# Patient Record
Sex: MALE | Race: WHITE | NOT HISPANIC OR LATINO | Employment: OTHER | URBAN - NONMETROPOLITAN AREA
[De-identification: names, ages, dates, MRNs, and addresses within clinical notes are randomized per-mention and may not be internally consistent; named-entity substitution may affect disease eponyms.]

---

## 2020-12-01 ENCOUNTER — TRANSCRIBE ORDERS (OUTPATIENT)
Dept: NUTRITION | Facility: HOSPITAL | Age: 55
End: 2020-12-01

## 2020-12-01 ENCOUNTER — TRANSCRIBE ORDERS (OUTPATIENT)
Dept: ADMINISTRATIVE | Facility: HOSPITAL | Age: 55
End: 2020-12-01

## 2020-12-01 DIAGNOSIS — K51.90 ULCERATIVE COLITIS, CHRONIC, WITHOUT COMPLICATIONS (HCC): Primary | ICD-10-CM

## 2020-12-11 ENCOUNTER — HOSPITAL ENCOUNTER (OUTPATIENT)
Dept: CT IMAGING | Facility: HOSPITAL | Age: 55
Discharge: HOME | End: 2020-12-11
Admitting: COLON & RECTAL SURGERY

## 2020-12-11 DIAGNOSIS — K51.90 ULCERATIVE COLITIS, CHRONIC, WITHOUT COMPLICATIONS (HCC): ICD-10-CM

## 2020-12-11 PROCEDURE — 25010000002 IOPAMIDOL 61 % SOLUTION: Performed by: COLON & RECTAL SURGERY

## 2020-12-11 PROCEDURE — 74177 CT ABD & PELVIS W/CONTRAST: CPT

## 2020-12-11 RX ADMIN — IOPAMIDOL 85 ML: 612 INJECTION, SOLUTION INTRAVENOUS at 10:49

## 2021-01-18 ENCOUNTER — APPOINTMENT (OUTPATIENT)
Dept: PREADMISSION TESTING | Facility: HOSPITAL | Age: 56
End: 2021-01-18

## 2021-01-18 ENCOUNTER — ANESTHESIA EVENT (OUTPATIENT)
Dept: PERIOP | Facility: HOSPITAL | Age: 56
End: 2021-01-18

## 2021-01-18 VITALS — WEIGHT: 165.57 LBS | BODY MASS INDEX: 24.52 KG/M2 | HEIGHT: 69 IN

## 2021-01-18 LAB
ABO GROUP BLD: NORMAL
ALBUMIN SERPL-MCNC: 4.3 G/DL (ref 3.5–5.2)
ALBUMIN/GLOB SERPL: 1.8 G/DL
ALP SERPL-CCNC: 74 U/L (ref 39–117)
ALT SERPL W P-5'-P-CCNC: 13 U/L (ref 1–41)
ANION GAP SERPL CALCULATED.3IONS-SCNC: 10 MMOL/L (ref 5–15)
AST SERPL-CCNC: 17 U/L (ref 1–40)
BILIRUB SERPL-MCNC: 0.6 MG/DL (ref 0–1.2)
BLD GP AB SCN SERPL QL: NEGATIVE
BUN SERPL-MCNC: 13 MG/DL (ref 6–20)
BUN/CREAT SERPL: 17.1 (ref 7–25)
CALCIUM SPEC-SCNC: 9.2 MG/DL (ref 8.6–10.5)
CHLORIDE SERPL-SCNC: 105 MMOL/L (ref 98–107)
CO2 SERPL-SCNC: 25 MMOL/L (ref 22–29)
CREAT SERPL-MCNC: 0.76 MG/DL (ref 0.76–1.27)
CRP SERPL-MCNC: 0.31 MG/DL (ref 0–0.5)
DEPRECATED RDW RBC AUTO: 42.8 FL (ref 37–54)
ERYTHROCYTE [DISTWIDTH] IN BLOOD BY AUTOMATED COUNT: 11.6 % (ref 12.3–15.4)
FLUAV RNA RESP QL NAA+PROBE: NOT DETECTED
FLUBV RNA RESP QL NAA+PROBE: NOT DETECTED
GFR SERPL CREATININE-BSD FRML MDRD: 106 ML/MIN/1.73
GLOBULIN UR ELPH-MCNC: 2.4 GM/DL
GLUCOSE SERPL-MCNC: 75 MG/DL (ref 65–99)
HBA1C MFR BLD: 5.3 % (ref 4.8–5.6)
HCT VFR BLD AUTO: 42.5 % (ref 37.5–51)
HGB BLD-MCNC: 14.1 G/DL (ref 13–17.7)
MCH RBC QN AUTO: 33.4 PG (ref 26.6–33)
MCHC RBC AUTO-ENTMCNC: 33.2 G/DL (ref 31.5–35.7)
MCV RBC AUTO: 100.7 FL (ref 79–97)
PLATELET # BLD AUTO: 245 10*3/MM3 (ref 140–450)
PMV BLD AUTO: 9.9 FL (ref 6–12)
POTASSIUM SERPL-SCNC: 4.1 MMOL/L (ref 3.5–5.2)
PROT SERPL-MCNC: 6.7 G/DL (ref 6–8.5)
QT INTERVAL: 424 MS
QTC INTERVAL: 430 MS
RBC # BLD AUTO: 4.22 10*6/MM3 (ref 4.14–5.8)
RH BLD: POSITIVE
SARS-COV-2 RNA RESP QL NAA+PROBE: NOT DETECTED
SODIUM SERPL-SCNC: 140 MMOL/L (ref 136–145)
T&S EXPIRATION DATE: NORMAL
WBC # BLD AUTO: 7.31 10*3/MM3 (ref 3.4–10.8)

## 2021-01-18 PROCEDURE — 93005 ELECTROCARDIOGRAM TRACING: CPT

## 2021-01-18 PROCEDURE — C9803 HOPD COVID-19 SPEC COLLECT: HCPCS

## 2021-01-18 PROCEDURE — 83036 HEMOGLOBIN GLYCOSYLATED A1C: CPT

## 2021-01-18 PROCEDURE — 93010 ELECTROCARDIOGRAM REPORT: CPT | Performed by: INTERNAL MEDICINE

## 2021-01-18 PROCEDURE — 86901 BLOOD TYPING SEROLOGIC RH(D): CPT

## 2021-01-18 PROCEDURE — 87636 SARSCOV2 & INF A&B AMP PRB: CPT

## 2021-01-18 PROCEDURE — 86900 BLOOD TYPING SEROLOGIC ABO: CPT

## 2021-01-18 PROCEDURE — 85027 COMPLETE CBC AUTOMATED: CPT

## 2021-01-18 PROCEDURE — 80053 COMPREHEN METABOLIC PANEL: CPT

## 2021-01-18 PROCEDURE — 86850 RBC ANTIBODY SCREEN: CPT

## 2021-01-18 PROCEDURE — 86140 C-REACTIVE PROTEIN: CPT

## 2021-01-18 PROCEDURE — 36415 COLL VENOUS BLD VENIPUNCTURE: CPT

## 2021-01-18 RX ORDER — BUDESONIDE 3 MG/1
9 CAPSULE, COATED PELLETS ORAL EVERY MORNING
COMMUNITY
End: 2021-12-14

## 2021-01-18 RX ORDER — ESCITALOPRAM OXALATE 10 MG/1
10 TABLET ORAL DAILY
COMMUNITY
End: 2021-12-14

## 2021-01-18 RX ORDER — FAMOTIDINE 10 MG/ML
20 INJECTION, SOLUTION INTRAVENOUS ONCE
Status: CANCELLED | OUTPATIENT
Start: 2021-01-18 | End: 2021-01-18

## 2021-01-18 RX ORDER — SIMETHICONE 125 MG
125 TABLET,CHEWABLE ORAL EVERY 6 HOURS PRN
COMMUNITY
End: 2021-12-14

## 2021-01-18 RX ORDER — TAMSULOSIN HYDROCHLORIDE 0.4 MG/1
1 CAPSULE ORAL DAILY
COMMUNITY

## 2021-01-18 RX ORDER — MELATONIN 10 MG
10 CAPSULE ORAL NIGHTLY PRN
COMMUNITY

## 2021-01-18 RX ORDER — MULTIPLE VITAMINS W/ MINERALS TAB 9MG-400MCG
1 TAB ORAL DAILY
COMMUNITY

## 2021-01-18 RX ORDER — SODIUM CHLORIDE 0.9 % (FLUSH) 0.9 %
10 SYRINGE (ML) INJECTION EVERY 12 HOURS SCHEDULED
Status: CANCELLED | OUTPATIENT
Start: 2021-01-18

## 2021-01-18 RX ORDER — SODIUM CHLORIDE 0.9 % (FLUSH) 0.9 %
10 SYRINGE (ML) INJECTION AS NEEDED
Status: CANCELLED | OUTPATIENT
Start: 2021-01-18

## 2021-01-18 RX ORDER — VEDOLIZUMAB 300 MG/5ML
300 INJECTION, POWDER, LYOPHILIZED, FOR SOLUTION INTRAVENOUS
COMMUNITY
End: 2021-01-23 | Stop reason: HOSPADM

## 2021-01-18 NOTE — PAT
Patient to apply Chlorhexadine wipes  to surgical area (as instructed) the night before procedure and the AM of procedure. Wipes provided.  Patient instructed to drink 20 ounces (or until full) of Gatorade and it needs to be completed 1 hour before given arrival time for procedure (NO RED Gatorade)    Patient verbalized understanding.  Blood bank bracelet applied to patient during Pre Admission Testing visit.  Patient instructed not to remove from arm until after procedure and they are discharged from the hospital.  Explained to patient that they may shower and get the bracelet wet, but not to immerse under water for longer periods (bathing, swimming, hand dishwashing, etc).  Patient verbalized understanding.  Juju Bullock [facility] nurse spoke with patient in PAT.  Temp from screening sticker 96.4.  GI Nurse navigator Delores Akins notified and message was left.

## 2021-01-19 ENCOUNTER — ANESTHESIA (OUTPATIENT)
Dept: PERIOP | Facility: HOSPITAL | Age: 56
End: 2021-01-19

## 2021-01-19 ENCOUNTER — HOSPITAL ENCOUNTER (INPATIENT)
Facility: HOSPITAL | Age: 56
LOS: 4 days | Discharge: HOME HEALTH | End: 2021-01-23
Attending: COLON & RECTAL SURGERY | Admitting: COLON & RECTAL SURGERY

## 2021-01-19 DIAGNOSIS — Z87.19 HISTORY OF ULCERATIVE COLITIS: ICD-10-CM

## 2021-01-19 DIAGNOSIS — D62 ACUTE BLOOD LOSS ANEMIA: ICD-10-CM

## 2021-01-19 DIAGNOSIS — K51.90: ICD-10-CM

## 2021-01-19 DIAGNOSIS — Z90.49 S/P PROCTOCOLECTOMY: Primary | ICD-10-CM

## 2021-01-19 DIAGNOSIS — E87.5 HYPERKALEMIA: ICD-10-CM

## 2021-01-19 LAB
ABO GROUP BLD: NORMAL
RH BLD: POSITIVE

## 2021-01-19 PROCEDURE — 86901 BLOOD TYPING SEROLOGIC RH(D): CPT

## 2021-01-19 PROCEDURE — 25010000002 HYDROMORPHONE PER 4 MG: Performed by: COLON & RECTAL SURGERY

## 2021-01-19 PROCEDURE — 25010000002 ONDANSETRON PER 1 MG: Performed by: NURSE ANESTHETIST, CERTIFIED REGISTERED

## 2021-01-19 PROCEDURE — 25010000002 HEPARIN (PORCINE) PER 1000 UNITS: Performed by: COLON & RECTAL SURGERY

## 2021-01-19 PROCEDURE — 25010000002 BUPRENORPHINE PER 0.1 MG: Performed by: NURSE ANESTHETIST, CERTIFIED REGISTERED

## 2021-01-19 PROCEDURE — 25010000002 NEOSTIGMINE 10 MG/10ML SOLUTION: Performed by: NURSE ANESTHETIST, CERTIFIED REGISTERED

## 2021-01-19 PROCEDURE — 25010000002 DEXAMETHASONE PER 1 MG: Performed by: NURSE ANESTHETIST, CERTIFIED REGISTERED

## 2021-01-19 PROCEDURE — 25010000002 KETOROLAC TROMETHAMINE PER 15 MG: Performed by: COLON & RECTAL SURGERY

## 2021-01-19 PROCEDURE — 25010000002 HYDROMORPHONE PER 4 MG: Performed by: NURSE ANESTHETIST, CERTIFIED REGISTERED

## 2021-01-19 PROCEDURE — 86900 BLOOD TYPING SEROLOGIC ABO: CPT

## 2021-01-19 PROCEDURE — 88305 TISSUE EXAM BY PATHOLOGIST: CPT | Performed by: COLON & RECTAL SURGERY

## 2021-01-19 PROCEDURE — 0D1B0Z4 BYPASS ILEUM TO CUTANEOUS, OPEN APPROACH: ICD-10-PCS | Performed by: COLON & RECTAL SURGERY

## 2021-01-19 PROCEDURE — 25010000002 DEXAMETHASONE SODIUM PHOSPHATE 10 MG/ML SOLUTION: Performed by: NURSE ANESTHETIST, CERTIFIED REGISTERED

## 2021-01-19 PROCEDURE — 25010000002 ERTAPENEM 1 GM/100ML SOLUTION: Performed by: COLON & RECTAL SURGERY

## 2021-01-19 PROCEDURE — 88309 TISSUE EXAM BY PATHOLOGIST: CPT | Performed by: COLON & RECTAL SURGERY

## 2021-01-19 PROCEDURE — 25010000002 PROPOFOL 10 MG/ML EMULSION: Performed by: NURSE ANESTHETIST, CERTIFIED REGISTERED

## 2021-01-19 PROCEDURE — 0DJD8ZZ INSPECTION OF LOWER INTESTINAL TRACT, VIA NATURAL OR ARTIFICIAL OPENING ENDOSCOPIC: ICD-10-PCS | Performed by: COLON & RECTAL SURGERY

## 2021-01-19 PROCEDURE — C1769 GUIDE WIRE: HCPCS | Performed by: COLON & RECTAL SURGERY

## 2021-01-19 PROCEDURE — C1765 ADHESION BARRIER: HCPCS | Performed by: COLON & RECTAL SURGERY

## 2021-01-19 PROCEDURE — 0DTP0ZZ RESECTION OF RECTUM, OPEN APPROACH: ICD-10-PCS | Performed by: COLON & RECTAL SURGERY

## 2021-01-19 PROCEDURE — 07TP0ZZ RESECTION OF SPLEEN, OPEN APPROACH: ICD-10-PCS | Performed by: COLON & RECTAL SURGERY

## 2021-01-19 PROCEDURE — 0DBE0ZZ EXCISION OF LARGE INTESTINE, OPEN APPROACH: ICD-10-PCS | Performed by: COLON & RECTAL SURGERY

## 2021-01-19 PROCEDURE — 25810000003 SODIUM CHLORIDE 0.9 % WITH KCL 20 MEQ 20-0.9 MEQ/L-% SOLUTION: Performed by: COLON & RECTAL SURGERY

## 2021-01-19 PROCEDURE — 25010000002 FENTANYL CITRATE (PF) 100 MCG/2ML SOLUTION: Performed by: NURSE ANESTHETIST, CERTIFIED REGISTERED

## 2021-01-19 DEVICE — CONTOUR CURVED CUTTER STAPLER
Type: IMPLANTABLE DEVICE | Site: ABDOMEN | Status: FUNCTIONAL
Brand: CONTOUR

## 2021-01-19 DEVICE — PROXIMATE LINEAR CUTTER RELOAD, BLUE, 75MM
Type: IMPLANTABLE DEVICE | Site: ABDOMEN | Status: FUNCTIONAL
Brand: PROXIMATE

## 2021-01-19 DEVICE — CELLULAR STAPLER WITH TRI-STAPLE TECHNOLOGY
Type: IMPLANTABLE DEVICE | Site: ABDOMEN | Status: FUNCTIONAL
Brand: EEA

## 2021-01-19 DEVICE — PROXIMATE RELOADABLE LINEAR CUTTER WITH SAFETY LOCK-OUT, 75MM
Type: IMPLANTABLE DEVICE | Site: ABDOMEN | Status: FUNCTIONAL
Brand: PROXIMATE

## 2021-01-19 RX ORDER — FENTANYL CITRATE 50 UG/ML
50 INJECTION, SOLUTION INTRAMUSCULAR; INTRAVENOUS
Status: COMPLETED | OUTPATIENT
Start: 2021-01-19 | End: 2021-01-19

## 2021-01-19 RX ORDER — SODIUM CHLORIDE, SODIUM LACTATE, POTASSIUM CHLORIDE, CALCIUM CHLORIDE 600; 310; 30; 20 MG/100ML; MG/100ML; MG/100ML; MG/100ML
9 INJECTION, SOLUTION INTRAVENOUS CONTINUOUS
Status: DISCONTINUED | OUTPATIENT
Start: 2021-01-19 | End: 2021-01-23 | Stop reason: HOSPADM

## 2021-01-19 RX ORDER — ROCURONIUM BROMIDE 10 MG/ML
INJECTION, SOLUTION INTRAVENOUS AS NEEDED
Status: DISCONTINUED | OUTPATIENT
Start: 2021-01-19 | End: 2021-01-19 | Stop reason: SURG

## 2021-01-19 RX ORDER — BUPIVACAINE HYDROCHLORIDE 2.5 MG/ML
INJECTION, SOLUTION EPIDURAL; INFILTRATION; INTRACAUDAL
Status: COMPLETED | OUTPATIENT
Start: 2021-01-19 | End: 2021-01-19

## 2021-01-19 RX ORDER — FAMOTIDINE 10 MG/ML
20 INJECTION, SOLUTION INTRAVENOUS 2 TIMES DAILY
Status: DISCONTINUED | OUTPATIENT
Start: 2021-01-19 | End: 2021-01-21 | Stop reason: CLARIF

## 2021-01-19 RX ORDER — CHOLECALCIFEROL (VITAMIN D3) 125 MCG
5 CAPSULE ORAL NIGHTLY
Status: DISCONTINUED | OUTPATIENT
Start: 2021-01-19 | End: 2021-01-23 | Stop reason: HOSPADM

## 2021-01-19 RX ORDER — FERRIC SUBSULFATE 0.21 G/G
LIQUID TOPICAL AS NEEDED
Status: DISCONTINUED | OUTPATIENT
Start: 2021-01-19 | End: 2021-01-23 | Stop reason: HOSPADM

## 2021-01-19 RX ORDER — LIDOCAINE HYDROCHLORIDE 10 MG/ML
INJECTION, SOLUTION EPIDURAL; INFILTRATION; INTRACAUDAL; PERINEURAL AS NEEDED
Status: DISCONTINUED | OUTPATIENT
Start: 2021-01-19 | End: 2021-01-19 | Stop reason: SURG

## 2021-01-19 RX ORDER — EPHEDRINE SULFATE 50 MG/ML
5 INJECTION, SOLUTION INTRAVENOUS ONCE AS NEEDED
Status: DISCONTINUED | OUTPATIENT
Start: 2021-01-19 | End: 2021-01-19 | Stop reason: HOSPADM

## 2021-01-19 RX ORDER — MELOXICAM 15 MG/1
15 TABLET ORAL ONCE
Status: COMPLETED | OUTPATIENT
Start: 2021-01-19 | End: 2021-01-19

## 2021-01-19 RX ORDER — ONDANSETRON 2 MG/ML
INJECTION INTRAMUSCULAR; INTRAVENOUS AS NEEDED
Status: DISCONTINUED | OUTPATIENT
Start: 2021-01-19 | End: 2021-01-19 | Stop reason: SURG

## 2021-01-19 RX ORDER — DEXAMETHASONE SODIUM PHOSPHATE 4 MG/ML
INJECTION, SOLUTION INTRA-ARTICULAR; INTRALESIONAL; INTRAMUSCULAR; INTRAVENOUS; SOFT TISSUE AS NEEDED
Status: DISCONTINUED | OUTPATIENT
Start: 2021-01-19 | End: 2021-01-19 | Stop reason: SURG

## 2021-01-19 RX ORDER — ONDANSETRON 4 MG/1
4 TABLET, FILM COATED ORAL EVERY 6 HOURS PRN
Status: DISCONTINUED | OUTPATIENT
Start: 2021-01-19 | End: 2021-01-23 | Stop reason: HOSPADM

## 2021-01-19 RX ORDER — HYDROMORPHONE HYDROCHLORIDE 1 MG/ML
0.5 INJECTION, SOLUTION INTRAMUSCULAR; INTRAVENOUS; SUBCUTANEOUS
Status: DISCONTINUED | OUTPATIENT
Start: 2021-01-19 | End: 2021-01-23 | Stop reason: HOSPADM

## 2021-01-19 RX ORDER — POLYVINYL ALCOHOL 14 MG/ML
2 SOLUTION/ DROPS OPHTHALMIC
Status: DISCONTINUED | OUTPATIENT
Start: 2021-01-19 | End: 2021-01-23 | Stop reason: HOSPADM

## 2021-01-19 RX ORDER — DEXAMETHASONE SODIUM PHOSPHATE 1 MG/ML
1 SOLUTION/ DROPS OPHTHALMIC EVERY 6 HOURS SCHEDULED
Status: DISCONTINUED | OUTPATIENT
Start: 2021-01-19 | End: 2021-01-23 | Stop reason: HOSPADM

## 2021-01-19 RX ORDER — PREGABALIN 75 MG/1
75 CAPSULE ORAL ONCE
Status: COMPLETED | OUTPATIENT
Start: 2021-01-19 | End: 2021-01-19

## 2021-01-19 RX ORDER — KETOROLAC TROMETHAMINE 15 MG/ML
15 INJECTION, SOLUTION INTRAMUSCULAR; INTRAVENOUS EVERY 6 HOURS
Status: COMPLETED | OUTPATIENT
Start: 2021-01-19 | End: 2021-01-21

## 2021-01-19 RX ORDER — TAMSULOSIN HYDROCHLORIDE 0.4 MG/1
0.4 CAPSULE ORAL 2 TIMES DAILY
Status: DISCONTINUED | OUTPATIENT
Start: 2021-01-19 | End: 2021-01-23 | Stop reason: HOSPADM

## 2021-01-19 RX ORDER — DEXAMETHASONE SODIUM PHOSPHATE 10 MG/ML
INJECTION, SOLUTION INTRAMUSCULAR; INTRAVENOUS
Status: COMPLETED | OUTPATIENT
Start: 2021-01-19 | End: 2021-01-19

## 2021-01-19 RX ORDER — ONDANSETRON 2 MG/ML
4 INJECTION INTRAMUSCULAR; INTRAVENOUS EVERY 6 HOURS PRN
Status: DISCONTINUED | OUTPATIENT
Start: 2021-01-19 | End: 2021-01-19 | Stop reason: SDUPTHER

## 2021-01-19 RX ORDER — GABAPENTIN 300 MG/1
300 CAPSULE ORAL 3 TIMES DAILY
Status: COMPLETED | OUTPATIENT
Start: 2021-01-19 | End: 2021-01-22

## 2021-01-19 RX ORDER — FAMOTIDINE 20 MG/1
20 TABLET, FILM COATED ORAL ONCE
Status: COMPLETED | OUTPATIENT
Start: 2021-01-19 | End: 2021-01-19

## 2021-01-19 RX ORDER — ACETAMINOPHEN 500 MG
1000 TABLET ORAL ONCE
Status: COMPLETED | OUTPATIENT
Start: 2021-01-19 | End: 2021-01-19

## 2021-01-19 RX ORDER — DIAZEPAM 5 MG/1
5 TABLET ORAL EVERY 6 HOURS PRN
Status: DISCONTINUED | OUTPATIENT
Start: 2021-01-19 | End: 2021-01-23 | Stop reason: HOSPADM

## 2021-01-19 RX ORDER — ALVIMOPAN 12 MG/1
12 CAPSULE ORAL ONCE
Status: COMPLETED | OUTPATIENT
Start: 2021-01-19 | End: 2021-01-19

## 2021-01-19 RX ORDER — ONDANSETRON 2 MG/ML
4 INJECTION INTRAMUSCULAR; INTRAVENOUS ONCE AS NEEDED
Status: DISCONTINUED | OUTPATIENT
Start: 2021-01-19 | End: 2021-01-19 | Stop reason: HOSPADM

## 2021-01-19 RX ORDER — ACETAMINOPHEN 325 MG/1
650 TABLET ORAL EVERY 8 HOURS
Status: DISCONTINUED | OUTPATIENT
Start: 2021-01-19 | End: 2021-01-23 | Stop reason: HOSPADM

## 2021-01-19 RX ORDER — LIDOCAINE HYDROCHLORIDE 10 MG/ML
0.5 INJECTION, SOLUTION EPIDURAL; INFILTRATION; INTRACAUDAL; PERINEURAL ONCE AS NEEDED
Status: COMPLETED | OUTPATIENT
Start: 2021-01-19 | End: 2021-01-19

## 2021-01-19 RX ORDER — PROPOFOL 10 MG/ML
VIAL (ML) INTRAVENOUS AS NEEDED
Status: DISCONTINUED | OUTPATIENT
Start: 2021-01-19 | End: 2021-01-19 | Stop reason: SURG

## 2021-01-19 RX ORDER — HYDROCODONE BITARTRATE AND ACETAMINOPHEN 7.5; 325 MG/1; MG/1
1 TABLET ORAL EVERY 4 HOURS PRN
Status: DISCONTINUED | OUTPATIENT
Start: 2021-01-19 | End: 2021-01-23 | Stop reason: HOSPADM

## 2021-01-19 RX ORDER — FENTANYL CITRATE 50 UG/ML
INJECTION, SOLUTION INTRAMUSCULAR; INTRAVENOUS AS NEEDED
Status: DISCONTINUED | OUTPATIENT
Start: 2021-01-19 | End: 2021-01-19 | Stop reason: SURG

## 2021-01-19 RX ORDER — ONDANSETRON 2 MG/ML
4 INJECTION INTRAMUSCULAR; INTRAVENOUS EVERY 6 HOURS PRN
Status: DISCONTINUED | OUTPATIENT
Start: 2021-01-19 | End: 2021-01-23 | Stop reason: HOSPADM

## 2021-01-19 RX ORDER — SODIUM CHLORIDE AND POTASSIUM CHLORIDE 150; 900 MG/100ML; MG/100ML
100 INJECTION, SOLUTION INTRAVENOUS CONTINUOUS
Status: DISCONTINUED | OUTPATIENT
Start: 2021-01-19 | End: 2021-01-20 | Stop reason: ALTCHOICE

## 2021-01-19 RX ORDER — HEPARIN SODIUM 5000 [USP'U]/ML
5000 INJECTION, SOLUTION INTRAVENOUS; SUBCUTANEOUS ONCE
Status: COMPLETED | OUTPATIENT
Start: 2021-01-19 | End: 2021-01-19

## 2021-01-19 RX ORDER — HEPARIN SODIUM 5000 [USP'U]/ML
5000 INJECTION, SOLUTION INTRAVENOUS; SUBCUTANEOUS EVERY 8 HOURS SCHEDULED
Status: DISCONTINUED | OUTPATIENT
Start: 2021-01-20 | End: 2021-01-20

## 2021-01-19 RX ORDER — NALOXONE HCL 0.4 MG/ML
0.4 VIAL (ML) INJECTION AS NEEDED
Status: DISCONTINUED | OUTPATIENT
Start: 2021-01-19 | End: 2021-01-19 | Stop reason: HOSPADM

## 2021-01-19 RX ORDER — HYDROMORPHONE HYDROCHLORIDE 1 MG/ML
0.5 INJECTION, SOLUTION INTRAMUSCULAR; INTRAVENOUS; SUBCUTANEOUS
Status: DISCONTINUED | OUTPATIENT
Start: 2021-01-19 | End: 2021-01-19 | Stop reason: HOSPADM

## 2021-01-19 RX ORDER — NEOSTIGMINE METHYLSULFATE 1 MG/ML
INJECTION, SOLUTION INTRAVENOUS AS NEEDED
Status: DISCONTINUED | OUTPATIENT
Start: 2021-01-19 | End: 2021-01-19 | Stop reason: SURG

## 2021-01-19 RX ORDER — ESCITALOPRAM OXALATE 10 MG/1
10 TABLET ORAL DAILY
Status: DISCONTINUED | OUTPATIENT
Start: 2021-01-19 | End: 2021-01-23 | Stop reason: HOSPADM

## 2021-01-19 RX ORDER — GLYCOPYRROLATE 0.2 MG/ML
INJECTION INTRAMUSCULAR; INTRAVENOUS AS NEEDED
Status: DISCONTINUED | OUTPATIENT
Start: 2021-01-19 | End: 2021-01-19 | Stop reason: SURG

## 2021-01-19 RX ORDER — NALOXONE HCL 0.4 MG/ML
0.4 VIAL (ML) INJECTION
Status: DISCONTINUED | OUTPATIENT
Start: 2021-01-19 | End: 2021-01-23 | Stop reason: HOSPADM

## 2021-01-19 RX ORDER — BUPRENORPHINE HYDROCHLORIDE 0.32 MG/ML
INJECTION INTRAMUSCULAR; INTRAVENOUS
Status: COMPLETED | OUTPATIENT
Start: 2021-01-19 | End: 2021-01-19

## 2021-01-19 RX ORDER — LABETALOL HYDROCHLORIDE 5 MG/ML
10 INJECTION, SOLUTION INTRAVENOUS EVERY 4 HOURS PRN
Status: ACTIVE | OUTPATIENT
Start: 2021-01-19 | End: 2021-01-22

## 2021-01-19 RX ORDER — SCOLOPAMINE TRANSDERMAL SYSTEM 1 MG/1
1 PATCH, EXTENDED RELEASE TRANSDERMAL CONTINUOUS
Status: ACTIVE | OUTPATIENT
Start: 2021-01-19 | End: 2021-01-22

## 2021-01-19 RX ORDER — SODIUM CHLORIDE, SODIUM LACTATE, POTASSIUM CHLORIDE, CALCIUM CHLORIDE 600; 310; 30; 20 MG/100ML; MG/100ML; MG/100ML; MG/100ML
100 INJECTION, SOLUTION INTRAVENOUS CONTINUOUS
Status: DISCONTINUED | OUTPATIENT
Start: 2021-01-19 | End: 2021-01-23 | Stop reason: HOSPADM

## 2021-01-19 RX ORDER — MAGNESIUM HYDROXIDE 1200 MG/15ML
LIQUID ORAL AS NEEDED
Status: DISCONTINUED | OUTPATIENT
Start: 2021-01-19 | End: 2021-01-19 | Stop reason: HOSPADM

## 2021-01-19 RX ORDER — EPHEDRINE SULFATE 50 MG/ML
INJECTION, SOLUTION INTRAVENOUS AS NEEDED
Status: DISCONTINUED | OUTPATIENT
Start: 2021-01-19 | End: 2021-01-19 | Stop reason: SURG

## 2021-01-19 RX ADMIN — MELOXICAM 15 MG: 15 TABLET ORAL at 09:04

## 2021-01-19 RX ADMIN — LIDOCAINE HYDROCHLORIDE 50 MG: 10 INJECTION, SOLUTION EPIDURAL; INFILTRATION; INTRACAUDAL; PERINEURAL at 11:50

## 2021-01-19 RX ADMIN — HEPARIN SODIUM 5000 UNITS: 5000 INJECTION, SOLUTION INTRAVENOUS; SUBCUTANEOUS at 09:05

## 2021-01-19 RX ADMIN — HYDROMORPHONE HYDROCHLORIDE 0.5 MG: 1 INJECTION, SOLUTION INTRAMUSCULAR; INTRAVENOUS; SUBCUTANEOUS at 15:24

## 2021-01-19 RX ADMIN — LIDOCAINE HYDROCHLORIDE 0.5 ML: 10 INJECTION, SOLUTION EPIDURAL; INFILTRATION; INTRACAUDAL; PERINEURAL at 08:55

## 2021-01-19 RX ADMIN — TAMSULOSIN HYDROCHLORIDE 0.4 MG: 0.4 CAPSULE ORAL at 20:23

## 2021-01-19 RX ADMIN — ALVIMOPAN 12 MG: 12 CAPSULE ORAL at 09:04

## 2021-01-19 RX ADMIN — ROCURONIUM BROMIDE 20 MG: 10 INJECTION INTRAVENOUS at 12:51

## 2021-01-19 RX ADMIN — SCOPALAMINE 1 PATCH: 1 PATCH, EXTENDED RELEASE TRANSDERMAL at 09:04

## 2021-01-19 RX ADMIN — FENTANYL CITRATE 50 MCG: 50 INJECTION, SOLUTION INTRAMUSCULAR; INTRAVENOUS at 15:15

## 2021-01-19 RX ADMIN — PROPOFOL 25 MCG/KG/MIN: 10 INJECTION, EMULSION INTRAVENOUS at 11:56

## 2021-01-19 RX ADMIN — BUPIVACAINE HYDROCHLORIDE 60 ML: 2.5 INJECTION, SOLUTION EPIDURAL; INFILTRATION; INTRACAUDAL; PERINEURAL at 11:53

## 2021-01-19 RX ADMIN — POTASSIUM CHLORIDE AND SODIUM CHLORIDE 100 ML/HR: 900; 150 INJECTION, SOLUTION INTRAVENOUS at 18:10

## 2021-01-19 RX ADMIN — FENTANYL CITRATE 50 MCG: 50 INJECTION, SOLUTION INTRAMUSCULAR; INTRAVENOUS at 11:50

## 2021-01-19 RX ADMIN — SODIUM CHLORIDE, POTASSIUM CHLORIDE, SODIUM LACTATE AND CALCIUM CHLORIDE 9 ML/HR: 600; 310; 30; 20 INJECTION, SOLUTION INTRAVENOUS at 08:55

## 2021-01-19 RX ADMIN — HYDROMORPHONE HYDROCHLORIDE 0.5 MG: 1 INJECTION, SOLUTION INTRAMUSCULAR; INTRAVENOUS; SUBCUTANEOUS at 15:29

## 2021-01-19 RX ADMIN — GABAPENTIN 300 MG: 300 CAPSULE ORAL at 20:22

## 2021-01-19 RX ADMIN — ROCURONIUM BROMIDE 50 MG: 10 INJECTION INTRAVENOUS at 11:50

## 2021-01-19 RX ADMIN — ERTAPENEM SODIUM 1 G: 1 INJECTION, POWDER, LYOPHILIZED, FOR SOLUTION INTRAMUSCULAR; INTRAVENOUS at 11:55

## 2021-01-19 RX ADMIN — DEXAMETHASONE SODIUM PHOSPHATE 1 DROP: 1 SOLUTION/ DROPS OPHTHALMIC at 20:23

## 2021-01-19 RX ADMIN — FENTANYL CITRATE 50 MCG: 50 INJECTION, SOLUTION INTRAMUSCULAR; INTRAVENOUS at 14:07

## 2021-01-19 RX ADMIN — DEXAMETHASONE SODIUM PHOSPHATE 6 MG: 4 INJECTION, SOLUTION INTRA-ARTICULAR; INTRALESIONAL; INTRAMUSCULAR; INTRAVENOUS; SOFT TISSUE at 11:51

## 2021-01-19 RX ADMIN — DEXAMETHASONE SODIUM PHOSPHATE 4 MG: 10 INJECTION, SOLUTION INTRAMUSCULAR; INTRAVENOUS at 11:53

## 2021-01-19 RX ADMIN — GLYCOPYRROLATE 0.2 MG: 0.4 INJECTION INTRAMUSCULAR; INTRAVENOUS at 12:25

## 2021-01-19 RX ADMIN — BUPRENORPHINE HYDROCHLORIDE 0.3 MG: 0.32 INJECTION INTRAMUSCULAR; INTRAVENOUS at 11:53

## 2021-01-19 RX ADMIN — PREGABALIN 75 MG: 75 CAPSULE ORAL at 09:04

## 2021-01-19 RX ADMIN — FAMOTIDINE 20 MG: 10 INJECTION, SOLUTION INTRAVENOUS at 20:22

## 2021-01-19 RX ADMIN — Medication 5 MG: at 20:22

## 2021-01-19 RX ADMIN — SODIUM CHLORIDE, POTASSIUM CHLORIDE, SODIUM LACTATE AND CALCIUM CHLORIDE: 600; 310; 30; 20 INJECTION, SOLUTION INTRAVENOUS at 14:00

## 2021-01-19 RX ADMIN — FENTANYL CITRATE 50 MCG: 50 INJECTION, SOLUTION INTRAMUSCULAR; INTRAVENOUS at 15:05

## 2021-01-19 RX ADMIN — HYDROMORPHONE HYDROCHLORIDE 0.5 MG: 1 INJECTION, SOLUTION INTRAMUSCULAR; INTRAVENOUS; SUBCUTANEOUS at 20:26

## 2021-01-19 RX ADMIN — ONDANSETRON 4 MG: 2 INJECTION INTRAMUSCULAR; INTRAVENOUS at 14:05

## 2021-01-19 RX ADMIN — EPHEDRINE SULFATE 10 MG: 50 INJECTION, SOLUTION INTRAVENOUS at 12:31

## 2021-01-19 RX ADMIN — FAMOTIDINE 20 MG: 20 TABLET, FILM COATED ORAL at 09:04

## 2021-01-19 RX ADMIN — KETOROLAC TROMETHAMINE 15 MG: 15 INJECTION, SOLUTION INTRAMUSCULAR; INTRAVENOUS at 18:10

## 2021-01-19 RX ADMIN — FENTANYL CITRATE 50 MCG: 50 INJECTION, SOLUTION INTRAMUSCULAR; INTRAVENOUS at 16:37

## 2021-01-19 RX ADMIN — FENTANYL CITRATE 50 MCG: 50 INJECTION, SOLUTION INTRAMUSCULAR; INTRAVENOUS at 16:27

## 2021-01-19 RX ADMIN — ACETAMINOPHEN 1000 MG: 500 TABLET ORAL at 09:04

## 2021-01-19 RX ADMIN — PROPOFOL 150 MG: 10 INJECTION, EMULSION INTRAVENOUS at 11:50

## 2021-01-19 RX ADMIN — ACETAMINOPHEN 650 MG: 325 TABLET, FILM COATED ORAL at 18:10

## 2021-01-19 RX ADMIN — NEOSTIGMINE 3 MG: 1 INJECTION INTRAVENOUS at 14:23

## 2021-01-19 RX ADMIN — ESCITALOPRAM OXALATE 10 MG: 10 TABLET ORAL at 20:22

## 2021-01-19 RX ADMIN — GLYCOPYRROLATE 0.4 MG: 0.4 INJECTION INTRAMUSCULAR; INTRAVENOUS at 14:23

## 2021-01-19 NOTE — ANESTHESIA PROCEDURE NOTES
Peripheral Block      Patient reassessed immediately prior to procedure    Patient location during procedure: OR  Reason for block: at surgeon's request and post-op pain management  Performed by  CRNA: Arnaldo Garrett CRNA  Assisted by: Rishabh Galo CRNA  Preanesthetic Checklist  Completed: patient identified, site marked, surgical consent, pre-op evaluation, timeout performed, IV checked, risks and benefits discussed and monitors and equipment checked  Prep:  Pt Position: supine  Sterile barriers:cap, gloves, sterile barriers and mask  Prep: ChloraPrep  Patient monitoring: blood pressure monitoring, continuous pulse oximetry and EKG  Procedure  Sedation:yes  Performed under: general  Guidance:ultrasound guided  Images:still images obtained, printed/placed on chart    Laterality:Bilateral (4 quad)  Block Type:TAP  Injection Technique:single-shot  Needle Type:short-bevel and echogenic  Needle Gauge:20 G  Resistance on Injection: none    Medications Used: buprenorphine (BUPRENEX) injection, 0.3 mg  dexamethasone sodium phosphate injection, 4 mg  bupivacaine PF (MARCAINE) 0.25 % injection, 60 mL  Med admintered at 1/19/2021 11:53 AM      Medications  Preservative Free Saline:20ml  Comment:Block Injection:  LA dose divided between Right and Left block        Post Assessment  Injection Assessment: negative aspiration for heme, incremental injection and no paresthesia on injection  Patient Tolerance:comfortable throughout block  Complications:no  Additional Notes      Under Ultrasound guidance, a BBraun 4inch 360 degree needle was advanced with Normal Saline hydro dissection of tissue.  The Internal Oblique and Transversus Abdominus muscles where visualized.  At or before the aponeurosis of Internal Oblique, local anesthetic spread was visualized in the Transversus Abdominus Plane. Injection was made incrementally with aspiration every 5 mls.  There was no  intravascular injection,  injection pressure was normal, there  was no neural injection, and the procedure was completed without difficulty.  Thank You.

## 2021-01-19 NOTE — ANESTHESIA PREPROCEDURE EVALUATION
Anesthesia Evaluation     Patient summary reviewed and Nursing notes reviewed                Airway   Mallampati: II  TM distance: >3 FB  Neck ROM: full  No difficulty expected  Dental - normal exam   (+) partials and poor dentition    Pulmonary - normal exam   (+) a smoker Former,   Cardiovascular - negative cardio ROS and normal exam        Neuro/Psych- negative ROS  GI/Hepatic/Renal/Endo - negative ROS     ROS Comment: Ulcerative colitis    Musculoskeletal (-) negative ROS    Abdominal  - normal exam    Bowel sounds: normal.   Substance History - negative use     OB/GYN negative ob/gyn ROS         Other                      Anesthesia Plan    ASA 2     general with block   (TAP blocks)  intravenous induction     Anesthetic plan, all risks, benefits, and alternatives have been provided, discussed and informed consent has been obtained with: patient.    Plan discussed with CRNA.

## 2021-01-19 NOTE — OP NOTE
[facility] ([facility])    Proctocolectomy, total mesorectal excision  Ileal pouch, 18 cm  Pouch anal anastomosis at 1 cm above the dentate line/pelvic floor.  Protecting loop ileostomy 20 cm proximal to the ileal pouch.  Resection of accessory spleen    Procedure Note    Craig Smalls  1/19/2021    Pre-op Diagnosis: History of ulcerative colitis  History of dysplasia  History of biologic therapy/Entyvio  IBD/diagnosis 7 years ago  Preop evaluation including CT demonstrating thickening of the colon.    Post-op Diagnosis: Same, no intraoperative findings to suggest Crohn's disease, healthy appearing small bowel, healthy appearing soft tissues around the anal canal.    Anesthesia: General with Block    Staff:   Circulator: Ermelinda Gordon RN; Lily Callahan RN; Germaine Cabrera RN  Scrub Person: Loki Gutierrez; Dar Malone  Assistant: Peggy Pool RNFA    Assistant: Peggy Pool RNFA was responsible for performing the following activities: Exposure, retraction, irrigation, approximation of tissues and their skilled assistance was necessary for the success of this case.    Estimated Blood Loss: 200 mL    Specimens: Abdominal colon and rectum, anastomotic tissue, accessory spleen        Drains: Pelvic JANNET drain    Findings: As above    Complications: None evident    The procedure was reviewed in the pretreatment area.  We discussed the surgical options once again with plan for proctocolectomy and pouch if this appears reasonable, permanent ileostomy if not.    With antibiotic and DVT prophylaxis, the patient advanced to the operating room where general anesthesia was achieved, block was performed by anesthesia.    Position was modified lithotomy.  The anal canal and the perineum/abdominal regions were prepped and draped.    Timeout protocol was utilized.    Exploration through a lower abdominal midline incision was without evidence of pathology in the small bowel which  was run from the ligament of Treitz to the distal ileum.    The hepatobiliary structures appeared healthy.    The stomach was nicely decompressed with orogastric tube.    The colon appeared diffusely inflamed congruent with diagnosis of chronic ulcerative colitis.  There is no palpable mass throughout.    The sigmoid was mobilized and the ileocolic region was mobilized with bilateral identification of ureters.    The left colon was mobilized away from the kidney.    In reverse Trendelenburg the splenic flexure was completely mobilized with care to avoid the duodenum.  The dissection proceeded inferior to the spleen.  There is accessory spleen which was resected and passed off as a specimen.    The mobilization proceeded beyond the pancreas.    The hepatic flexure was completely mobilized to the duodenum posterior lateral to the plane of dissection.    The distal ileum was divided with JEFF.  The mesentery was sequentially divided preserving the ileocolic artery and suture ligating the right colic artery.    As the mesentery was divided moving distally, the middle colic artery was suture-ligated.    The left colic artery was suture-ligated, the dissection proceeded down to the sigmoid region where there was ligation of the inferior mesenteric artery, not high.    With care to preserve the hypogastric nerves and surrounding soft tissues the dissection continued in the presacral plane, mesorectal excision was performed as per routine dissecting posteriorly, posterior laterally, laterally, anterior laterally, and finally anteriorly dissecting the anterior peritoneal reflection, dissecting the plane between the prostate and rectum, down to the pelvic floor circumferentially.    The contour device was used to divide the rectum at the pelvic floor, just proximal to the puborectalis.    There is good hemostasis on inspection.    A interval lap count was satisfactory at this point in the case.    There was good reach of the  ileum down to the deep pelvis after mobilization of the mesentery up to the SMA/duodenum origin region.    The apex of the pouch was selected anticipating a pouch length of about 18 cm.  This was incised in the antimesenteric corner, sequential applications of the 75 blue thickness JEFF were used to create the pouch reservoir.    Irrigation of the pouch demonstrated good volume content, no gross blood, pouch appeared very satisfactory.    The excess tissue at the apex of the pouch was excised, the anvil, 28 EEA was secured at the top of the pouch with a handsewn pursestring.    The EEA was gently introduced into the anal canal, the post was open, the anvil was connected.  This was closed and anastomosis was complete.    Proctoscopy demonstrated anastomosis at the top of the internal hemorrhoids just above the puborectalis which was airtight, hemostatic, and widely patent.    Irrigation aspiration was performed throughout.  Each quadrant was inspected for hemostasis which was satisfactory.    25 cm upstream of the pouch the small bowel location was selected for protecting loop ileostomy.  Umbilical tape was placed around the bowel and this was oriented with dyed and undyed Vicryl and brought out across the rectus at the preoperatively identified stoma site which had been marked by the stomal therapist.    10 flat JANNET was used in the deep pelvis.    2 sheets of Seprafilm were used around the loop ileostomy.    The residual omentum was draped of the small bowel.    The midline fascia was reapproximated into retention of oh Vicryl No. 1 loop PDS    The incision was irrigated with dilute antiseptic solution    The skin was reapproximated skin clips.    The final counts were announced as correct.    The stoma was matured with flap maturation of the distal inferior aspect and Deepali eversion of the proximal superior aspect.    The final counts were announced as correct.      Silverio Montero MD     Date: 1/19/2021  Time:  14:35 EST

## 2021-01-19 NOTE — ANESTHESIA PROCEDURE NOTES
Airway  Urgency: elective    Date/Time: 1/19/2021 11:51 AM  Airway not difficult    General Information and Staff    Patient location during procedure: OR  CRNA: Rishabh Galo CRNA    Indications and Patient Condition  Indications for airway management: airway protection    Preoxygenated: yes  MILS not maintained throughout  Mask difficulty assessment: 1 - vent by mask    Final Airway Details  Final airway type: endotracheal airway      Successful airway: ETT  Cuffed: yes   Successful intubation technique: direct laryngoscopy  Endotracheal tube insertion site: oral  Blade: Maldonado  Blade size: 2  ETT size (mm): 7.5  Cormack-Lehane Classification: grade I - full view of glottis  Placement verified by: chest auscultation and capnometry   Cuff volume (mL): 5  Measured from: lips  ETT/EBT  to lips (cm): 22  Number of attempts at approach: 1  Assessment: lips, teeth, and gum same as pre-op and atraumatic intubation    Additional Comments  Negative epigastric sounds, Breath sound equal bilaterally with symmetric chest rise and fall

## 2021-01-19 NOTE — NURSING NOTE
wocn performed stoma site marking in rlq. Patient assessed lying sitting and standing. Rectus muscle identified and line of vision.     Patient lots of questions. Most answered. reassurance given on follow up while inpatient for resources and education.     wocn will continue to follow. Please contact with any questions or concerns.

## 2021-01-19 NOTE — ANESTHESIA POSTPROCEDURE EVALUATION
Patient: Craig Smalls    Procedure Summary     Date: 01/19/21 Room / Location:  [facility] /  [facility]    Anesthesia Start: 1145 Anesthesia Stop:     Procedure: COLECTOMY AND ILEOSTOMY, POSSIBLE PROCTOCOLECTOMY POUCH, LOOP  (N/A Abdomen) Diagnosis:     Surgeon: Silverio Montero MD Provider: Ashely Henley MD    Anesthesia Type: general with block ASA Status: 2          Anesthesia Type: general with block    Vitals  No vitals data found for the desired time range.          Post Anesthesia Care and Evaluation    Patient location during evaluation: PACU  Patient participation: complete - patient participated  Level of consciousness: awake and alert  Pain score: 0  Pain management: adequate  Airway patency: patent  Anesthetic complications: No anesthetic complications  PONV Status: none  Cardiovascular status: hemodynamically stable and acceptable  Respiratory status: nonlabored ventilation, acceptable and nasal cannula  Hydration status: acceptable

## 2021-01-19 NOTE — H&P
Pre-Op H&P  Craig Smalls  [id number]  1965      Chief complaint: Ulcerative colitis      Subjective:  Patient is a 55 y.o.male presents for scheduled surgery by Dr. Montero. He anticipates a COLECTOMY AND ILEOSTOMY, POSSIBLE PROCTOCOLECTOMY POUCH, LOOP today. He has hx of ulcerative colitis. Last colonoscopy 11/10/20. Pt reports he was found to have dysplagia. He has chronic diarrhea.       Review of Systems:  Constitutional-- No fever, chills or sweats. No fatigue.  CV-- No chest pain, palpitation or syncope  Resp-- No SOB, cough, hemoptysis  Skin--No rashes or lesions      Allergies: No Known Allergies      Home Meds:  Medications Prior to Admission   Medication Sig Dispense Refill Last Dose   • Artificial Tear Ointment (DRY EYES OP) Apply  to eye(s) as directed by provider Daily As Needed (dry eyes).   1/18/2021 at 1330   • escitalopram (LEXAPRO) 10 MG tablet Take 10 mg by mouth Daily.   1/18/2021 at 0830   • Melatonin 10 MG capsule Take 10 mg by mouth Every Night.   1/18/2021 at 2300   • multivitamin with minerals (CENTRUM SILVER PO) Take 1 tablet by mouth Daily.   1/18/2021 at 0830   • prednisoLONE Sodium Phosphate (PREDNISOL OP) Apply 1 drop to eye(s) as directed by provider 4 (Four) Times a Day.   1/19/2021 at 0630   • tamsulosin (FLOMAX) 0.4 MG capsule 24 hr capsule Take 1 capsule by mouth 2 (two) times a day.   1/18/2021 at 0830   • Budesonide (ENTOCORT EC) 3 MG 24 hr capsule Take 9 mg by mouth Every Morning.   1/16/2021   • simethicone (MYLICON) 125 MG chewable tablet Chew 125 mg Every 6 (Six) Hours As Needed for Flatulence.   1/17/2021   • vedolizumab (Entyvio) 300 MG injection Infuse 300 mg into a venous catheter Every 28 (Twenty-Eight) Days.   12/21/2020         PMH:   Past Medical History:   Diagnosis Date   • Enlarged prostate    • Ulcerative colitis, chronic (CMS/HCC)    • Wears glasses     Reading    • Wears hearing aid     non-compliant at times   • Wears partial dentures     UPPER  "PARTIAL     PSH:    Past Surgical History:   Procedure Laterality Date   • COLONOSCOPY         Immunization History:  Influenza:   Pneumococcal: Unknown  Tetanus: UTD      Social History:   Tobacco:   Social History     Tobacco Use   Smoking Status Former Smoker   • Types: Cigarettes   • Quit date: 1997   • Years since quittin.0   Smokeless Tobacco Never Used   Tobacco Comment    \"I smoked a little when I drank\"       Alcohol:     Social History     Substance and Sexual Activity   Alcohol Use Not Currently         Physical Exam:/87 (BP Location: Right arm, Patient Position: Lying)   Pulse 64   Temp 97.9 °F (36.6 °C) (Temporal)   Resp 16   Ht 175.3 cm (69\")   Wt 74.8 kg (165 lb)   SpO2 100%   BMI 24.37 kg/m²       General Appearance:    Alert, cooperative, no distress, appears stated age   Head:    Normocephalic, without obvious abnormality, atraumatic   Lungs:     Clear to auscultation bilaterally, respirations unlabored    Heart:   Regular rate and rhythm, S1 and S2 normal    Abdomen:    Soft without tenderness   Breast Exam:    deferred   Genitalia:    deferred   Extremities:   Extremities normal, atraumatic, no cyanosis or edema   Skin:   Skin color, texture, turgor normal, no rashes or lesions   Neurologic:   Grossly intact     Results Review:     LABS:  Lab Results   Component Value Date    WBC 7.31 2021    HGB 14.1 2021    HCT 42.5 2021    .7 (H) 2021     2021    GLUCOSE 75 2021    BUN 13 2021    CREATININE 0.76 2021    EGFRIFNONA 106 2021    EGFRIFAFRI >60 2018     2021    K 4.1 2021     2021    CO2 25.0 2021    CALCIUM 9.2 2021    ALBUMIN 4.30 2021    AST 17 2021    ALT 13 2021    BILITOT 0.6 2021       RADIOLOGY:  CT abd 20:  Study Result    EXAMINATION: CT ABDOMEN PELVIS W CONTRAST- 2020      INDICATION: K51.90-Ulcerative " colitis, unspecified, without  complications; abdominal pain     TECHNIQUE: Multiple axial CT imaging was obtained of the abdomen and  pelvis following the administration of oral and intravenous contrast.     The radiation dose reduction device was turned on for each scan per the  ALARA (As Low as Reasonably Achievable) protocol.     COMPARISON: NONE     FINDINGS: ABDOMEN: The lung bases are grossly clear with some chronic  changes identified lung bases bilaterally. The liver is homogeneous in  appearance. The spleen is unremarkable. No stones in the gallbladder.  The kidneys and adrenal glands within normal limits. The pancreas is  homogeneous. The abdominal portion of the gastrointestinal tract within  normal limits. No free fluid or free air. No abnormal mass or fluid  collections identified.     PELVIS: There is some mild wall thickening and stranding seen  surrounding the descending colon. There is some mild wall thickening  identified of the splenic flexure. The remainder of the colon is  unremarkable. Findings likely representing patient's known colitis.  There is no pelvic adenopathy. No significant stranding seen surrounding  the sigmoid colon. There is no abnormal mass or fluid collection.  Degenerative changes seen within the spine and pelvis.     Delayed imaging reveals contrast seen in the renal collecting systems  bilaterally as well as within the ureters and bladder with no evidence  of obstruction.     IMPRESSION:  Abnormal wall thickening seen in the splenic flexure and  descending colon likely suggesting patient's known colitis. There is no  significant stranding seen surrounding the sigmoid colon or rectum. The  remainder of the abdomen and pelvis is grossly unremarkable with some  minimal chronic changes seen at the lung bases.       I reviewed the patient's new clinical results.    Cancer Staging (if applicable)  Cancer Patient: __ yes __no __unknown; If yes, clinical stage T:__ N:__M:__, stage  group or __N/A      Impression: Ulcerative colitis       Plan: COLECTOMY AND ILEOSTOMY, POSSIBLE PROCTOCOLECTOMY POUCH, LOOP      Jessica Patino, JINA   1/19/2021   09:16 EST

## 2021-01-19 NOTE — H&P
Admission HP     Patient Name: Craig Smalls  MRN: [id number]  : 1965  DOS: 2021    Attending: Silverio Montero MD    Primary Care Provider: Albert Delgado MD      Patient Care Team:  Albert Delgado MD as PCP - General (Family Medicine)    Chief complaint: Ulcerative colitis    Subjective   Patient is a pleasant 55 y.o. male presented for scheduled surgery by Dr. Silverio Montero  Patient with history of ulcerative colitis for about 7 years.  He has been on biologic therapy Entyvio.  Preop evaluation with CT scan showed thickening of the colon.  Today he underwent extensive procedures listed below under general anesthesia, tolerated surgery well, was admitted for further management.    When seen in his room after surgery, he is doing well, expected postoperative pain.  Already ambulated.  No nausea or vomiting.  No shortness of breath.    Allergies:  No Known Allergies    Meds:  Medications Prior to Admission   Medication Sig Dispense Refill Last Dose   • Artificial Tear Ointment (DRY EYES OP) Apply  to eye(s) as directed by provider Daily As Needed (dry eyes).   2021 at 1330   • escitalopram (LEXAPRO) 10 MG tablet Take 10 mg by mouth Daily.   2021 at 0830   • Melatonin 10 MG capsule Take 10 mg by mouth Every Night.   2021 at 2300   • multivitamin with minerals (CENTRUM SILVER PO) Take 1 tablet by mouth Daily.   2021 at 0830   • prednisoLONE Sodium Phosphate (PREDNISOL OP) Apply 1 drop to eye(s) as directed by provider 4 (Four) Times a Day.   2021 at 0630   • tamsulosin (FLOMAX) 0.4 MG capsule 24 hr capsule Take 1 capsule by mouth 2 (two) times a day.   2021 at 0830   • Budesonide (ENTOCORT EC) 3 MG 24 hr capsule Take 9 mg by mouth Every Morning.   2021   • simethicone (MYLICON) 125 MG chewable tablet Chew 125 mg Every 6 (Six) Hours As Needed for Flatulence.   2021   • vedolizumab (Entyvio) 300 MG injection Infuse 300 mg into a venous catheter Every 28  "(Twenty-Eight) Days.   2020         History:   Past Medical History:   Diagnosis Date   • Enlarged prostate    • Ulcerative colitis, chronic (CMS/HCC)    • Wears glasses     Reading    • Wears hearing aid     non-compliant at times   • Wears partial dentures     UPPER PARTIAL   History of uveitis    Past Surgical History:   Procedure Laterality Date   • COLONOSCOPY       History reviewed. No pertinent family history.  Social History     Tobacco Use   • Smoking status: Former Smoker     Types: Cigarettes     Quit date: 1997     Years since quittin.0   • Smokeless tobacco: Never Used   • Tobacco comment: \"I smoked a little when I drank\"    Substance Use Topics   • Alcohol use: Not Currently   • Drug use: Never   , retired.(Was a teacher/principal/truancy officer)    Review of Systems  Pertinent items are noted in HPI    Vital Signs  /89   Pulse 65   Temp 97.2 °F (36.2 °C) (Temporal)   Resp 16   Ht 175.3 cm (69\")   Wt 74.8 kg (165 lb)   SpO2 100%   BMI 24.37 kg/m²     Physical Exam:    General Appearance:    Alert, cooperative, in no acute distress   Head:    Normocephalic, without obvious abnormality, atraumatic   Eyes:            Lids and lashes normal, conjunctivae and sclerae normal, no   icterus, no pallor, corneas clear   Ears:    Ears appear intact with no abnormalities noted   Throat:   No oral lesions, no thrush, oral mucosa moist   Neck:   No adenopathy, supple, trachea midline, no thyromegaly         Lungs:     Clear to auscultation,respirations regular, even and       unlabored. No wheezes or rales.    Heart:    Regular rhythm and normal rate, normal S1 and S2, no murmur    Abdomen:      Soft, expected tenderness.  Serous drainage on midline incision dressing.  Left JANNET drain with serosanguineous drainage in bulb.  Stoma on the right is pink with a bridge.   Genitalia:    Deferred  Harrison with dark urine.   Extremities:   Moves all extremities well, no edema, no " cyanosis, no              redness   Pulses:   Pulses palpable and equal bilaterally   Skin:   No bleeding, bruising or rash   Neurologic:   Cranial nerves 2 - 12 grossly intact, no gross motor deficit.     Results from last 7 days   Lab Units 01/18/21  1325   WBC 10*3/mm3 7.31   HEMOGLOBIN g/dL 14.1   HEMATOCRIT % 42.5   PLATELETS 10*3/mm3 245     Results from last 7 days   Lab Units 01/18/21  1325   SODIUM mmol/L 140   POTASSIUM mmol/L 4.1   CHLORIDE mmol/L 105   CO2 mmol/L 25.0   BUN mg/dL 13   CREATININE mg/dL 0.76   CALCIUM mg/dL 9.2   BILIRUBIN mg/dL 0.6   ALK PHOS U/L 74   ALT (SGPT) U/L 13   AST (SGOT) U/L 17   GLUCOSE mg/dL 75     Lab Results   Component Value Date    HGBA1C 5.30 01/18/2021       Assessment and Plan:       S/P proctocolectomy( total mesorectal excision, ileal pouch, pouch anal anastomosis, protecting loop ileostomy, resection of accessory spleen)       Chronic ulcerative colitis (CMS/HCC)      Plan  1. Ambulation, several times daily.  2. Pain control-prns   3. IS-encourage  4. DVT proph- Mechanical, subcutaneous heparin  5. Bowel regimen  6. Resume home medications as appropriate  7. Monitor post-op labs  8. DC planning   9. Diet, Clears, advance diet as tolerated.  Watch for bowel function/stoma output.  IVF initially, monitor volume status.    -New stoma:  Wound care stoma nurse consult for stoma care and education throughout patient's hospitalization.    Discussed with patient postop management plan, expressed understanding and agreement.      Dragon disclaimer:  Part of this encounter note is an electronic transcription/translation of spoken language to printed text. The electronic translation of spoken language may permit erroneous, or at times, nonsensical words or phrases to be inadvertently transcribed; Although I have reviewed the note for such errors, some may still exist.    Zane Gonzales MD  01/19/21  18:20 EST

## 2021-01-20 PROBLEM — D72.829 LEUKOCYTOSIS: Status: ACTIVE | Noted: 2021-01-20

## 2021-01-20 PROBLEM — E87.5 HYPERKALEMIA: Status: ACTIVE | Noted: 2021-01-20

## 2021-01-20 PROBLEM — D62 ACUTE BLOOD LOSS ANEMIA: Status: ACTIVE | Noted: 2021-01-20

## 2021-01-20 LAB
ANION GAP SERPL CALCULATED.3IONS-SCNC: 7 MMOL/L (ref 5–15)
BASOPHILS # BLD AUTO: 0.01 10*3/MM3 (ref 0–0.2)
BASOPHILS NFR BLD AUTO: 0.1 % (ref 0–1.5)
BUN SERPL-MCNC: 11 MG/DL (ref 6–20)
BUN/CREAT SERPL: 13.8 (ref 7–25)
CALCIUM SPEC-SCNC: 8.5 MG/DL (ref 8.6–10.5)
CHLORIDE SERPL-SCNC: 103 MMOL/L (ref 98–107)
CO2 SERPL-SCNC: 24 MMOL/L (ref 22–29)
CREAT SERPL-MCNC: 0.8 MG/DL (ref 0.76–1.27)
DEPRECATED RDW RBC AUTO: 42.7 FL (ref 37–54)
EOSINOPHIL # BLD AUTO: 0.29 10*3/MM3 (ref 0–0.4)
EOSINOPHIL NFR BLD AUTO: 2 % (ref 0.3–6.2)
ERYTHROCYTE [DISTWIDTH] IN BLOOD BY AUTOMATED COUNT: 11.4 % (ref 12.3–15.4)
GFR SERPL CREATININE-BSD FRML MDRD: 100 ML/MIN/1.73
GLUCOSE SERPL-MCNC: 178 MG/DL (ref 65–99)
HCT VFR BLD AUTO: 34.5 % (ref 37.5–51)
HGB BLD-MCNC: 11.2 G/DL (ref 13–17.7)
IMM GRANULOCYTES # BLD AUTO: 0.03 10*3/MM3 (ref 0–0.05)
IMM GRANULOCYTES NFR BLD AUTO: 0.2 % (ref 0–0.5)
LYMPHOCYTES # BLD AUTO: 0.68 10*3/MM3 (ref 0.7–3.1)
LYMPHOCYTES NFR BLD AUTO: 4.8 % (ref 19.6–45.3)
MCH RBC QN AUTO: 32.9 PG (ref 26.6–33)
MCHC RBC AUTO-ENTMCNC: 32.5 G/DL (ref 31.5–35.7)
MCV RBC AUTO: 101.5 FL (ref 79–97)
MONOCYTES # BLD AUTO: 1.41 10*3/MM3 (ref 0.1–0.9)
MONOCYTES NFR BLD AUTO: 10 % (ref 5–12)
NEUTROPHILS NFR BLD AUTO: 11.73 10*3/MM3 (ref 1.7–7)
NEUTROPHILS NFR BLD AUTO: 82.9 % (ref 42.7–76)
NRBC BLD AUTO-RTO: 0 /100 WBC (ref 0–0.2)
PLATELET # BLD AUTO: 249 10*3/MM3 (ref 140–450)
PMV BLD AUTO: 9.9 FL (ref 6–12)
POTASSIUM SERPL-SCNC: 5.8 MMOL/L (ref 3.5–5.2)
RBC # BLD AUTO: 3.4 10*6/MM3 (ref 4.14–5.8)
SODIUM SERPL-SCNC: 134 MMOL/L (ref 136–145)
WBC # BLD AUTO: 14.15 10*3/MM3 (ref 3.4–10.8)

## 2021-01-20 PROCEDURE — 85025 COMPLETE CBC W/AUTO DIFF WBC: CPT | Performed by: COLON & RECTAL SURGERY

## 2021-01-20 PROCEDURE — 25010000002 HYDROMORPHONE PER 4 MG: Performed by: COLON & RECTAL SURGERY

## 2021-01-20 PROCEDURE — 25810000003 SODIUM CHLORIDE 0.9 % WITH KCL 20 MEQ 20-0.9 MEQ/L-% SOLUTION: Performed by: COLON & RECTAL SURGERY

## 2021-01-20 PROCEDURE — 80048 BASIC METABOLIC PNL TOTAL CA: CPT | Performed by: COLON & RECTAL SURGERY

## 2021-01-20 PROCEDURE — 25010000002 HEPARIN (PORCINE) PER 1000 UNITS: Performed by: INTERNAL MEDICINE

## 2021-01-20 PROCEDURE — 25010000002 KETOROLAC TROMETHAMINE PER 15 MG: Performed by: COLON & RECTAL SURGERY

## 2021-01-20 PROCEDURE — 63710000001 ONDANSETRON PER 8 MG: Performed by: COLON & RECTAL SURGERY

## 2021-01-20 PROCEDURE — 97161 PT EVAL LOW COMPLEX 20 MIN: CPT | Performed by: PHYSICAL THERAPIST

## 2021-01-20 RX ORDER — HEPARIN SODIUM 5000 [USP'U]/ML
5000 INJECTION, SOLUTION INTRAVENOUS; SUBCUTANEOUS EVERY 8 HOURS SCHEDULED
Status: DISCONTINUED | OUTPATIENT
Start: 2021-01-20 | End: 2021-01-23 | Stop reason: HOSPADM

## 2021-01-20 RX ORDER — SODIUM CHLORIDE 9 MG/ML
100 INJECTION, SOLUTION INTRAVENOUS CONTINUOUS
Status: DISCONTINUED | OUTPATIENT
Start: 2021-01-20 | End: 2021-01-23 | Stop reason: HOSPADM

## 2021-01-20 RX ADMIN — FAMOTIDINE 20 MG: 10 INJECTION, SOLUTION INTRAVENOUS at 10:05

## 2021-01-20 RX ADMIN — Medication 5 MG: at 21:16

## 2021-01-20 RX ADMIN — ACETAMINOPHEN 650 MG: 325 TABLET, FILM COATED ORAL at 18:08

## 2021-01-20 RX ADMIN — HYDROMORPHONE HYDROCHLORIDE 0.5 MG: 1 INJECTION, SOLUTION INTRAMUSCULAR; INTRAVENOUS; SUBCUTANEOUS at 03:11

## 2021-01-20 RX ADMIN — KETOROLAC TROMETHAMINE 15 MG: 15 INJECTION, SOLUTION INTRAMUSCULAR; INTRAVENOUS at 12:27

## 2021-01-20 RX ADMIN — TAMSULOSIN HYDROCHLORIDE 0.4 MG: 0.4 CAPSULE ORAL at 10:05

## 2021-01-20 RX ADMIN — HYDROCODONE BITARTRATE AND ACETAMINOPHEN 1 TABLET: 7.5; 325 TABLET ORAL at 06:05

## 2021-01-20 RX ADMIN — ACETAMINOPHEN 650 MG: 325 TABLET, FILM COATED ORAL at 10:05

## 2021-01-20 RX ADMIN — DEXAMETHASONE SODIUM PHOSPHATE 1 DROP: 1 SOLUTION/ DROPS OPHTHALMIC at 18:08

## 2021-01-20 RX ADMIN — POTASSIUM CHLORIDE AND SODIUM CHLORIDE 100 ML/HR: 900; 150 INJECTION, SOLUTION INTRAVENOUS at 03:14

## 2021-01-20 RX ADMIN — HYDROCODONE BITARTRATE AND ACETAMINOPHEN 1 TABLET: 7.5; 325 TABLET ORAL at 10:51

## 2021-01-20 RX ADMIN — GABAPENTIN 300 MG: 300 CAPSULE ORAL at 21:16

## 2021-01-20 RX ADMIN — KETOROLAC TROMETHAMINE 15 MG: 15 INJECTION, SOLUTION INTRAMUSCULAR; INTRAVENOUS at 00:32

## 2021-01-20 RX ADMIN — DEXAMETHASONE SODIUM PHOSPHATE 1 DROP: 1 SOLUTION/ DROPS OPHTHALMIC at 12:27

## 2021-01-20 RX ADMIN — GABAPENTIN 300 MG: 300 CAPSULE ORAL at 10:05

## 2021-01-20 RX ADMIN — SODIUM CHLORIDE 100 ML/HR: 9 INJECTION, SOLUTION INTRAVENOUS at 21:19

## 2021-01-20 RX ADMIN — KETOROLAC TROMETHAMINE 15 MG: 15 INJECTION, SOLUTION INTRAMUSCULAR; INTRAVENOUS at 06:02

## 2021-01-20 RX ADMIN — SODIUM CHLORIDE 100 ML/HR: 9 INJECTION, SOLUTION INTRAVENOUS at 10:52

## 2021-01-20 RX ADMIN — ESCITALOPRAM OXALATE 10 MG: 10 TABLET ORAL at 10:05

## 2021-01-20 RX ADMIN — TAMSULOSIN HYDROCHLORIDE 0.4 MG: 0.4 CAPSULE ORAL at 21:16

## 2021-01-20 RX ADMIN — GABAPENTIN 300 MG: 300 CAPSULE ORAL at 18:08

## 2021-01-20 RX ADMIN — HYDROCODONE BITARTRATE AND ACETAMINOPHEN 1 TABLET: 7.5; 325 TABLET ORAL at 18:11

## 2021-01-20 RX ADMIN — HYDROCODONE BITARTRATE AND ACETAMINOPHEN 1 TABLET: 7.5; 325 TABLET ORAL at 00:36

## 2021-01-20 RX ADMIN — FAMOTIDINE 20 MG: 10 INJECTION, SOLUTION INTRAVENOUS at 21:16

## 2021-01-20 RX ADMIN — ONDANSETRON HYDROCHLORIDE 4 MG: 4 TABLET, FILM COATED ORAL at 03:50

## 2021-01-20 RX ADMIN — SODIUM CHLORIDE 500 ML: 9 INJECTION, SOLUTION INTRAVENOUS at 12:00

## 2021-01-20 RX ADMIN — ACETAMINOPHEN 650 MG: 325 TABLET, FILM COATED ORAL at 03:07

## 2021-01-20 RX ADMIN — HEPARIN SODIUM 5000 UNITS: 5000 INJECTION INTRAVENOUS; SUBCUTANEOUS at 21:17

## 2021-01-20 RX ADMIN — DEXAMETHASONE SODIUM PHOSPHATE 1 DROP: 1 SOLUTION/ DROPS OPHTHALMIC at 00:32

## 2021-01-20 RX ADMIN — SODIUM CHLORIDE 500 ML: 9 INJECTION, SOLUTION INTRAVENOUS at 13:00

## 2021-01-20 RX ADMIN — KETOROLAC TROMETHAMINE 15 MG: 15 INJECTION, SOLUTION INTRAMUSCULAR; INTRAVENOUS at 18:08

## 2021-01-20 NOTE — PROGRESS NOTES
Discharge Planning Assessment  [facility]     Patient Name: Craig Smalls  MRN: [id number]  Today's Date: 1/20/2021    Admit Date: 1/19/2021    Discharge Needs Assessment     Row Name 01/20/21 0951       Living Environment    Lives With  spouse    Name(s) of Who Lives With Patient  wife Jerri [phone]    Current Living Arrangements  home/apartment/condo    Primary Care Provided by  self    Provides Primary Care For  no one    Family Caregiver if Needed  spouse    Family Caregiver Names  wife Jerri [phone]    Quality of Family Relationships  helpful;involved;supportive    Able to Return to Prior Arrangements  yes       Transition Planning    Patient/Family Anticipates Transition to  home with family    Transportation Anticipated  family or friend will provide       Discharge Needs Assessment    Readmission Within the Last 30 Days  no previous admission in last 30 days    Equipment Currently Used at Home  none        Discharge Plan     Row Name 01/20/21 0952       Plan    Plan  home with wife who will transport    Plan Comments  SW met with patient and wife Jerri [phone] at bedside to discuss discharge planning. Lives in [facility]. Reports functioning independently with ADL’s, No DME or HH. Discussed HH options if needed. Plan is home with wife who will transport.        Continued Care and Services - Admitted Since 1/19/2021    Coordination has not been started for this encounter.         Demographic Summary     Row Name 01/20/21 0951       General Information    Admission Type  inpatient    Arrived From  emergency department    Referral Source  admission list    Reason for Consult  discharge planning        Functional Status     Row Name 01/20/21 0951       Functional Status    Usual Activity Tolerance  good    Current Activity Tolerance  good       Functional Status, IADL    Medications  independent    Meal Preparation  independent    Housekeeping  independent    Laundry   independent    Shopping  independent    IADL Comments  No DME       Employment/    Employment/ Comments  Patient has [employer] medical insurance and can afford medications.        Psychosocial    No documentation.       Abuse/Neglect    No documentation.       Legal    No documentation.       Substance Abuse    No documentation.       Patient Forms    No documentation.           VIKTOR Breen (Kay)

## 2021-01-20 NOTE — PROGRESS NOTES
"IM progress note      Craig Smalls  [id number]  1965     LOS: 1 day     Attending: Silverio Montero MD    Primary Care Provider: Albert Delgado MD      Chief Complaint/Reason for visit: Ulcerative colitis    Subjective   Doing fairly well.  Moderate abdominal pain.  Ambulating halls.  Tolerating clear diet.  Serosanguineous ostomy output. Denies f/c/n/v/sob/cp.    Objective     Vital Signs    Visit Vitals  /68   Pulse 79   Temp 98.3 °F (36.8 °C) (Oral)   Resp 18   Ht 175.3 cm (69\")   Wt 74.8 kg (165 lb)   SpO2 97%   BMI 24.37 kg/m²     Temp (24hrs), Av.8 °F (36.6 °C), Min:97 °F (36.1 °C), Max:99.2 °F (37.3 °C)      Nutrition: Clear liquids    Respiratory: Room air    Physical Exam:     General Appearance:    Alert, cooperative, in no acute distress   Head:    Normocephalic, without obvious abnormality, atraumatic    Lungs:     Normal effort, symmetric chest rise, no crepitus, clear to      auscultation bilaterally             Heart:    Regular rhythm and normal rate, normal S1 and S2   Abdomen:     Soft, expected tenderness.  Serous drainage on midline incision dressing.  Left JANNET drain with serosanguineous drainage in bulb.  Stoma on the right is pink with a bridge.  : Harrison-yellow UOP   Extremities:   No clubbing, cyanosis or edema.  No deformities.    Pulses:   Pulses palpable and equal bilaterally   Skin:   No bleeding, bruising or rash   Neurologic:   Moves all extremities with no obvious focal motor deficit.  Cranial nerves 2 - 12 grossly intact     Results Review:     I reviewed the patient's new clinical results.   Results from last 7 days   Lab Units 21  0416 21  1325   WBC 10*3/mm3 14.15* 7.31   HEMOGLOBIN g/dL 11.2* 14.1   HEMATOCRIT % 34.5* 42.5   PLATELETS 10*3/mm3 249 245     Results from last 7 days   Lab Units 21  0416 21  1325   SODIUM mmol/L 134* 140   POTASSIUM mmol/L 5.8* 4.1   CHLORIDE mmol/L 103 105   CO2 mmol/L 24.0 25.0   BUN mg/dL 11 13 "   CREATININE mg/dL 0.80 0.76   CALCIUM mg/dL 8.5* 9.2   BILIRUBIN mg/dL  --  0.6   ALK PHOS U/L  --  74   ALT (SGPT) U/L  --  13   AST (SGOT) U/L  --  17   GLUCOSE mg/dL 178* 75     I reviewed the patient's new imaging including images and reports.    All medications reviewed.   acetaminophen, 650 mg, Oral, Q8H  dexamethasone, 1 drop, Both Eyes, Q6H  escitalopram, 10 mg, Oral, Daily  famotidine, 20 mg, Intravenous, BID  gabapentin, 300 mg, Oral, TID  heparin (porcine), 5,000 Units, Subcutaneous, Q8H  ketorolac, 15 mg, Intravenous, Q6H  melatonin, 5 mg, Oral, Nightly  tamsulosin, 0.4 mg, Oral, BID        Assessment/Plan     S/P proctocolectomy( total mesorectal excision, ileal pouch, pouch anal anastomosis, protecting loop ileostomy, resection of accessory spleen)       Chronic ulcerative colitis (CMS/HCC)    Leukocytosis, likely reactive    Acute blood loss anemia    Hyperkalemia, likely related to IVF      Plan  1. Ambulation  2. Pain control-prns   3. IS-encouraged  4. DVT proph- mechs/heparin. (We will hold next dose of heparin due to hyperkalemia)  5. Bowel regimen  6.  Clear liquid diet.  Changed IVF due to hyperkalemia  7. Monitor post-op labs  8. DC planning for home     IV fluid bolus due to some orthostatic hypotension    Harrison out when okay with Dr. Montero    -New stoma:  Wound care stoma nurse consult for stoma care and education throughout patient's hospitalization.      Jessica Patino, APRN  01/20/21  13:41 EST

## 2021-01-20 NOTE — PROGRESS NOTES
"[facility] ([facility]  \  Comfortable    Vital Signs:  Blood pressure 122/75, pulse 108, temperature 98.3 °F (36.8 °C), temperature source Oral, resp. rate 18, height 175.3 cm (69\"), weight 74.8 kg (165 lb), SpO2 97 %.    Labs past 24 hours:  Lab Results (last 24 hours)     Procedure Component Value Units Date/Time    Tissue Pathology Exam [[id number]] Collected: 01/19/21 1233    Specimen: Tissue from Spleen; Tissue from Omentum; Tissue from Large Intestine, Rectum; Tissue from Colon Updated: 01/20/21 0648    Basic Metabolic Panel [[id number]]  (Abnormal) Collected: 01/20/21 0416    Specimen: Blood Updated: 01/20/21 0535     Glucose 178 mg/dL      BUN 11 mg/dL      Creatinine 0.80 mg/dL      Sodium 134 mmol/L      Potassium 5.8 mmol/L      Comment: Slight hemolysis detected by analyzer. Results may be affected.        Chloride 103 mmol/L      CO2 24.0 mmol/L      Calcium 8.5 mg/dL      eGFR Non African Amer 100 mL/min/1.73      BUN/Creatinine Ratio 13.8     Anion Gap 7.0 mmol/L     Narrative:      GFR Normal >60  Chronic Kidney Disease <60  Kidney Failure <15      CBC & Differential [[id number]]  (Abnormal) Collected: 01/20/21 0416    Specimen: Blood Updated: 01/20/21 0509    Narrative:      The following orders were created for panel order CBC & Differential.  Procedure                               Abnormality         Status                     ---------                               -----------         ------                     CBC Auto Differential[[id number]]        Abnormal            Final result                 Please view results for these tests on the individual orders.    CBC Auto Differential [[id number]]  (Abnormal) Collected: 01/20/21 0416    Specimen: Blood Updated: 01/20/21 0509     WBC 14.15 10*3/mm3      RBC 3.40 10*6/mm3      Hemoglobin 11.2 g/dL      Comment: Result chucked        Hematocrit 34.5 %      .5 fL      MCH 32.9 pg      MCHC 32.5 g/dL      RDW " 11.4 %      RDW-SD 42.7 fl      MPV 9.9 fL      Platelets 249 10*3/mm3      Neutrophil % 82.9 %      Lymphocyte % 4.8 %      Monocyte % 10.0 %      Eosinophil % 2.0 %      Basophil % 0.1 %      Immature Grans % 0.2 %      Neutrophils, Absolute 11.73 10*3/mm3      Lymphocytes, Absolute 0.68 10*3/mm3      Monocytes, Absolute 1.41 10*3/mm3      Eosinophils, Absolute 0.29 10*3/mm3      Basophils, Absolute 0.01 10*3/mm3      Immature Grans, Absolute 0.03 10*3/mm3      nRBC 0.0 /100 WBC           I/O last shift:  I/O this shift:  In: 1000 [IV Piggyback:1000]  Out: 380 [Drains:80; Stool:300]     PHYSICAL EXAM-  Comfortable  cv- rsr  Chest- clear, =  Abd- soft, mild distention, JANNET appropriate, stoma with good function, Harrison    Pathology:  Order Name Source Comment Collection Info Order Time   ABO/RH SPECIMEN VERIFICATION PREOP   Collected By: Vane Rivera RN 1/19/2021  8:40 AM   TISSUE PATHOLOGY EXAM Spleen  Collected By: Silverio Montero MD 1/19/2021 12:51 PM   .    Assessment and Plan:  Harrison in place until Friday following pelvic dissection/reconstruction.  Findings at surgery reviewed, pouch, reconstruction, reach, short-term, long-term goals.  We discussed expectation for some drainage from the anal region, likely some old blood.  Frequent ambulation encouraged, 3 walks already today  Avoid forcing diet  Harrison to be removed on Friday.    Silverio Montero MD  01/20/21  18:11 EST

## 2021-01-20 NOTE — NURSING NOTE
wocn follow up for new loop ileostomy.    One piece in place, well fitting. 150mL of dark brown liquid stool emptied from bag. Per patient nursing had just emptied.     Wife and patient receptive to teaching. Education reviewed about diet, emptying, wear time, resources, emergency kits, travel and general expectations. Wife took notes.     Stoma slightly swollen, protruding above skin level, red, moist and viable; bridge in place.     wocn to follow. Contact with questions or concerns.

## 2021-01-20 NOTE — PLAN OF CARE
Goal Outcome Evaluation:  Plan of Care Reviewed With: patient  Progress: improving  Outcome Summary: PT evaluation completed.  Pt transferred sit<-->stand with CGA, ambulated 200 feet using rw with CGAx1, and transferred sit-->supine with SBAx1.   Pt stood x 3 reps from recliner and had to sit twice due to becoming pale and lightheaded.  Wife followed with chair for safety during ambulation.  BPs as follows: Sitting - 94/71; Standing - 76/62; Supine - 110/74.  Skilled PT services warranted to improve mobility and safety.  Recommend d/c home with assist.

## 2021-01-20 NOTE — PLAN OF CARE
Goal Outcome Evaluation:  Plan of Care Reviewed With: patient  Progress: improving       Pts VSS.  Pts heart rate in the low 100's.  Placed on telemetry for evaluation.  Pts ostomy site bleeding has slowed down.  Pt c/o burping a lot tonight.  Zofran given.  Medicated with Dilaudid tonight for pain.  Pt abdominal incision with a small amount of dried drainage under the dressing.  Pt advised to walk with assist.

## 2021-01-20 NOTE — THERAPY EVALUATION
Patient Name: Craig Smalls  : 1965    MRN: [id number]                              Today's Date: 2021       Admit Date: 2021    Visit Dx:     ICD-10-CM ICD-9-CM   1. Chronic ulcerative colitis (CMS/HCC)  K51.90 556.9   2. History of ulcerative colitis  Z87.19 V12.79     Patient Active Problem List   Diagnosis   • Chronic ulcerative colitis (CMS/HCC)   • S/P proctocolectomy( total mesorectal excision, ileal pouch, pouch anal anastomosis, protecting loop ileostomy, resection of accessory spleen)        Past Medical History:   Diagnosis Date   • Enlarged prostate    • Ulcerative colitis, chronic (CMS/HCC)    • Wears glasses     Reading    • Wears hearing aid     non-compliant at times   • Wears partial dentures     UPPER PARTIAL     Past Surgical History:   Procedure Laterality Date   • ABDOMINAL PERINEAL RESECTION N/A 2021    Procedure: Proctocolectomy, total mesorectal excision, Ileal pouch, Pouch anal anastomosis, Protecting loop ileostomy, Resection of accessory spleen;  Surgeon: Silverio Montero MD;  Location: [facility];  Service: General;  Laterality: N/A;   • COLONOSCOPY       General Information     Row Name 21 1014          Physical Therapy Time and Intention    Document Type  evaluation  -LM     Mode of Treatment  individual therapy;physical therapy  -LM     Row Name 21 1014          General Information    Patient Profile Reviewed  yes  -LM     Prior Level of Function  independent:;all household mobility;gait;ADL's  -LM     Existing Precautions/Restrictions  fall;other (see comments) Orthostatic Hypotension; Ileostomy; JANNET Drain  -LM     Barriers to Rehab  none identified  -LM     Row Name 21 1014          Living Environment    Lives With  spouse;child(kiersten), adult  -LM     Row Name 21 1014          Home Main Entrance    Number of Stairs, Main Entrance  seven  -LM     Stair Railings, Main Entrance  railing on left side (ascending)  -LM     Row Name  01/20/21 1014          Stairs Within Home, Primary    Stairs, Within Home, Primary  2 stairs to bedroom with no HR; 7 steps to game room with 1 HR  -LM     Number of Stairs, Within Home, Primary  nine  -LM     Row Name 01/20/21 1014          Cognition    Orientation Status (Cognition)  oriented x 4  -LM     Row Name 01/20/21 1014          Safety Issues, Functional Mobility    Safety Issues Affecting Function (Mobility)  insight into deficits/self-awareness;awareness of need for assistance;safety precaution awareness;safety precautions follow-through/compliance  -LM     Impairments Affecting Function (Mobility)  balance;endurance/activity tolerance;pain  -LM     Comment, Safety Issues/Impairments (Mobility)  Orthostatic Hypotension  -LM       User Key  (r) = Recorded By, (t) = Taken By, (c) = Cosigned By    Initials Name Provider Type    LM Malika Orona, PT Physical Therapist        Mobility     Row Name 01/20/21 1014          Bed Mobility    Bed Mobility  sit-supine  -LM     Sit-Supine [place] (Bed Mobility)  standby assist  -LM     Comment (Bed Mobility)  Increased time/effort needed due to pain  -LM     Row Name 01/20/21 1014          Transfers    Comment (Transfers)  (S) Pt stood x 3 reps from chair.  Attempted to get standing BP on 2nd stand - pt became pale and had to sit.  BP did not read.  Attempted again on 3rd stand, but pt had to sit before pressure read - BP noted to be 76/62.  -LM     Row Name 01/20/21 1014          Sit-Stand Transfer    Sit-Stand [place] (Transfers)  contact guard;1 person assist;verbal cues  -LM     Assistive Device (Sit-Stand Transfers)  walker, front-wheeled  -LM     Row Name 01/20/21 1014          Gait/Stairs (Locomotion)    [place] Level (Gait)  contact guard;1 person assist;verbal cues  -LM     Assistive Device (Gait)  walker, front-wheeled  -LM     Distance in Feet (Gait)  200 feet  -LM     Comment (Gait/Stairs)  Pt ambulated at a good pace.  Wife followed with  chair in case pt became pale/dizzy again, but pt was able to safely ambulate without having to sit.  -LM       User Key  (r) = Recorded By, (t) = Taken By, (c) = Cosigned By    Initials Name Provider Type    Malika Quevedo PT Physical Therapist        Obj/Interventions     Row Name 01/20/21 1014          Range of Motion Comprehensive    General Range of Motion  bilateral lower extremity ROM WFL  -LM     Row Name 01/20/21 1014          Strength Comprehensive (MMT)    General Manual Muscle Testing (MMT) Assessment  no strength deficits identified BLEs  -LM     Row Name 01/20/21 1014          Balance    Balance Assessment  sitting static balance;standing static balance;standing dynamic balance  -LM     Static Sitting Balance  WFL;sitting in chair;sitting, edge of bed  -LM     Static Standing Balance  WFL;supported  -LM     Dynamic Standing Balance  mild impairment;supported  -LM     Row Name 01/20/21 1014          Sensory Assessment (Somatosensory)    Sensory Assessment (Somatosensory)  LE sensation intact  -LM       User Key  (r) = Recorded By, (t) = Taken By, (c) = Cosigned By    Initials Name Provider Type    Malika Quevedo PT Physical Therapist        Goals/Plan     Row Name 01/20/21 1014          Transfer Goal 1 (PT)    Activity/Assistive Device (Transfer Goal 1, PT)  bed-to-chair/chair-to-bed  -LM     [place] Level/Cues Needed (Transfer Goal 1, PT)  independent  -LM     Time Frame (Transfer Goal 1, PT)  long term goal (LTG);1 week  -LM     Row Name 01/20/21 1014          Gait Training Goal 1 (PT)    Activity/Assistive Device (Gait Training Goal 1, PT)  gait (walking locomotion)  -LM     [place] Level (Gait Training Goal 1, PT)  independent  -LM     Distance (Gait Training Goal 1, PT)  500 feet  -LM     Time Frame (Gait Training Goal 1, PT)  long term goal (LTG);1 week  -LM     Row Name 01/20/21 1014          Stairs Goal 1 (PT)    Activity/Assistive Device (Stairs Goal 1, PT)  ascending  stairs;descending stairs;using handrail, left  -LM     [place] Level/Cues Needed (Stairs Goal 1, PT)  modified independence  -LM     Number of Stairs (Stairs Goal 1, PT)  12  -LM     Time Frame (Stairs Goal 1, PT)  long term goal (LTG);1 week  -LM       User Key  (r) = Recorded By, (t) = Taken By, (c) = Cosigned By    Initials Name Provider Type    LM Malika Orona, PT Physical Therapist        Clinical Impression     Row Name 01/20/21 1014          Pain    Additional Documentation  Pain Scale: Numbers Pre/Post-Treatment (Group)  -LM     Row Name 01/20/21 1014          Pain Scale: Numbers Pre/Post-Treatment    Pretreatment Pain Rating  5/10  -LM     Posttreatment Pain Rating  5/10  -LM     Pain Location - Orientation  incisional  -LM     Pain Location  abdomen  -LM     Pain Intervention(s)  Repositioned;Ambulation/increased activity  -LM     Row Name 01/20/21 1014          Plan of Care Review    Plan of Care Reviewed With  patient  -LM     Outcome Summary  PT evaluation completed.  Pt transferred sit<-->stand with CGA, ambulated 200 feet using rw with CGAx1, and transferred sit-->supine with SBAx1.   Pt stood x 3 reps from recliner and had to sit twice due to becoming pale and lightheaded.  Wife followed with chair for safety during ambulation.  BPs as follows: Sitting - 94/71; Standing - 76/62; Supine - 110/74.  Skilled PT services warranted to improve mobility and safety.  Recommend d/c home with assist.  -LM     Row Name 01/20/21 1014          Therapy Assessment/Plan (PT)    Rehab Potential (PT)  good, to achieve stated therapy goals  -LM     Criteria for Skilled Interventions Met (PT)  yes;meets criteria;skilled treatment is necessary  -LM     Row Name 01/20/21 1014          Vital Signs    Pre Systolic BP Rehab  94  -LM     Pre Treatment Diastolic BP  71  -LM     Intra Systolic BP Rehab  (S) 76 Standing  -LM     Intra Treatment Diastolic BP  (S) 62 Standing  -LM     Post Systolic BP Rehab  110  -LM     Post  Treatment Diastolic BP  74  -LM     Pretreatment Heart Rate (beats/min)  93  -LM     Posttreatment Heart Rate (beats/min)  90  -LM     Pre SpO2 (%)  97  -LM     O2 Delivery Pre Treatment  room air  -LM     Post SpO2 (%)  96  -LM     O2 Delivery Post Treatment  room air  -LM     Pre Patient Position  Sitting  -LM     Intra Patient Position  Standing  -LM     Post Patient Position  Supine  -LM     Row Name 01/20/21 1014          Positioning and Restraints    Pre-Treatment Position  sitting in chair/recliner  -LM     Post Treatment Position  bed  -LM     In Bed  supine;call light within reach;encouraged to call for assist;with family/caregiver;with nsg  -LM       User Key  (r) = Recorded By, (t) = Taken By, (c) = Cosigned By    Initials Name Provider Type    Malika Quevedo PT Physical Therapist        Outcome Measures     Row Name 01/20/21 1014          How much help from another person do you currently need...    Turning from your back to your side while in flat bed without using bedrails?  4  -LM     Moving from lying on back to sitting on the side of a flat bed without bedrails?  3  -LM     Moving to and from a bed to a chair (including a wheelchair)?  3  -LM     Standing up from a chair using your arms (e.g., wheelchair, bedside chair)?  3  -LM     Climbing 3-5 steps with a railing?  3  -LM     To walk in hospital room?  3  -LM     AM-PAC 6 Clicks Score (PT)  19  -LM     Row Name 01/20/21 1014          Functional Assessment    Outcome Measure Options  AM-PAC 6 Clicks Basic Mobility (PT)  -LM       User Key  (r) = Recorded By, (t) = Taken By, (c) = Cosigned By    Initials Name Provider Type    Malika Quevedo PT Physical Therapist        Physical Therapy Education                 Title: PT OT SLP Therapies (In Progress)     Topic: Physical Therapy (In Progress)     Point: Mobility training (In Progress)     Learning Progress Summary           Patient Acceptance, E, NR by PARRIS at 1/20/2021 1112                    Point: Precautions (In Progress)     Learning Progress Summary           Patient Acceptance, E, NR by  at 1/20/2021 1112                               User Key     Initials Effective Dates Name Provider Type Discipline     07/24/19 -  Malika Orona PT Physical Therapist PT              PT Recommendation and Plan  Planned Therapy Interventions (PT): balance training, bed mobility training, gait training, home exercise program, motor coordination training, neuromuscular re-education, patient/family education, postural re-education, ROM (range of motion), stair training, strengthening, stretching, transfer training  Plan of Care Reviewed With: patient  Outcome Summary: PT evaluation completed.  Pt transferred sit<-->stand with CGA, ambulated 200 feet using rw with CGAx1, and transferred sit-->supine with SBAx1.   Pt stood x 3 reps from recliner and had to sit twice due to becoming pale and lightheaded.  Wife followed with chair for safety during ambulation.  BPs as follows: Sitting - 94/71; Standing - 76/62; Supine - 110/74.  Skilled PT services warranted to improve mobility and safety.  Recommend d/c home with assist.     Time Calculation:   PT Charges     Row Name 01/20/21 1014             Time Calculation    Start Time  1014  -LM      PT Received On  01/20/21  -      PT Goal Re-Cert Due Date  01/30/21  -        User Key  (r) = Recorded By, (t) = Taken By, (c) = Cosigned By    Initials Name Provider Type     Malika Orona PT Physical Therapist        Therapy Charges for Today     Code Description Service Date Service Provider Modifiers Qty    [id number] HC PT EVAL LOW COMPLEXITY 4 1/20/2021 Malika Orona PT GP 1          PT G-Codes  Outcome Measure Options: AM-PAC 6 Clicks Basic Mobility (PT)  AM-PAC 6 Clicks Score (PT): 19    Malika Orona PT  1/20/2021

## 2021-01-20 NOTE — NURSING NOTE
Got patient up to walk and he became dizzy and flushed in the face and had to be put back into bed. Pt is not ready to walk just yet. States will attempt to walk later on today.....

## 2021-01-21 LAB
ANION GAP SERPL CALCULATED.3IONS-SCNC: 6 MMOL/L (ref 5–15)
BASOPHILS # BLD AUTO: 0.03 10*3/MM3 (ref 0–0.2)
BASOPHILS NFR BLD AUTO: 0.2 % (ref 0–1.5)
BUN SERPL-MCNC: 9 MG/DL (ref 6–20)
BUN/CREAT SERPL: 14.3 (ref 7–25)
CALCIUM SPEC-SCNC: 8.2 MG/DL (ref 8.6–10.5)
CHLORIDE SERPL-SCNC: 102 MMOL/L (ref 98–107)
CO2 SERPL-SCNC: 26 MMOL/L (ref 22–29)
CREAT SERPL-MCNC: 0.63 MG/DL (ref 0.76–1.27)
CYTO UR: NORMAL
DEPRECATED RDW RBC AUTO: 41.8 FL (ref 37–54)
EOSINOPHIL # BLD AUTO: 0.03 10*3/MM3 (ref 0–0.4)
EOSINOPHIL NFR BLD AUTO: 0.2 % (ref 0.3–6.2)
ERYTHROCYTE [DISTWIDTH] IN BLOOD BY AUTOMATED COUNT: 11.4 % (ref 12.3–15.4)
GFR SERPL CREATININE-BSD FRML MDRD: 131 ML/MIN/1.73
GLUCOSE SERPL-MCNC: 101 MG/DL (ref 65–99)
HCT VFR BLD AUTO: 23.5 % (ref 37.5–51)
HGB BLD-MCNC: 8 G/DL (ref 13–17.7)
IMM GRANULOCYTES # BLD AUTO: 0.06 10*3/MM3 (ref 0–0.05)
IMM GRANULOCYTES NFR BLD AUTO: 0.5 % (ref 0–0.5)
LAB AP CASE REPORT: NORMAL
LAB AP CLINICAL INFORMATION: NORMAL
LAB AP DIAGNOSIS COMMENT: NORMAL
LYMPHOCYTES # BLD AUTO: 1.23 10*3/MM3 (ref 0.7–3.1)
LYMPHOCYTES NFR BLD AUTO: 9.3 % (ref 19.6–45.3)
MCH RBC QN AUTO: 34.5 PG (ref 26.6–33)
MCHC RBC AUTO-ENTMCNC: 34 G/DL (ref 31.5–35.7)
MCV RBC AUTO: 101.3 FL (ref 79–97)
MONOCYTES # BLD AUTO: 0.87 10*3/MM3 (ref 0.1–0.9)
MONOCYTES NFR BLD AUTO: 6.6 % (ref 5–12)
NEUTROPHILS NFR BLD AUTO: 11.03 10*3/MM3 (ref 1.7–7)
NEUTROPHILS NFR BLD AUTO: 83.2 % (ref 42.7–76)
NRBC BLD AUTO-RTO: 0 /100 WBC (ref 0–0.2)
PATH REPORT.FINAL DX SPEC: NORMAL
PATH REPORT.GROSS SPEC: NORMAL
PLATELET # BLD AUTO: 180 10*3/MM3 (ref 140–450)
PMV BLD AUTO: 10.4 FL (ref 6–12)
POTASSIUM SERPL-SCNC: 4.3 MMOL/L (ref 3.5–5.2)
RBC # BLD AUTO: 2.32 10*6/MM3 (ref 4.14–5.8)
SODIUM SERPL-SCNC: 134 MMOL/L (ref 136–145)
WBC # BLD AUTO: 13.25 10*3/MM3 (ref 3.4–10.8)

## 2021-01-21 PROCEDURE — 80048 BASIC METABOLIC PNL TOTAL CA: CPT | Performed by: NURSE PRACTITIONER

## 2021-01-21 PROCEDURE — 25010000002 KETOROLAC TROMETHAMINE PER 15 MG: Performed by: COLON & RECTAL SURGERY

## 2021-01-21 PROCEDURE — 85025 COMPLETE CBC W/AUTO DIFF WBC: CPT | Performed by: COLON & RECTAL SURGERY

## 2021-01-21 PROCEDURE — 25010000002 HEPARIN (PORCINE) PER 1000 UNITS: Performed by: INTERNAL MEDICINE

## 2021-01-21 RX ORDER — FAMOTIDINE 20 MG/1
20 TABLET, FILM COATED ORAL EVERY 12 HOURS SCHEDULED
Status: DISCONTINUED | OUTPATIENT
Start: 2021-01-21 | End: 2021-01-23 | Stop reason: HOSPADM

## 2021-01-21 RX ORDER — ZOLPIDEM TARTRATE 5 MG/1
5 TABLET ORAL NIGHTLY PRN
Status: DISCONTINUED | OUTPATIENT
Start: 2021-01-21 | End: 2021-01-23 | Stop reason: HOSPADM

## 2021-01-21 RX ADMIN — ESCITALOPRAM OXALATE 10 MG: 10 TABLET ORAL at 09:21

## 2021-01-21 RX ADMIN — KETOROLAC TROMETHAMINE 15 MG: 15 INJECTION, SOLUTION INTRAMUSCULAR; INTRAVENOUS at 13:26

## 2021-01-21 RX ADMIN — HEPARIN SODIUM 5000 UNITS: 5000 INJECTION INTRAVENOUS; SUBCUTANEOUS at 13:26

## 2021-01-21 RX ADMIN — FAMOTIDINE 20 MG: 10 INJECTION, SOLUTION INTRAVENOUS at 09:21

## 2021-01-21 RX ADMIN — KETOROLAC TROMETHAMINE 15 MG: 15 INJECTION, SOLUTION INTRAMUSCULAR; INTRAVENOUS at 05:04

## 2021-01-21 RX ADMIN — TAMSULOSIN HYDROCHLORIDE 0.4 MG: 0.4 CAPSULE ORAL at 09:21

## 2021-01-21 RX ADMIN — ACETAMINOPHEN 650 MG: 325 TABLET, FILM COATED ORAL at 10:53

## 2021-01-21 RX ADMIN — GABAPENTIN 300 MG: 300 CAPSULE ORAL at 21:12

## 2021-01-21 RX ADMIN — KETOROLAC TROMETHAMINE 15 MG: 15 INJECTION, SOLUTION INTRAMUSCULAR; INTRAVENOUS at 00:16

## 2021-01-21 RX ADMIN — DEXAMETHASONE SODIUM PHOSPHATE 1 DROP: 1 SOLUTION/ DROPS OPHTHALMIC at 13:26

## 2021-01-21 RX ADMIN — HYDROCODONE BITARTRATE AND ACETAMINOPHEN 1 TABLET: 7.5; 325 TABLET ORAL at 05:07

## 2021-01-21 RX ADMIN — Medication 5 MG: at 21:12

## 2021-01-21 RX ADMIN — ACETAMINOPHEN 650 MG: 325 TABLET, FILM COATED ORAL at 02:26

## 2021-01-21 RX ADMIN — HEPARIN SODIUM 5000 UNITS: 5000 INJECTION INTRAVENOUS; SUBCUTANEOUS at 05:04

## 2021-01-21 RX ADMIN — TAMSULOSIN HYDROCHLORIDE 0.4 MG: 0.4 CAPSULE ORAL at 21:12

## 2021-01-21 RX ADMIN — SODIUM CHLORIDE 100 ML/HR: 9 INJECTION, SOLUTION INTRAVENOUS at 10:53

## 2021-01-21 RX ADMIN — GABAPENTIN 300 MG: 300 CAPSULE ORAL at 17:28

## 2021-01-21 RX ADMIN — FAMOTIDINE 20 MG: 20 TABLET, FILM COATED ORAL at 21:12

## 2021-01-21 RX ADMIN — DEXAMETHASONE SODIUM PHOSPHATE 1 DROP: 1 SOLUTION/ DROPS OPHTHALMIC at 05:36

## 2021-01-21 RX ADMIN — ACETAMINOPHEN 650 MG: 325 TABLET, FILM COATED ORAL at 17:28

## 2021-01-21 RX ADMIN — GABAPENTIN 300 MG: 300 CAPSULE ORAL at 09:21

## 2021-01-21 RX ADMIN — HEPARIN SODIUM 5000 UNITS: 5000 INJECTION INTRAVENOUS; SUBCUTANEOUS at 21:12

## 2021-01-21 RX ADMIN — ZOLPIDEM TARTRATE 5 MG: 5 TABLET, FILM COATED ORAL at 21:12

## 2021-01-21 RX ADMIN — DEXAMETHASONE SODIUM PHOSPHATE 1 DROP: 1 SOLUTION/ DROPS OPHTHALMIC at 17:28

## 2021-01-21 RX ADMIN — SODIUM CHLORIDE 100 ML/HR: 9 INJECTION, SOLUTION INTRAVENOUS at 21:43

## 2021-01-21 RX ADMIN — DEXAMETHASONE SODIUM PHOSPHATE 1 DROP: 1 SOLUTION/ DROPS OPHTHALMIC at 00:16

## 2021-01-21 NOTE — PLAN OF CARE
Problem: Adult Inpatient Plan of Care  Goal: Plan of Care Review  Outcome: Ongoing, Progressing  Flowsheets  Taken 1/21/2021 1522 by Gunjan Smalls, RN  Outcome Summary: VSS, pt medicated with scheduled pain meds. Up walking in hallway, Harrison to be out in the morning. Will continue to monitor.  Taken 1/21/2021 0318 by Alice Longoria, RN  Plan of Care Reviewed With: patient  Taken 1/20/2021 0339 by Saritha Guadalupe RN  Progress: improving   Goal Outcome Evaluation:  Plan of Care Reviewed With: patient  Progress: improving  Outcome Summary: VSS, pt medicated with scheduled pain meds. Up walking in hallway, Harrison to be out in the morning. Will continue to monitor.

## 2021-01-21 NOTE — PROGRESS NOTES
Doing well  For walks in the hallway today  Labs and data look good  Vital signs look good  The abdomen is soft, nondistended  Incision looks good  Stoma with good function, viscus output  JANNET drain serosanguineous, reasonable character and volume of output    Orders placed for Ambien tonight at the patient's request  Plan to DC Harrison and JANNET drain in the morning  Bridge removal anticipated tomorrow, good progress with stoma instruction  Anticipated discharge tomorrow afternoon.  Discharge instructions reviewed

## 2021-01-21 NOTE — NURSING NOTE
wocn follow up for ileostomy education.    Patient & wife ready for education. wocn reviewed emptying process and when to empty. Pouch was extremely full with 900 mL dark brown/black liquid stool.  Products reviewed. patient in one piece douglas flat but will likely need convexity. Difference of one piece and two piece options shown to patient. Diet, ordering supplies and how and when to change appliance reviewed. Upon assessment, medial aspect of appliance is loose and scant amount of stool now on abdominal incision dressing.    Appliance changed. Stoma is swollen, red moist and viable, protruding above skin level. Bridge in place. Peristomal skin is intact and mc junction intact. Wife cut new wafer and patient and wife practiced snapping pouch to wafer. Wafer applied and patient held hand over wafer to apply heat.      Plan is for bridge out tomorrow and patient home tomorrow night or Saturday morning.     wocn will follow. Please contact with any questions or concerns.

## 2021-01-21 NOTE — PLAN OF CARE
Goal Outcome Evaluation:  Plan of Care Reviewed With: patient  Progress: improving  Outcome Summary: VSS. Patient did not express any concerns this shift. Patient slept through the night.

## 2021-01-21 NOTE — PROGRESS NOTES
"IM progress note      Craig Smalls  [id number]  1965     LOS: 2 days     Attending: Silverio Montero MD    Primary Care Provider: Albert Delgado MD      Chief Complaint/Reason for visit: Ulcerative colitis    Subjective   Doing fairly well.  Fair pain control. Some burping . On clears. No n/vom/ no sob. Hasn't yet ambulated today.     Objective     Vital Signs    Visit Vitals  /60   Pulse 99   Temp 98.7 °F (37.1 °C) (Oral)   Resp 20   Ht 175.3 cm (69\")   Wt 74.8 kg (165 lb)   SpO2 98%   BMI 24.37 kg/m²     Temp (24hrs), Av.5 °F (36.9 °C), Min:98.3 °F (36.8 °C), Max:98.7 °F (37.1 °C)      Nutrition: Clear liquids    Respiratory: Room air    Physical Exam:     General Appearance:    Alert, cooperative, in no acute distress   Head:    Normocephalic, without obvious abnormality, atraumatic    Lungs:     Normal effort, symmetric chest rise, no crepitus, clear to      auscultation bilaterally             Heart:    Regular rhythm and normal rate, normal S1 and S2   Abdomen:     Soft, expected tenderness.  Serous drainage on midline incision dressing.  Left JANNET drain with serosanguineous drainage in bulb.  Stoma on the right is pink with a bridge. Liquid brown output.  : Harrison-yellow UOP   Extremities:   No clubbing, cyanosis or edema.  No deformities.    Pulses:   Pulses palpable and equal bilaterally   Skin:   No bleeding, bruising or rash   Neurologic:   Moves all extremities with no obvious focal motor deficit.  Cranial nerves 2 - 12 grossly intact     Results Review:     I reviewed the patient's new clinical results.   Results from last 7 days   Lab Units 21  0448 21  0416 21  1325   WBC 10*3/mm3 13.25* 14.15* 7.31   HEMOGLOBIN g/dL 8.0* 11.2* 14.1   HEMATOCRIT % 23.5* 34.5* 42.5   PLATELETS 10*3/mm3 180 249 245     Results from last 7 days   Lab Units 21  0448 21  0416 21  1325   SODIUM mmol/L 134* 134* 140   POTASSIUM mmol/L 4.3 5.8* 4.1   CHLORIDE mmol/L 102 " 103 105   CO2 mmol/L 26.0 24.0 25.0   BUN mg/dL 9 11 13   CREATININE mg/dL 0.63* 0.80 0.76   CALCIUM mg/dL 8.2* 8.5* 9.2   BILIRUBIN mg/dL  --   --  0.6   ALK PHOS U/L  --   --  74   ALT (SGPT) U/L  --   --  13   AST (SGOT) U/L  --   --  17   GLUCOSE mg/dL 101* 178* 75     I reviewed the patient's new imaging including images and reports.    All medications reviewed.   acetaminophen, 650 mg, Oral, Q8H  dexamethasone, 1 drop, Both Eyes, Q6H  escitalopram, 10 mg, Oral, Daily  famotidine, 20 mg, Intravenous, BID  gabapentin, 300 mg, Oral, TID  heparin (porcine), 5,000 Units, Subcutaneous, Q8H  ketorolac, 15 mg, Intravenous, Q6H  melatonin, 5 mg, Oral, Nightly  tamsulosin, 0.4 mg, Oral, BID        Assessment/Plan     S/P proctocolectomy( total mesorectal excision, ileal pouch, pouch anal anastomosis, protecting loop ileostomy, resection of accessory spleen)       Chronic ulcerative colitis (CMS/HCC)    Leukocytosis, likely reactive    Acute blood loss anemia    Hyperkalemia, likely related to IVF/ treated.      Plan  1. Ambulation, encouraged  2. Pain control-prns   3. IS-encouraged  4. DVT proph- mechs/heparin.    5. Bowel regimen  6.  Clear liquid diet. Advance slowly.   7. Monitor post-op labs  8. DC planning for home     Harrison out when okay with Dr. Montero/ likely tomorrow Friday.    -New stoma:  Wound care stoma nurse consult for stoma care and education throughout patient's hospitalization.    Dicussed with pt and RN.    Zane Gonzales MD  01/21/21  10:39 EST

## 2021-01-22 LAB
ABO GROUP BLD: NORMAL
ANION GAP SERPL CALCULATED.3IONS-SCNC: 5 MMOL/L (ref 5–15)
BLD GP AB SCN SERPL QL: NEGATIVE
BUN SERPL-MCNC: 6 MG/DL (ref 6–20)
BUN/CREAT SERPL: 10.5 (ref 7–25)
CALCIUM SPEC-SCNC: 7.9 MG/DL (ref 8.6–10.5)
CHLORIDE SERPL-SCNC: 103 MMOL/L (ref 98–107)
CO2 SERPL-SCNC: 25 MMOL/L (ref 22–29)
CREAT SERPL-MCNC: 0.57 MG/DL (ref 0.76–1.27)
DEPRECATED RDW RBC AUTO: 42.5 FL (ref 37–54)
ERYTHROCYTE [DISTWIDTH] IN BLOOD BY AUTOMATED COUNT: 11.5 % (ref 12.3–15.4)
GFR SERPL CREATININE-BSD FRML MDRD: 147 ML/MIN/1.73
GLUCOSE SERPL-MCNC: 102 MG/DL (ref 65–99)
HCT VFR BLD AUTO: 21.5 % (ref 37.5–51)
HGB BLD-MCNC: 7.2 G/DL (ref 13–17.7)
MCH RBC QN AUTO: 34.3 PG (ref 26.6–33)
MCHC RBC AUTO-ENTMCNC: 33.5 G/DL (ref 31.5–35.7)
MCV RBC AUTO: 102.4 FL (ref 79–97)
PLATELET # BLD AUTO: 176 10*3/MM3 (ref 140–450)
PMV BLD AUTO: 10.3 FL (ref 6–12)
POTASSIUM SERPL-SCNC: 4 MMOL/L (ref 3.5–5.2)
RBC # BLD AUTO: 2.1 10*6/MM3 (ref 4.14–5.8)
RH BLD: POSITIVE
SODIUM SERPL-SCNC: 133 MMOL/L (ref 136–145)
T&S EXPIRATION DATE: NORMAL
WBC # BLD AUTO: 11.52 10*3/MM3 (ref 3.4–10.8)

## 2021-01-22 PROCEDURE — 86901 BLOOD TYPING SEROLOGIC RH(D): CPT | Performed by: INTERNAL MEDICINE

## 2021-01-22 PROCEDURE — 25010000002 HYDROMORPHONE PER 4 MG: Performed by: COLON & RECTAL SURGERY

## 2021-01-22 PROCEDURE — 86900 BLOOD TYPING SEROLOGIC ABO: CPT | Performed by: INTERNAL MEDICINE

## 2021-01-22 PROCEDURE — 86850 RBC ANTIBODY SCREEN: CPT | Performed by: INTERNAL MEDICINE

## 2021-01-22 PROCEDURE — 36430 TRANSFUSION BLD/BLD COMPNT: CPT

## 2021-01-22 PROCEDURE — 25010000002 HEPARIN (PORCINE) PER 1000 UNITS: Performed by: INTERNAL MEDICINE

## 2021-01-22 PROCEDURE — 86900 BLOOD TYPING SEROLOGIC ABO: CPT

## 2021-01-22 PROCEDURE — 80048 BASIC METABOLIC PNL TOTAL CA: CPT | Performed by: INTERNAL MEDICINE

## 2021-01-22 PROCEDURE — P9016 RBC LEUKOCYTES REDUCED: HCPCS

## 2021-01-22 PROCEDURE — 86923 COMPATIBILITY TEST ELECTRIC: CPT

## 2021-01-22 PROCEDURE — 85027 COMPLETE CBC AUTOMATED: CPT | Performed by: INTERNAL MEDICINE

## 2021-01-22 RX ORDER — ACETAMINOPHEN 650 MG/1
650 SUPPOSITORY RECTAL ONCE
Status: COMPLETED | OUTPATIENT
Start: 2021-01-22 | End: 2021-01-22

## 2021-01-22 RX ORDER — ACETAMINOPHEN 325 MG/1
650 TABLET ORAL ONCE
Status: COMPLETED | OUTPATIENT
Start: 2021-01-22 | End: 2021-01-22

## 2021-01-22 RX ORDER — ACETAMINOPHEN 160 MG/5ML
650 SOLUTION ORAL ONCE
Status: COMPLETED | OUTPATIENT
Start: 2021-01-22 | End: 2021-01-22

## 2021-01-22 RX ADMIN — TAMSULOSIN HYDROCHLORIDE 0.4 MG: 0.4 CAPSULE ORAL at 08:47

## 2021-01-22 RX ADMIN — ZOLPIDEM TARTRATE 5 MG: 5 TABLET, FILM COATED ORAL at 20:50

## 2021-01-22 RX ADMIN — ACETAMINOPHEN 650 MG: 325 TABLET, FILM COATED ORAL at 18:04

## 2021-01-22 RX ADMIN — HEPARIN SODIUM 5000 UNITS: 5000 INJECTION INTRAVENOUS; SUBCUTANEOUS at 05:55

## 2021-01-22 RX ADMIN — GABAPENTIN 300 MG: 300 CAPSULE ORAL at 16:08

## 2021-01-22 RX ADMIN — HYDROCODONE BITARTRATE AND ACETAMINOPHEN 1 TABLET: 7.5; 325 TABLET ORAL at 21:27

## 2021-01-22 RX ADMIN — ACETAMINOPHEN 650 MG: 325 TABLET, FILM COATED ORAL at 13:42

## 2021-01-22 RX ADMIN — HEPARIN SODIUM 5000 UNITS: 5000 INJECTION INTRAVENOUS; SUBCUTANEOUS at 13:42

## 2021-01-22 RX ADMIN — FAMOTIDINE 20 MG: 20 TABLET, FILM COATED ORAL at 08:48

## 2021-01-22 RX ADMIN — HEPARIN SODIUM 5000 UNITS: 5000 INJECTION INTRAVENOUS; SUBCUTANEOUS at 20:50

## 2021-01-22 RX ADMIN — DEXAMETHASONE SODIUM PHOSPHATE 1 DROP: 1 SOLUTION/ DROPS OPHTHALMIC at 23:31

## 2021-01-22 RX ADMIN — DEXAMETHASONE SODIUM PHOSPHATE 1 DROP: 1 SOLUTION/ DROPS OPHTHALMIC at 00:09

## 2021-01-22 RX ADMIN — HYDROCODONE BITARTRATE AND ACETAMINOPHEN 1 TABLET: 7.5; 325 TABLET ORAL at 08:57

## 2021-01-22 RX ADMIN — TAMSULOSIN HYDROCHLORIDE 0.4 MG: 0.4 CAPSULE ORAL at 20:50

## 2021-01-22 RX ADMIN — HYDROMORPHONE HYDROCHLORIDE 0.5 MG: 1 INJECTION, SOLUTION INTRAMUSCULAR; INTRAVENOUS; SUBCUTANEOUS at 16:51

## 2021-01-22 RX ADMIN — ESCITALOPRAM OXALATE 10 MG: 10 TABLET ORAL at 08:48

## 2021-01-22 RX ADMIN — HYDROCODONE BITARTRATE AND ACETAMINOPHEN 1 TABLET: 7.5; 325 TABLET ORAL at 02:38

## 2021-01-22 RX ADMIN — HYDROCODONE BITARTRATE AND ACETAMINOPHEN 1 TABLET: 7.5; 325 TABLET ORAL at 16:08

## 2021-01-22 RX ADMIN — ACETAMINOPHEN 650 MG: 325 TABLET, FILM COATED ORAL at 10:16

## 2021-01-22 RX ADMIN — DEXAMETHASONE SODIUM PHOSPHATE 1 DROP: 1 SOLUTION/ DROPS OPHTHALMIC at 18:04

## 2021-01-22 RX ADMIN — DEXAMETHASONE SODIUM PHOSPHATE 1 DROP: 1 SOLUTION/ DROPS OPHTHALMIC at 12:35

## 2021-01-22 RX ADMIN — DEXAMETHASONE SODIUM PHOSPHATE 1 DROP: 1 SOLUTION/ DROPS OPHTHALMIC at 05:55

## 2021-01-22 RX ADMIN — Medication 5 MG: at 20:50

## 2021-01-22 RX ADMIN — FAMOTIDINE 20 MG: 20 TABLET, FILM COATED ORAL at 20:50

## 2021-01-22 RX ADMIN — GABAPENTIN 300 MG: 300 CAPSULE ORAL at 08:48

## 2021-01-22 RX ADMIN — ACETAMINOPHEN 650 MG: 325 TABLET, FILM COATED ORAL at 02:10

## 2021-01-22 NOTE — PROGRESS NOTES
Continued Stay Note  [facility]     Patient Name: Craig Smalls  MRN: [id number]  Today's Date: 1/22/2021    Admit Date: 1/19/2021    Discharge Plan     Row Name 01/22/21 0925       Plan    Plan  Home with HH with wife transporting    Plan Comments  SW met with patient and Wife at bedside to discuss discharge planning. Discussed HH services. Patients [facility] [phone] Fax [phone]. [facility] spoke with Justin at [facility]. SW faxed referral. Plans is Home with HH with wife Jerri [phone] transporting.        Discharge Codes    No documentation.             VIKTOR Breen (Kay)

## 2021-01-22 NOTE — PROGRESS NOTES
"[facility] ([facility]      A little less energy today earlier, feeling better now  Tolerating diet well      Vital Signs:  Blood pressure 109/67, pulse 80, temperature 97.9 °F (36.6 °C), temperature source Axillary, resp. rate 16, height 175.3 cm (69\"), weight 74.8 kg (165 lb), SpO2 97 %.    Labs past 24 hours:  Lab Results (last 24 hours)     Procedure Component Value Units Date/Time    Basic Metabolic Panel [[id number]]  (Abnormal) Collected: 01/22/21 0433    Specimen: Blood Updated: 01/22/21 0639     Glucose 102 mg/dL      BUN 6 mg/dL      Creatinine 0.57 mg/dL      Sodium 133 mmol/L      Potassium 4.0 mmol/L      Chloride 103 mmol/L      CO2 25.0 mmol/L      Calcium 7.9 mg/dL      eGFR Non African Amer 147 mL/min/1.73      BUN/Creatinine Ratio 10.5     Anion Gap 5.0 mmol/L     Narrative:      GFR Normal >60  Chronic Kidney Disease <60  Kidney Failure <15      CBC (No Diff) [[id number]]  (Abnormal) Collected: 01/22/21 0433    Specimen: Blood Updated: 01/22/21 0605     WBC 11.52 10*3/mm3      RBC 2.10 10*6/mm3      Hemoglobin 7.2 g/dL      Hematocrit 21.5 %      .4 fL      MCH 34.3 pg      MCHC 33.5 g/dL      RDW 11.5 %      RDW-SD 42.5 fl      MPV 10.3 fL      Platelets 176 10*3/mm3           I/O last shift:  I/O this shift:  In: 360 [P.O.:360]  Out: 1200 [Urine:200; Stool:1000]     PHYSICAL EXAM-  Comfortable  cv- rsr  Chest- clear, =  Abd- soft, mild distention, some old blood from the JANNET drain site.  Stoma with good function.    Pathology:  Order Name Source Comment Collection Info Order Time   ABO/RH SPECIMEN VERIFICATION PREOP   Collected By: Vane Rivera RN 1/19/2021  8:40 AM   TISSUE PATHOLOGY EXAM Spleen  Collected By: Silverio Montero MD 1/19/2021 12:51 PM   .    Assessment and Plan:  Anemia on postoperative day 2  Hematocrit on postoperative day 1 was 34  JANNET drain was without significant output.  Today, postoperative day 3, slightly progressive anemia from " postoperative day 2, transfusion.  Given the absence of JANNET output, history and findings suggest possible left rectus hematoma which could be reflected in future ecchymosis in this region.  Also possible to have some blood in the pouch, but no blood transanally or from the ileostomy.  Vital signs remained good  There is no increase in creatinine or laboratory findings to suggest prerenal status throughout this postoperative course.  If hematocrit looks good tomorrow, anticipate transition to home.  Discharge instructions reviewed.    Silverio Montero MD  01/22/21  17:55 EST

## 2021-01-22 NOTE — DISCHARGE PLACEMENT REQUEST
"Pending sale to [facility] Contact: Annia Cardenas [phone]  Craig Bajwa (55 y.o. Male)     Date of Birth Social Security Number Address Home Phone MRN    1965  [address]   [place] [address] [phone] [id number]    [employer] Marital Status          [employer]        Admission Date Admission Type Admitting Provider Attending Provider Department, Room/Bed    1/19/21 Elective Silverio Montero MD Belin, Bruce M, MD [facility] 5H, S580/1    Discharge Date Discharge Disposition Discharge Destination                       Attending Provider: Silverio Montero MD    Allergies: No Known Allergies    Isolation: None   Infection: None   Code Status: CPR    Ht: 175.3 cm (69\")   Wt: 74.8 kg (165 lb)    Admission Cmt: None   Principal Problem: S/P proctocolectomy( total mesorectal excision, ileal pouch, pouch anal anastomosis, protecting loop ileostomy, resection of accessory spleen)    [Z90.49] More...                 Active Insurance as of 1/19/2021     Primary Coverage     Payor Plan Insurance Group Employer/Plan Group    [facility] BLUE [facility] [facility] [employer] EMPLOYEE [id number]     Payor Plan Address Payor Plan Phone Number Payor Plan Fax Number Effective Dates    [address] [phone]  1/1/2019 - None Entered    [facility] [id number]       Subscriber Name Subscriber Birth Date Member ID       CRAIG BAJWA 1965 [id number]           Secondary Coverage     Payor Plan Insurance Group Employer/Plan Group    [facility] PLAN [facility] PLAN [facility] [place]     Payor Plan Address Payor Plan Phone Number Payor Plan Fax Number Effective Dates    [address]   1/1/2021 - None Entered    [facility] [phone]       Subscriber Name Subscriber Birth Date Member ID       CRAIG BAJWA 1965 [id number]                 Emergency Contacts      (Rel.) Home Phone Work Phone Mobile Phone    Jerri Bajwa (Spouse) [phone] -- [phone]          [facility] "   [address] [facility] [address]  Phone:  [phone]  Fax:  [phone] Date: 2021      Ambulatory Referral to Home Health     Patient:  Craig Smalls MRN:  [id number]   [address]   [place] [address] :  1965  SSN:    Phone: [phone] Sex:  M      INSURANCE PAYOR PLAN GROUP # SUBSCRIBER ID   Primary:  Secondary:    [facility] COMMUNITY PLAN [facility] [id number]  [id number]  [facility] [id number]  [id number]      Referring Provider Information:  TYLER SOLANO Phone: [phone] Fax: [phone]      Referral Information:   # Visits:  1 Referral Type: Home Health [42]   Urgency:  Routine Referral Reason: Specialty Services Required   Start Date: 2021 End Date:  To be determined by Insurer   Diagnosis: Chronic ulcerative colitis (CMS/HCC) (K51.90 [ICD-10-CM] 556.9 [ICD-9-CM])  History of ulcerative colitis (Z87.19 [ICD-10-CM] V12.79 [ICD-9-CM])  S/P proctocolectomy (Z90.49 [ICD-10-CM] V45.79,V45.72 [ICD-9-CM])  Acute blood loss anemia (D62 [ICD-10-CM] 285.1 [ICD-9-CM])  Hyperkalemia (E87.5 [ICD-10-CM] 276.7 [ICD-9-CM])      Refer to Dept:   Refer to Provider:   Refer to Facility:       Face to Face Visit Date: 2021  Follow-up provider for Plan of Care? I treated the patient in an acute care facility and will not continue treatment after discharge.  Follow-up provider: KADY SMITH [[id number]]  Reason/Clinical Findings: sp hospital stay  Describe mobility limitations that make leaving home difficult: post op, ostomy  Nursing/Therapeutic Services Requested: Skilled Nursing  Skilled nursing orders: Ostomy instruction  Skilled nursing orders: Medication education  Frequency: 1 Week 1     This document serves as a request of services and does not constitute Insurance authorization or approval of services.  To determine eligibility, please contact the members Insurance carrier to verify and review coverage.     If you have medical questions  regarding this request for services. Please contact [address] at [phone] during normal business hours.       Verbal Order Mode: Telephone with readback   Authorizing Provider: Zane Gonzales MD  Authorizing Provider's NPI: [id number]     Order Entered By: Miranda Alfonso RN 2021  1:31 PM                 History & Physical      Zane Gonzales MD at 21 1820          Admission HP     Patient Name: Craig Smalls  MRN: [id number]  : 1965  DOS: 2021    Attending: Silverio Montero MD    Primary Care Provider: Albert Delgado MD      Patient Care Team:  Albert Delgado MD as PCP - General (Family Medicine)    Chief complaint: Ulcerative colitis    Subjective   Patient is a pleasant 55 y.o. male presented for scheduled surgery by Dr. Silverio Montero  Patient with history of ulcerative colitis for about 7 years.  He has been on biologic therapy Entyvio.  Preop evaluation with CT scan showed thickening of the colon.  Today he underwent extensive procedures listed below under general anesthesia, tolerated surgery well, was admitted for further management.    When seen in his room after surgery, he is doing well, expected postoperative pain.  Already ambulated.  No nausea or vomiting.  No shortness of breath.    Allergies:  No Known Allergies    Meds:  Medications Prior to Admission   Medication Sig Dispense Refill Last Dose   • Artificial Tear Ointment (DRY EYES OP) Apply  to eye(s) as directed by provider Daily As Needed (dry eyes).   2021 at 1330   • escitalopram (LEXAPRO) 10 MG tablet Take 10 mg by mouth Daily.   2021 at 0830   • Melatonin 10 MG capsule Take 10 mg by mouth Every Night.   2021 at 2300   • multivitamin with minerals (CENTRUM SILVER PO) Take 1 tablet by mouth Daily.   2021 at 0830   • prednisoLONE Sodium Phosphate (PREDNISOL OP) Apply 1 drop to eye(s) as directed by provider 4 (Four) Times a Day.   2021 at 0630   • tamsulosin  "(FLOMAX) 0.4 MG capsule 24 hr capsule Take 1 capsule by mouth 2 (two) times a day.   2021 at 0830   • Budesonide (ENTOCORT EC) 3 MG 24 hr capsule Take 9 mg by mouth Every Morning.   2021   • simethicone (MYLICON) 125 MG chewable tablet Chew 125 mg Every 6 (Six) Hours As Needed for Flatulence.   2021   • vedolizumab (Entyvio) 300 MG injection Infuse 300 mg into a venous catheter Every 28 (Twenty-Eight) Days.   2020         History:   Past Medical History:   Diagnosis Date   • Enlarged prostate    • Ulcerative colitis, chronic (CMS/HCC)    • Wears glasses     Reading    • Wears hearing aid     non-compliant at times   • Wears partial dentures     UPPER PARTIAL   History of uveitis    Past Surgical History:   Procedure Laterality Date   • COLONOSCOPY       History reviewed. No pertinent family history.  Social History     Tobacco Use   • Smoking status: Former Smoker     Types: Cigarettes     Quit date: 1997     Years since quittin.0   • Smokeless tobacco: Never Used   • Tobacco comment: \"I smoked a little when I drank\"    Substance Use Topics   • Alcohol use: Not Currently   • Drug use: Never   , retired.(Was a teacher/principal/truancy officer)    Review of Systems  Pertinent items are noted in HPI    Vital Signs  /89   Pulse 65   Temp 97.2 °F (36.2 °C) (Temporal)   Resp 16   Ht 175.3 cm (69\")   Wt 74.8 kg (165 lb)   SpO2 100%   BMI 24.37 kg/m²     Physical Exam:    General Appearance:    Alert, cooperative, in no acute distress   Head:    Normocephalic, without obvious abnormality, atraumatic   Eyes:            Lids and lashes normal, conjunctivae and sclerae normal, no   icterus, no pallor, corneas clear   Ears:    Ears appear intact with no abnormalities noted   Throat:   No oral lesions, no thrush, oral mucosa moist   Neck:   No adenopathy, supple, trachea midline, no thyromegaly         Lungs:     Clear to auscultation,respirations regular, even and       " unlabored. No wheezes or rales.    Heart:    Regular rhythm and normal rate, normal S1 and S2, no murmur    Abdomen:      Soft, expected tenderness.  Serous drainage on midline incision dressing.  Left JANNET drain with serosanguineous drainage in bulb.  Stoma on the right is pink with a bridge.   Genitalia:    Deferred  Harrison with dark urine.   Extremities:   Moves all extremities well, no edema, no cyanosis, no              redness   Pulses:   Pulses palpable and equal bilaterally   Skin:   No bleeding, bruising or rash   Neurologic:   Cranial nerves 2 - 12 grossly intact, no gross motor deficit.     Results from last 7 days   Lab Units 01/18/21  1325   WBC 10*3/mm3 7.31   HEMOGLOBIN g/dL 14.1   HEMATOCRIT % 42.5   PLATELETS 10*3/mm3 245     Results from last 7 days   Lab Units 01/18/21  1325   SODIUM mmol/L 140   POTASSIUM mmol/L 4.1   CHLORIDE mmol/L 105   CO2 mmol/L 25.0   BUN mg/dL 13   CREATININE mg/dL 0.76   CALCIUM mg/dL 9.2   BILIRUBIN mg/dL 0.6   ALK PHOS U/L 74   ALT (SGPT) U/L 13   AST (SGOT) U/L 17   GLUCOSE mg/dL 75     Lab Results   Component Value Date    HGBA1C 5.30 01/18/2021       Assessment and Plan:       S/P proctocolectomy( total mesorectal excision, ileal pouch, pouch anal anastomosis, protecting loop ileostomy, resection of accessory spleen)       Chronic ulcerative colitis (CMS/HCC)      Plan  1. Ambulation, several times daily.  2. Pain control-prns   3. IS-encourage  4. DVT proph- Mechanical, subcutaneous heparin  5. Bowel regimen  6. Resume home medications as appropriate  7. Monitor post-op labs  8. DC planning   9. Diet, Clears, advance diet as tolerated.  Watch for bowel function/stoma output.  IVF initially, monitor volume status.    -New stoma:  Wound care stoma nurse consult for stoma care and education throughout patient's hospitalization.    Discussed with patient postop management plan, expressed understanding and agreement.      Dragon disclaimer:  Part of this encounter note is  an electronic transcription/translation of spoken language to printed text. The electronic translation of spoken language may permit erroneous, or at times, nonsensical words or phrases to be inadvertently transcribed; Although I have reviewed the note for such errors, some may still exist.    Zane Gonzales MD  01/19/21  18:20 EST              Electronically signed by Zane Gonzales MD at 01/19/21 2346     Jessica Patino APRN at 01/19/21 0916     Attestation signed by Silverio Montero MD at 01/19/21 1444    No interval change.                    Pre-Op H&P  Craig Smalls  [id number]  1965      Chief complaint: Ulcerative colitis      Subjective:  Patient is a 55 y.o.male presents for scheduled surgery by Dr. Montero. He anticipates a COLECTOMY AND ILEOSTOMY, POSSIBLE PROCTOCOLECTOMY POUCH, LOOP today. He has hx of ulcerative colitis. Last colonoscopy 11/10/20. Pt reports he was found to have dysplagia. He has chronic diarrhea.       Review of Systems:  Constitutional-- No fever, chills or sweats. No fatigue.  CV-- No chest pain, palpitation or syncope  Resp-- No SOB, cough, hemoptysis  Skin--No rashes or lesions      Allergies: No Known Allergies      Home Meds:  Medications Prior to Admission   Medication Sig Dispense Refill Last Dose   • Artificial Tear Ointment (DRY EYES OP) Apply  to eye(s) as directed by provider Daily As Needed (dry eyes).   1/18/2021 at 1330   • escitalopram (LEXAPRO) 10 MG tablet Take 10 mg by mouth Daily.   1/18/2021 at 0830   • Melatonin 10 MG capsule Take 10 mg by mouth Every Night.   1/18/2021 at 2300   • multivitamin with minerals (CENTRUM SILVER PO) Take 1 tablet by mouth Daily.   1/18/2021 at 0830   • prednisoLONE Sodium Phosphate (PREDNISOL OP) Apply 1 drop to eye(s) as directed by provider 4 (Four) Times a Day.   1/19/2021 at 0630   • tamsulosin (FLOMAX) 0.4 MG capsule 24 hr capsule Take 1 capsule by mouth 2 (two) times a day.   1/18/2021 at 0830   •  "Budesonide (ENTOCORT EC) 3 MG 24 hr capsule Take 9 mg by mouth Every Morning.   2021   • simethicone (MYLICON) 125 MG chewable tablet Chew 125 mg Every 6 (Six) Hours As Needed for Flatulence.   2021   • vedolizumab (Entyvio) 300 MG injection Infuse 300 mg into a venous catheter Every 28 (Twenty-Eight) Days.   2020         PMH:   Past Medical History:   Diagnosis Date   • Enlarged prostate    • Ulcerative colitis, chronic (CMS/HCC)    • Wears glasses     Reading    • Wears hearing aid     non-compliant at times   • Wears partial dentures     UPPER PARTIAL     PSH:    Past Surgical History:   Procedure Laterality Date   • COLONOSCOPY         Immunization History:  Influenza:   Pneumococcal: Unknown  Tetanus: UTD      Social History:   Tobacco:   Social History     Tobacco Use   Smoking Status Former Smoker   • Types: Cigarettes   • Quit date: 1997   • Years since quittin.0   Smokeless Tobacco Never Used   Tobacco Comment    \"I smoked a little when I drank\"       Alcohol:     Social History     Substance and Sexual Activity   Alcohol Use Not Currently         Physical Exam:/87 (BP Location: Right arm, Patient Position: Lying)   Pulse 64   Temp 97.9 °F (36.6 °C) (Temporal)   Resp 16   Ht 175.3 cm (69\")   Wt 74.8 kg (165 lb)   SpO2 100%   BMI 24.37 kg/m²       General Appearance:    Alert, cooperative, no distress, appears stated age   Head:    Normocephalic, without obvious abnormality, atraumatic   Lungs:     Clear to auscultation bilaterally, respirations unlabored    Heart:   Regular rate and rhythm, S1 and S2 normal    Abdomen:    Soft without tenderness   Breast Exam:    deferred   Genitalia:    deferred   Extremities:   Extremities normal, atraumatic, no cyanosis or edema   Skin:   Skin color, texture, turgor normal, no rashes or lesions   Neurologic:   Grossly intact     Results Review:     LABS:  Lab Results   Component Value Date    WBC 7.31 2021    HGB 14.1 " 01/18/2021    HCT 42.5 01/18/2021    .7 (H) 01/18/2021     01/18/2021    GLUCOSE 75 01/18/2021    BUN 13 01/18/2021    CREATININE 0.76 01/18/2021    EGFRIFNONA 106 01/18/2021    EGFRIFAFRI >60 07/30/2018     01/18/2021    K 4.1 01/18/2021     01/18/2021    CO2 25.0 01/18/2021    CALCIUM 9.2 01/18/2021    ALBUMIN 4.30 01/18/2021    AST 17 01/18/2021    ALT 13 01/18/2021    BILITOT 0.6 01/18/2021       RADIOLOGY:  CT abd 12/11/20:  Study Result    EXAMINATION: CT ABDOMEN PELVIS W CONTRAST- 12/11/2020      INDICATION: K51.90-Ulcerative colitis, unspecified, without  complications; abdominal pain     TECHNIQUE: Multiple axial CT imaging was obtained of the abdomen and  pelvis following the administration of oral and intravenous contrast.     The radiation dose reduction device was turned on for each scan per the  ALARA (As Low as Reasonably Achievable) protocol.     COMPARISON: NONE     FINDINGS: ABDOMEN: The lung bases are grossly clear with some chronic  changes identified lung bases bilaterally. The liver is homogeneous in  appearance. The spleen is unremarkable. No stones in the gallbladder.  The kidneys and adrenal glands within normal limits. The pancreas is  homogeneous. The abdominal portion of the gastrointestinal tract within  normal limits. No free fluid or free air. No abnormal mass or fluid  collections identified.     PELVIS: There is some mild wall thickening and stranding seen  surrounding the descending colon. There is some mild wall thickening  identified of the splenic flexure. The remainder of the colon is  unremarkable. Findings likely representing patient's known colitis.  There is no pelvic adenopathy. No significant stranding seen surrounding  the sigmoid colon. There is no abnormal mass or fluid collection.  Degenerative changes seen within the spine and pelvis.     Delayed imaging reveals contrast seen in the renal collecting systems  bilaterally as well as within the  ureters and bladder with no evidence  of obstruction.     IMPRESSION:  Abnormal wall thickening seen in the splenic flexure and  descending colon likely suggesting patient's known colitis. There is no  significant stranding seen surrounding the sigmoid colon or rectum. The  remainder of the abdomen and pelvis is grossly unremarkable with some  minimal chronic changes seen at the lung bases.       I reviewed the patient's new clinical results.    Cancer Staging (if applicable)  Cancer Patient: __ yes __no __unknown; If yes, clinical stage T:__ N:__M:__, stage group or __N/A      Impression: Ulcerative colitis       Plan: COLECTOMY AND ILEOSTOMY, POSSIBLE PROCTOCOLECTOMY POUCH, LOOP      Jessica Patino, JINA   1/19/2021   09:16 EST    Electronically signed by Silverio Montero MD at 01/19/21 4591       {Outbreak/Travel/Exposure Documentation......;  Question Available Choices Patient Response   Outbreak Screen: Do you currently have a new onset of the following symptoms?        Fever/Chils, Cough, Shortness of air, Loss of taste or smell, No, Unknown  No (01/19/21 0841)   Outbreak Screen: In the last 14 days, have you had contact with anyone who is ill, has show any of the symptoms listed above and/or has been diagnosis with the 2019 Novel Coronavirus? This includes any immediate household members but excludes any patients with whom you have been in contact within your normal work duties wearing proper PPE, if you are a healthcare worker.  Yes, No, Unknown              No (01/19/21 0841)   Outbreak Screen: Who was notified?    Free text  (not recorded)   Travel Screen: Have you traveled in the last month? If so, to what country have you traveled? If [place] what state? Yes, No, Unknown  List of all countries  List of all States No (01/19/21 0841)  (not recorded)  (not recorded)   Infection Risk: Do you currently have the following symptoms?  (If cough is selected, the Tuberculosis Screen is performed.) Cough, Fever, Rash,  No No (01/19/21 0841)   Tuberculosis Screen: Do you have any of the following Tuberculosis Risks?  · Have you lived or spent time with anyone who had or may have TB?  · Have you lived in or visited any of the following areas for more than one month: [place], [place], [place], [place] or [place], the [place] or [place]?  · Do you have HIV/AIDS?  · Have you lived in or worked in a nursing home, homeless shelter, correctional facility, or substance abuse treatment facility?   · No    If Yes do you have any of the following symptoms? Yes responses display to the right    If Yes, symptoms listed are:  Cough greater than or equal to 3 weeks, Loss of appetite, Unexplained weight loss, Night sweats, Bloody sputum or hemoptysis, Hoarseness, Fever, Fatigue, Chest pain, No (not recorded)  (not recorded)   Exposure Screen: Have you been exposed to any of these contagious diseases in the last month? Measles, Chickenpox, Meningitis, Pertussis, Whooping Cough, No No (01/19/21 0841)         Current Facility-Administered Medications   Medication Dose Route Frequency Provider Last Rate Last Admin   • acetaminophen (TYLENOL) tablet 650 mg  650 mg Oral Once Zane Gonzales MD        Or   • acetaminophen (TYLENOL) 160 MG/5ML solution 650 mg  650 mg Oral Once Zane Gonzales MD        Or   • acetaminophen (TYLENOL) suppository 650 mg  650 mg Rectal Once Zane Gonzales MD       • acetaminophen (TYLENOL) tablet 650 mg  650 mg Oral Q8H Silverio Montero MD   650 mg at 01/22/21 1016   • dexamethasone (DECADRON) 0.1 % ophthalmic solution 1 drop  1 drop Both Eyes Q6H Zane Gonzales MD   1 drop at 01/22/21 1235   • diazePAM (VALIUM) tablet 5 mg  5 mg Oral Q6H PRN Silverio Montero MD       • escitalopram (LEXAPRO) tablet 10 mg  10 mg Oral Daily Zane Gonzales MD   10 mg at 01/22/21 0848   • famotidine (PEPCID) tablet 20 mg  20 mg Oral Q12H Clayton Clemente, PharmD   20 mg at 01/22/21 0848   • ferric subsulfate  (MONSEL'S) solution solution   Topical PRN Silverio Montero MD       • gabapentin (NEURONTIN) capsule 300 mg  300 mg Oral TID Silverio Montero MD   300 mg at 01/22/21 0848   • heparin (porcine) 5000 UNIT/ML injection 5,000 Units  5,000 Units Subcutaneous Q8H Zane Gonzales MD   5,000 Units at 01/22/21 0555   • HYDROcodone-acetaminophen (NORCO) 7.5-325 MG per tablet 1 tablet  1 tablet Oral Q4H PRN Silverio Montero MD   1 tablet at 01/22/21 0857   • HYDROmorphone (DILAUDID) injection 0.5 mg  0.5 mg Intravenous Q2H PRN Silverio Montero MD   0.5 mg at 01/20/21 0311    And   • naloxone (NARCAN) injection 0.4 mg  0.4 mg Intravenous Q5 Min PRN Silverio Montero MD       • labetalol (NORMODYNE,TRANDATE) injection 10 mg  10 mg Intravenous Q4H PRN Zane Gonzales MD       • lactated ringers infusion  9 mL/hr Intravenous Continuous Toby Chilel MD 9 mL/hr at 01/19/21 0855 New Bag at 01/19/21 1400   • lactated ringers infusion  100 mL/hr Intravenous Continuous Rishabh Galo CRNA       • melatonin tablet 5 mg  5 mg Oral Nightly Zane Gonzales MD   5 mg at 01/21/21 2112   • ondansetron (ZOFRAN) tablet 4 mg  4 mg Oral Q6H PRN Silverio Montero MD   4 mg at 01/20/21 0350    Or   • ondansetron (ZOFRAN) injection 4 mg  4 mg Intravenous Q6H PRN Silverio Montero MD       • polyvinyl alcohol (LIQUIFILM) 1.4 % ophthalmic solution 2 drop  2 drop Both Eyes Q3H PRN Zane Gonzales MD       • sodium chloride 0.9 % bolus 500 mL  500 mL Intravenous TID PRN Zane Gonzales MD 1,000 mL/hr at 01/20/21 1300 500 mL at 01/20/21 1300   • sodium chloride 0.9 % infusion  100 mL/hr Intravenous Continuous Zane Gonzales  mL/hr at 01/21/21 2143 100 mL/hr at 01/21/21 2143   • tamsulosin (FLOMAX) 24 hr capsule 0.4 mg  0.4 mg Oral BID Zane Gonzales MD   0.4 mg at 01/22/21 0847   • zolpidem (AMBIEN) tablet 5 mg  5 mg Oral Nightly PRN Silverio Montero MD   5 mg at 01/21/21 2112        Physician Progress Notes (most  "recent note)      Zane Gonzales MD at 21 1204          IM progress note      Craig Smalls  [id number]  1965     LOS: 3 days     Attending: Silverio Montero MD    Primary Care Provider: Albert Delgado MD      Chief Complaint/Reason for visit: Ulcerative colitis    Subjective   Doing fairly well.  Fair pain control. Tolerating po . Still with high output in stoma. Not voided since Harrison removal yet. .     Objective          Visit Vitals  /68   Pulse 94   Temp 98 °F (36.7 °C)   Resp 14   Ht 175.3 cm (69\")   Wt 74.8 kg (165 lb)   SpO2 97%   BMI 24.37 kg/m²     Temp (24hrs), Av.8 °F (37.1 °C), Min:98 °F (36.7 °C), Max:99.6 °F (37.6 °C)      Nutrition: Clear liquids    Respiratory: Room air    Physical Exam:     General Appearance:    Alert, cooperative, in no acute distress   Head:    Normocephalic, without obvious abnormality, atraumatic    Lungs:     Normal effort, symmetric chest rise, no crepitus, clear to      auscultation bilaterally             Heart:    Regular rhythm and normal rate, normal S1 and S2   Abdomen:     Soft, expected tenderness.  Serous drainage on midline incision dressing.  Left JANNET drain with serosanguineous drainage in bulb.  Stoma on the right is pink , seen during stoma education session, 21     Extremities:   No clubbing, cyanosis or edema.  No deformities.    Pulses:   Pulses palpable and equal bilaterally   Skin:   No bleeding, bruising or rash         Results Review:     I reviewed the patient's new clinical results.   Results from last 7 days   Lab Units 21  0433 21  0448 21  0416   WBC 10*3/mm3 11.52* 13.25* 14.15*   HEMOGLOBIN g/dL 7.2* 8.0* 11.2*   HEMATOCRIT % 21.5* 23.5* 34.5*   PLATELETS 10*3/mm3 176 180 249     Results from last 7 days   Lab Units 21  0433 21  0448 21  0416 21  1325   SODIUM mmol/L 133* 134* 134* 140   POTASSIUM mmol/L 4.0 4.3 5.8* 4.1   CHLORIDE mmol/L 103 102 103 105   CO2 mmol/L 25.0 " 26.0 24.0 25.0   BUN mg/dL 6 9 11 13   CREATININE mg/dL 0.57* 0.63* 0.80 0.76   CALCIUM mg/dL 7.9* 8.2* 8.5* 9.2   BILIRUBIN mg/dL  --   --   --  0.6   ALK PHOS U/L  --   --   --  74   ALT (SGPT) U/L  --   --   --  13   AST (SGOT) U/L  --   --   --  17   GLUCOSE mg/dL 102* 101* 178* 75     I reviewed the patient's new imaging including images and reports.    All medications reviewed.   acetaminophen, 650 mg, Oral, Once    Or  acetaminophen, 650 mg, Oral, Once    Or  acetaminophen, 650 mg, Rectal, Once  acetaminophen, 650 mg, Oral, Q8H  dexamethasone, 1 drop, Both Eyes, Q6H  escitalopram, 10 mg, Oral, Daily  famotidine, 20 mg, Oral, Q12H  gabapentin, 300 mg, Oral, TID  heparin (porcine), 5,000 Units, Subcutaneous, Q8H  melatonin, 5 mg, Oral, Nightly  tamsulosin, 0.4 mg, Oral, BID      Assessment/Plan     S/P proctocolectomy( total mesorectal excision, ileal pouch, pouch anal anastomosis, protecting loop ileostomy, resection of accessory spleen)       Chronic ulcerative colitis (CMS/HCC)    Leukocytosis, likely reactive    Acute blood loss anemia    Hyperkalemia, likely related to IVF/ treated.      Plan  PRBCs ordered 1/22/21.  Await voiding.  Watch stoma output.    1. Ambulation, encouraged  2. Pain control-prns   3. IS-encouraged  4. DVT proph- mechs/heparin.    5. Bowel regimen     Soft diet as tolerated.      -New stoma:  Wound care stoma nurse consult for stoma care and education throughout patient's hospitalization.    Dicussed with pt and RN.    Zane Gonzales MD  01/22/21  12:04 EST    Electronically signed by Zane Gonzales MD at 01/22/21 1209       Consult Notes (most recent note)    No notes of this type exist for this encounter.

## 2021-01-22 NOTE — PLAN OF CARE
Goal Outcome Evaluation:  Plan of Care Reviewed With: patient  Progress: improving  Outcome Summary: VSS. Patient walked in hallway this shift. ileostomy bag was changed 2x this shift as it was leaking. JANNET Drain and wells will be discontinued this AM. Patient slept intermittengly.

## 2021-01-22 NOTE — NURSING NOTE
"wocn follow up for discharge care of loop ileostomy.    Patient emptied pouch into toilet, standing ( >900mL)     Patient removed old pouch with adhesive remover. wocn removed bridge with no complications. Patient measured stoma at 38mm. Slight MC separation at 3 oclock. Patient cut and applied wafer and snapped on pouch with no complications. Diet, pouch changes, showering, follow up care and electrolyte replacement/ hydration reviewed. patient and wife verbalized understanding.     Discharge supplies given. Planned dc home with home health tomorrow (sat. 1/23)     wocn will continue to follow. Please contact with questions or concerns.     Supplies used: [place] drainable pouch [id number] holister  2 3/4\"   Convex wafer [id number]  "

## 2021-01-22 NOTE — PROGRESS NOTES
"IM progress note      Craig Smalls  [id number]  1965     LOS: 3 days     Attending: Silverio Montero MD    Primary Care Provider: Albert Delgado MD      Chief Complaint/Reason for visit: Ulcerative colitis    Subjective   Doing fairly well.  Fair pain control. Tolerating po . Still with high output in stoma. Not voided since Harrison removal yet. .     Objective          Visit Vitals  /68   Pulse 94   Temp 98 °F (36.7 °C)   Resp 14   Ht 175.3 cm (69\")   Wt 74.8 kg (165 lb)   SpO2 97%   BMI 24.37 kg/m²     Temp (24hrs), Av.8 °F (37.1 °C), Min:98 °F (36.7 °C), Max:99.6 °F (37.6 °C)      Nutrition: Clear liquids    Respiratory: Room air    Physical Exam:     General Appearance:    Alert, cooperative, in no acute distress   Head:    Normocephalic, without obvious abnormality, atraumatic    Lungs:     Normal effort, symmetric chest rise, no crepitus, clear to      auscultation bilaterally             Heart:    Regular rhythm and normal rate, normal S1 and S2   Abdomen:     Soft, expected tenderness.  Serous drainage on midline incision dressing.  Left JANNET drain with serosanguineous drainage in bulb.  Stoma on the right is pink , seen during stoma education session, 21     Extremities:   No clubbing, cyanosis or edema.  No deformities.    Pulses:   Pulses palpable and equal bilaterally   Skin:   No bleeding, bruising or rash         Results Review:     I reviewed the patient's new clinical results.   Results from last 7 days   Lab Units 21  0433 01/21/21  0448 01/20/21  0416   WBC 10*3/mm3 11.52* 13.25* 14.15*   HEMOGLOBIN g/dL 7.2* 8.0* 11.2*   HEMATOCRIT % 21.5* 23.5* 34.5*   PLATELETS 10*3/mm3 176 180 249     Results from last 7 days   Lab Units 21  0433 218 21  0416 21  1325   SODIUM mmol/L 133* 134* 134* 140   POTASSIUM mmol/L 4.0 4.3 5.8* 4.1   CHLORIDE mmol/L 103 102 103 105   CO2 mmol/L 25.0 26.0 24.0 25.0   BUN mg/dL 6 9 11 13   CREATININE mg/dL 0.57* 0.63* " 0.80 0.76   CALCIUM mg/dL 7.9* 8.2* 8.5* 9.2   BILIRUBIN mg/dL  --   --   --  0.6   ALK PHOS U/L  --   --   --  74   ALT (SGPT) U/L  --   --   --  13   AST (SGOT) U/L  --   --   --  17   GLUCOSE mg/dL 102* 101* 178* 75     I reviewed the patient's new imaging including images and reports.    All medications reviewed.   acetaminophen, 650 mg, Oral, Once    Or  acetaminophen, 650 mg, Oral, Once    Or  acetaminophen, 650 mg, Rectal, Once  acetaminophen, 650 mg, Oral, Q8H  dexamethasone, 1 drop, Both Eyes, Q6H  escitalopram, 10 mg, Oral, Daily  famotidine, 20 mg, Oral, Q12H  gabapentin, 300 mg, Oral, TID  heparin (porcine), 5,000 Units, Subcutaneous, Q8H  melatonin, 5 mg, Oral, Nightly  tamsulosin, 0.4 mg, Oral, BID      Assessment/Plan     S/P proctocolectomy( total mesorectal excision, ileal pouch, pouch anal anastomosis, protecting loop ileostomy, resection of accessory spleen)       Chronic ulcerative colitis (CMS/HCC)    Leukocytosis, likely reactive    Acute blood loss anemia    Hyperkalemia, likely related to IVF/ treated.      Plan  PRBCs ordered 1/22/21.  Await voiding.  Watch stoma output.    1. Ambulation, encouraged  2. Pain control-prns   3. IS-encouraged  4. DVT proph- mechs/heparin.    5. Bowel regimen     Soft diet as tolerated.      -New stoma:  Wound care stoma nurse consult for stoma care and education throughout patient's hospitalization.    Dicussed with pt and RN.    Zane Gonzales MD  01/22/21  12:04 EST

## 2021-01-23 VITALS
BODY MASS INDEX: 24.44 KG/M2 | OXYGEN SATURATION: 95 % | WEIGHT: 165 LBS | RESPIRATION RATE: 18 BRPM | DIASTOLIC BLOOD PRESSURE: 70 MMHG | SYSTOLIC BLOOD PRESSURE: 105 MMHG | HEART RATE: 94 BPM | HEIGHT: 69 IN | TEMPERATURE: 98.5 F

## 2021-01-23 LAB
BASOPHILS # BLD AUTO: 0.02 10*3/MM3 (ref 0–0.2)
BASOPHILS NFR BLD AUTO: 0.2 % (ref 0–1.5)
BH BB BLOOD EXPIRATION DATE: NORMAL
BH BB BLOOD EXPIRATION DATE: NORMAL
BH BB BLOOD TYPE BARCODE: 5100
BH BB BLOOD TYPE BARCODE: 5100
BH BB DISPENSE STATUS: NORMAL
BH BB DISPENSE STATUS: NORMAL
BH BB PRODUCT CODE: NORMAL
BH BB PRODUCT CODE: NORMAL
BH BB UNIT NUMBER: NORMAL
BH BB UNIT NUMBER: NORMAL
CROSSMATCH INTERPRETATION: NORMAL
CROSSMATCH INTERPRETATION: NORMAL
DEPRECATED RDW RBC AUTO: 49.7 FL (ref 37–54)
EOSINOPHIL # BLD AUTO: 0.5 10*3/MM3 (ref 0–0.4)
EOSINOPHIL NFR BLD AUTO: 4.8 % (ref 0.3–6.2)
ERYTHROCYTE [DISTWIDTH] IN BLOOD BY AUTOMATED COUNT: 13.6 % (ref 12.3–15.4)
HCT VFR BLD AUTO: 27.6 % (ref 37.5–51)
HGB BLD-MCNC: 9.2 G/DL (ref 13–17.7)
IMM GRANULOCYTES # BLD AUTO: 0.04 10*3/MM3 (ref 0–0.05)
IMM GRANULOCYTES NFR BLD AUTO: 0.4 % (ref 0–0.5)
LYMPHOCYTES # BLD AUTO: 1.47 10*3/MM3 (ref 0.7–3.1)
LYMPHOCYTES NFR BLD AUTO: 14 % (ref 19.6–45.3)
MCH RBC QN AUTO: 32.9 PG (ref 26.6–33)
MCHC RBC AUTO-ENTMCNC: 33.3 G/DL (ref 31.5–35.7)
MCV RBC AUTO: 98.6 FL (ref 79–97)
MONOCYTES # BLD AUTO: 0.53 10*3/MM3 (ref 0.1–0.9)
MONOCYTES NFR BLD AUTO: 5 % (ref 5–12)
NEUTROPHILS NFR BLD AUTO: 7.96 10*3/MM3 (ref 1.7–7)
NEUTROPHILS NFR BLD AUTO: 75.6 % (ref 42.7–76)
NRBC BLD AUTO-RTO: 0 /100 WBC (ref 0–0.2)
PLATELET # BLD AUTO: 226 10*3/MM3 (ref 140–450)
PMV BLD AUTO: 10.1 FL (ref 6–12)
RBC # BLD AUTO: 2.8 10*6/MM3 (ref 4.14–5.8)
UNIT  ABO: NORMAL
UNIT  ABO: NORMAL
UNIT  RH: NORMAL
UNIT  RH: NORMAL
WBC # BLD AUTO: 10.52 10*3/MM3 (ref 3.4–10.8)

## 2021-01-23 PROCEDURE — 85025 COMPLETE CBC W/AUTO DIFF WBC: CPT | Performed by: COLON & RECTAL SURGERY

## 2021-01-23 PROCEDURE — 25010000002 HEPARIN (PORCINE) PER 1000 UNITS: Performed by: INTERNAL MEDICINE

## 2021-01-23 RX ORDER — HYDROCODONE BITARTRATE AND ACETAMINOPHEN 7.5; 325 MG/1; MG/1
1 TABLET ORAL EVERY 6 HOURS PRN
Qty: 70 TABLET | Refills: 0
Start: 2021-01-19 | End: 2021-12-14

## 2021-01-23 RX ADMIN — FAMOTIDINE 20 MG: 20 TABLET, FILM COATED ORAL at 08:28

## 2021-01-23 RX ADMIN — HYDROCODONE BITARTRATE AND ACETAMINOPHEN 1 TABLET: 7.5; 325 TABLET ORAL at 01:41

## 2021-01-23 RX ADMIN — SODIUM CHLORIDE 100 ML/HR: 9 INJECTION, SOLUTION INTRAVENOUS at 08:29

## 2021-01-23 RX ADMIN — DIAZEPAM 5 MG: 5 TABLET ORAL at 08:28

## 2021-01-23 RX ADMIN — ACETAMINOPHEN 650 MG: 325 TABLET, FILM COATED ORAL at 01:40

## 2021-01-23 RX ADMIN — HYDROCODONE BITARTRATE AND ACETAMINOPHEN 1 TABLET: 7.5; 325 TABLET ORAL at 05:44

## 2021-01-23 RX ADMIN — HYDROCODONE BITARTRATE AND ACETAMINOPHEN 1 TABLET: 7.5; 325 TABLET ORAL at 10:25

## 2021-01-23 RX ADMIN — DIAZEPAM 5 MG: 5 TABLET ORAL at 01:41

## 2021-01-23 RX ADMIN — DIAZEPAM 5 MG: 5 TABLET ORAL at 14:19

## 2021-01-23 RX ADMIN — HEPARIN SODIUM 5000 UNITS: 5000 INJECTION INTRAVENOUS; SUBCUTANEOUS at 05:43

## 2021-01-23 RX ADMIN — DEXAMETHASONE SODIUM PHOSPHATE 1 DROP: 1 SOLUTION/ DROPS OPHTHALMIC at 05:44

## 2021-01-23 RX ADMIN — TAMSULOSIN HYDROCHLORIDE 0.4 MG: 0.4 CAPSULE ORAL at 08:28

## 2021-01-23 RX ADMIN — ESCITALOPRAM OXALATE 10 MG: 10 TABLET ORAL at 08:28

## 2021-01-23 NOTE — PROGRESS NOTES
Continued Stay Note  [facility]     Patient Name: Craig Smalls  MRN: [id number]  Today's Date: 1/23/2021    Admit Date: 1/19/2021    Discharge Plan     Row Name 01/23/21 9059       Plan    Plan  Home with Home Care    Patient/Family in Agreement with Plan  yes    Plan Comments  Called and spoke with [facility] to verify they accepted the referral for HH and they have. Case mgmt will fax the DC summary to [phone].    Final Discharge Disposition Code  06 - home with home health care        Discharge Codes    No documentation.       Expected Discharge Date and Time     Expected Discharge Date Expected Discharge Time    Jan 23, 2021             Peggy Sanchez RN

## 2021-01-23 NOTE — DISCHARGE SUMMARY
Patient Name: Craig Smalls  MRN: [id number]  : 1965  DOS: 2021    Attending: Silverio Montero MD    Primary Care Provider: Albert Delgado MD    Date of Admission:.2021  8:24 AM    Date of Discharge:  2021    Discharge Diagnosis:     S/P proctocolectomy( total mesorectal excision, ileal pouch, pouch anal anastomosis, protecting loop ileostomy, resection of accessory spleen)       Chronic ulcerative colitis (CMS/HCC)    Leukocytosis, likely reactive    Acute blood loss anemia    Hyperkalemia, likely related to IVF      Hospital Course  At admit    Patient is a pleasant 55 y.o. male presented for scheduled surgery by Dr. Silverio Montero  Patient with history of ulcerative colitis for about 7 years.  He has been on biologic therapy Entyvio.  Preop evaluation with CT scan showed thickening of the colon.  Today he underwent extensive procedures listed below under general anesthesia, tolerated surgery well, was admitted for further management.     When seen in his room after surgery, he is doing well, expected postoperative pain.  Already ambulated.  No nausea or vomiting.  No shortness of breath.    After admit    Patient was provided pain medication as needed for pain control.  He received DVT prophylaxis with subcutaneous heparin as well as mechanicals      Patient was started on clear liquid diet, this was advanced and he showed evidence of bowel function return.    Tolerated diet without difficulty.    He was seen by WOCN, received extensive education and instructions during his stay. All pt questions were addressed;  education folder, stomal care, fluids, diet, activity, work, lifting restrictions for 6-8 weeks were reviewed.    Patient used an IS for atelectasis prophylaxis.    Home medications were resumed as appropriate, and labs were monitored and patient required transfusion of 2 units of packed red blood cells.  Hemoglobin hematocrit responded well.  Remainder of the labs were fairly  unremarkable.   He had mild asymptomatic hyponatremia.    With the progress he has made, pt is ready for DC home today.      Discussed with patient regarding plan and he shows understanding and agreement.       Procedures Performed  Procedure(s):  Proctocolectomy, total mesorectal excision, Ileal pouch, Pouch anal anastomosis, Protecting loop ileostomy, Resection of accessory spleen       Pertinent Test Results:    I reviewed the patient's new clinical results.   Results from last 7 days   Lab Units 01/23/21  0406 01/22/21  0433 01/21/21 0448   WBC 10*3/mm3 10.52 11.52* 13.25*   HEMOGLOBIN g/dL 9.2* 7.2* 8.0*   HEMATOCRIT % 27.6* 21.5* 23.5*   PLATELETS 10*3/mm3 226 176 180     Results from last 7 days   Lab Units 01/22/21  0433 01/21/21  0448 01/20/21  0416 01/18/21  1325   SODIUM mmol/L 133* 134* 134* 140   POTASSIUM mmol/L 4.0 4.3 5.8* 4.1   CHLORIDE mmol/L 103 102 103 105   CO2 mmol/L 25.0 26.0 24.0 25.0   BUN mg/dL 6 9 11 13   CREATININE mg/dL 0.57* 0.63* 0.80 0.76   CALCIUM mg/dL 7.9* 8.2* 8.5* 9.2   BILIRUBIN mg/dL  --   --   --  0.6   ALK PHOS U/L  --   --   --  74   ALT (SGPT) U/L  --   --   --  13   AST (SGOT) U/L  --   --   --  17   GLUCOSE mg/dL 102* 101* 178* 75     I reviewed the patient's new imaging including images and reports.  Wound care nurse  wocn follow up for discharge care of loop ileostomy.     Patient emptied pouch into toilet, standing ( >900mL)      Patient removed old pouch with adhesive remover. wocn removed bridge with no complications. Patient measured stoma at 38mm. Slight MC separation at 3 oclock. Patient cut and applied wafer and snapped on pouch with no complications. Diet, pouch changes, showering, follow up care and electrolyte replacement/ hydration reviewed. patient and wife verbalized understanding.      Discharge supplies given. Planned dc home with home health tomorrow (sat. 1/23)      wocn will continue to follow. Please contact with questions or concerns.      Supplies  "used: [place] drainable pouch [id number] holister  2 3/4\"   Convex wafer [id number]                Discharge Assessment:    Vital Signs  Visit Vitals  /70 (BP Location: Right arm, Patient Position: Lying)   Pulse 94   Temp 98.5 °F (36.9 °C) (Oral)   Resp 18   Ht 175.3 cm (69\")   Wt 74.8 kg (165 lb)   SpO2 95%   BMI 24.37 kg/m²     Temp (24hrs), Av.7 °F (37.1 °C), Min:97.9 °F (36.6 °C), Max:99.2 °F (37.3 °C)      General Appearance:    Alert, cooperative, in no acute distress   Lungs:     Clear to auscultation,respirations regular, even and                   unlabored    Heart:    Regular rhythm and normal rate, normal S1 and S2    Abdomen:   Soft and benign pink stoma.  Clean dry intact dressing over abdominal incision.  JANNET drain is out   Extremities:   Moves all extremities well, no edema, no cyanosis, no              redness   Pulses:   Pulses palpable and equal bilaterally   Skin:   No bleeding, bruising or rash            Discharge Disposition: Home          Discharge Medications      New Medications      Instructions Start Date   HYDROcodone-acetaminophen 7.5-325 MG per tablet  Commonly known as: NORCO   1 tablet, Oral, Every 4 Hours PRN         Continue These Medications      Instructions Start Date   Budesonide 3 MG 24 hr capsule  Commonly known as: ENTOCORT EC   9 mg, Oral, Every Morning      DRY EYES OP   Ophthalmic, Daily PRN      escitalopram 10 MG tablet  Commonly known as: LEXAPRO   10 mg, Oral, Daily      Melatonin 10 MG capsule   10 mg, Oral, Nightly      multivitamin with minerals tablet tablet   1 tablet, Oral, Daily      PREDNISOL OP   1 drop, Ophthalmic, 4 Times Daily      simethicone 125 MG chewable tablet  Commonly known as: MYLICON   125 mg, Oral, Every 6 Hours PRN      tamsulosin 0.4 MG capsule 24 hr capsule  Commonly known as: FLOMAX   1 capsule, Oral, 2 times daily         Stop These Medications    Entyvio 300 MG injection  Generic drug: vedolizumab            Discharge Diet: Information " provided    Activity at Discharge: Ambulate.  Restrictions per colorectal surgery.    Follow-up Appointments  Silverio Pickett MD per his orders      Discharge took over 30 min    Dragon disclaimer:  Part of this encounter note is an electronic transcription/translation of spoken language to printed text. The electronic translation of spoken language may permit erroneous, or at times, nonsensical words or phrases to be inadvertently transcribed; Although I have reviewed the note for such errors, some may still exist.       Zane Gonzales MD  01/23/21  12:42 EST

## 2021-01-23 NOTE — DISCHARGE INSTR - APPOINTMENTS
Please call Dr. Montero's office on Monday to schedule a follow up for the week of Feb.4, 2021 to Feb 11, 2021.  [phone]

## 2021-01-23 NOTE — PLAN OF CARE
Problem: Adult Inpatient Plan of Care  Goal: Plan of Care Review  Outcome: Ongoing, Progressing  Flowsheets  Taken 1/22/2021 0309 by Alice Longoria RN  Plan of Care Reviewed With: patient  Outcome Summary: VSS. Patient walked in hallway this shift. ileostomy bag was changed 2x this shift as it was leaking. JANNET Drain and wells will be discontinued this AM. Patient slept intermittengly.  Taken 1/20/2021 0339 by Saritha Guadalupe RN  Progress: improving  Goal: Absence of Hospital-Acquired Illness or Injury  Outcome: Ongoing, Progressing  Intervention: Identify and Manage Fall Risk  Recent Flowsheet Documentation  Taken 1/23/2021 0200 by Kylah Palomino RN  Safety Promotion/Fall Prevention: safety round/check completed  Taken 1/23/2021 0000 by Kylah Palomino RN  Safety Promotion/Fall Prevention: safety round/check completed  Taken 1/22/2021 2200 by Klyah Palomino RN  Safety Promotion/Fall Prevention: safety round/check completed  Taken 1/22/2021 2000 by Kylah Palomino RN  Safety Promotion/Fall Prevention: safety round/check completed  Intervention: Prevent Skin Injury  Recent Flowsheet Documentation  Taken 1/23/2021 0200 by Kylah Palomino RN  Body Position: position changed independently  Taken 1/23/2021 0000 by Kylah Palomino RN  Body Position: position changed independently  Taken 1/22/2021 2200 by Kylah Palomino RN  Body Position: position changed independently  Taken 1/22/2021 2000 by Kylah Palomino RN  Body Position: position changed independently  Intervention: Prevent and Manage VTE (venous thromboembolism) Risk  Recent Flowsheet Documentation  Taken 1/22/2021 2000 by Kylah Palomino RN  VTE Prevention/Management:   bilateral   sequential compression devices off   bleeding risk factor(s) identified  Goal: Optimal Comfort and Wellbeing  Outcome: Ongoing, Progressing  Goal: Readiness for Transition of Care  Outcome: Ongoing, Progressing     Problem: Fall Injury Risk  Goal: Absence of  Fall and Fall-Related Injury  Outcome: Ongoing, Progressing  Intervention: Identify and Manage Contributors to Fall Injury Risk  Recent Flowsheet Documentation  Taken 1/23/2021 0200 by Kylah Palomino RN  Medication Review/Management: medications reviewed  Taken 1/23/2021 0000 by Kylah Palomino RN  Medication Review/Management: medications reviewed  Taken 1/22/2021 2200 by Kylah Palomino RN  Medication Review/Management: medications reviewed  Taken 1/22/2021 2000 by Kylah Palomino RN  Medication Review/Management: medications reviewed  Intervention: Promote Injury-Free Environment  Recent Flowsheet Documentation  Taken 1/23/2021 0200 by Kylah Palomino RN  Safety Promotion/Fall Prevention: safety round/check completed  Taken 1/23/2021 0000 by Kylah Palomino RN  Safety Promotion/Fall Prevention: safety round/check completed  Taken 1/22/2021 2200 by Kylah Palomino RN  Safety Promotion/Fall Prevention: safety round/check completed  Taken 1/22/2021 2000 by Kylah Palomino RN  Safety Promotion/Fall Prevention: safety round/check completed   Goal Outcome Evaluation:  Plan of Care Reviewed With: patient  Progress: improving

## 2021-01-24 ENCOUNTER — NURSE TRIAGE (OUTPATIENT)
Dept: CALL CENTER | Facility: HOSPITAL | Age: 56
End: 2021-01-24

## 2021-01-24 NOTE — TELEPHONE ENCOUNTER
"    Reason for Disposition  • Health Information question, no triage required and triager able to answer question    Additional Information  • Negative: [1] Caller is not with the adult (patient) AND [2] reporting urgent symptoms  • Negative: Lab result questions  • Negative: Medication questions  • Negative: Caller can't be reached by phone  • Negative: Caller has already spoken to PCP or another triager  • Negative: RN needs further essential information from caller in order to complete triage  • Negative: Requesting regular office appointment  • Negative: [1] Caller requesting NON-URGENT health information AND [2] PCP's office is the best resource    Answer Assessment - Initial Assessment Questions  1. REASON FOR CALL or QUESTION: \"What is your reason for calling today?\" or \"How can I best help you?\" or \"What question do you have that I can help answer?\"      She was told to call surgeon Monday if his ostomy is putting out more than 1L per day. He has already passed a L today and she is concerned. She was instructed to call surgeon on call or go to ER for further.    Protocols used: INFORMATION ONLY CALL - NO TRIAGE-ADULT-      "

## 2021-01-25 ENCOUNTER — APPOINTMENT (OUTPATIENT)
Dept: GENERAL RADIOLOGY | Facility: HOSPITAL | Age: 56
End: 2021-01-25

## 2021-01-25 ENCOUNTER — APPOINTMENT (OUTPATIENT)
Dept: CT IMAGING | Facility: HOSPITAL | Age: 56
End: 2021-01-25

## 2021-01-25 ENCOUNTER — HOSPITAL ENCOUNTER (INPATIENT)
Facility: HOSPITAL | Age: 56
LOS: 10 days | Discharge: HOME HEALTH | End: 2021-02-04
Attending: EMERGENCY MEDICINE | Admitting: INTERNAL MEDICINE

## 2021-01-25 DIAGNOSIS — K56.609 SMALL BOWEL OBSTRUCTION (HCC): Primary | ICD-10-CM

## 2021-01-25 DIAGNOSIS — G89.18 POSTOPERATIVE ABDOMINAL PAIN: ICD-10-CM

## 2021-01-25 DIAGNOSIS — Z90.49 S/P PROCTOCOLECTOMY: ICD-10-CM

## 2021-01-25 DIAGNOSIS — T14.8XXA HEMATOMA: ICD-10-CM

## 2021-01-25 DIAGNOSIS — Z98.890 S/P LAPAROTOMY: ICD-10-CM

## 2021-01-25 DIAGNOSIS — R10.9 POSTOPERATIVE ABDOMINAL PAIN: ICD-10-CM

## 2021-01-25 PROBLEM — N40.0 BENIGN PROSTATIC HYPERPLASIA WITHOUT LOWER URINARY TRACT SYMPTOMS: Status: ACTIVE | Noted: 2021-01-25

## 2021-01-25 LAB
ABO GROUP BLD: NORMAL
ALBUMIN SERPL-MCNC: 3.3 G/DL (ref 3.5–5.2)
ALBUMIN/GLOB SERPL: 0.9 G/DL
ALP SERPL-CCNC: 77 U/L (ref 39–117)
ALT SERPL W P-5'-P-CCNC: 21 U/L (ref 1–41)
ANION GAP SERPL CALCULATED.3IONS-SCNC: 18 MMOL/L (ref 5–15)
AST SERPL-CCNC: 28 U/L (ref 1–40)
BASOPHILS # BLD AUTO: 0.01 10*3/MM3 (ref 0–0.2)
BASOPHILS NFR BLD AUTO: 0.1 % (ref 0–1.5)
BILIRUB SERPL-MCNC: 1 MG/DL (ref 0–1.2)
BLD GP AB SCN SERPL QL: NEGATIVE
BUN SERPL-MCNC: 18 MG/DL (ref 6–20)
BUN/CREAT SERPL: 24 (ref 7–25)
CALCIUM SPEC-SCNC: 9.3 MG/DL (ref 8.6–10.5)
CHLORIDE SERPL-SCNC: 97 MMOL/L (ref 98–107)
CO2 SERPL-SCNC: 22 MMOL/L (ref 22–29)
CREAT SERPL-MCNC: 0.75 MG/DL (ref 0.76–1.27)
D-LACTATE SERPL-SCNC: 1.5 MMOL/L (ref 0.5–2)
DEPRECATED RDW RBC AUTO: 45.1 FL (ref 37–54)
EOSINOPHIL # BLD AUTO: 0.64 10*3/MM3 (ref 0–0.4)
EOSINOPHIL NFR BLD AUTO: 4.8 % (ref 0.3–6.2)
ERYTHROCYTE [DISTWIDTH] IN BLOOD BY AUTOMATED COUNT: 12.9 % (ref 12.3–15.4)
GFR SERPL CREATININE-BSD FRML MDRD: 107 ML/MIN/1.73
GLOBULIN UR ELPH-MCNC: 3.8 GM/DL
GLUCOSE SERPL-MCNC: 131 MG/DL (ref 65–99)
HCT VFR BLD AUTO: 32.4 % (ref 37.5–51)
HGB BLD-MCNC: 10.6 G/DL (ref 13–17.7)
HOLD SPECIMEN: NORMAL
HOLD SPECIMEN: NORMAL
IMM GRANULOCYTES # BLD AUTO: 0.14 10*3/MM3 (ref 0–0.05)
IMM GRANULOCYTES NFR BLD AUTO: 1.1 % (ref 0–0.5)
LIPASE SERPL-CCNC: 19 U/L (ref 13–60)
LYMPHOCYTES # BLD AUTO: 1.15 10*3/MM3 (ref 0.7–3.1)
LYMPHOCYTES NFR BLD AUTO: 8.6 % (ref 19.6–45.3)
MCH RBC QN AUTO: 31.3 PG (ref 26.6–33)
MCHC RBC AUTO-ENTMCNC: 32.7 G/DL (ref 31.5–35.7)
MCV RBC AUTO: 95.6 FL (ref 79–97)
MONOCYTES # BLD AUTO: 1.33 10*3/MM3 (ref 0.1–0.9)
MONOCYTES NFR BLD AUTO: 10 % (ref 5–12)
NEUTROPHILS NFR BLD AUTO: 10.04 10*3/MM3 (ref 1.7–7)
NEUTROPHILS NFR BLD AUTO: 75.4 % (ref 42.7–76)
NRBC BLD AUTO-RTO: 0.2 /100 WBC (ref 0–0.2)
PLATELET # BLD AUTO: 396 10*3/MM3 (ref 140–450)
PMV BLD AUTO: 9.3 FL (ref 6–12)
POTASSIUM SERPL-SCNC: 4.1 MMOL/L (ref 3.5–5.2)
PROT SERPL-MCNC: 7.1 G/DL (ref 6–8.5)
RBC # BLD AUTO: 3.39 10*6/MM3 (ref 4.14–5.8)
RH BLD: POSITIVE
SODIUM SERPL-SCNC: 137 MMOL/L (ref 136–145)
T&S EXPIRATION DATE: NORMAL
TROPONIN T SERPL-MCNC: <0.01 NG/ML (ref 0–0.03)
WBC # BLD AUTO: 13.31 10*3/MM3 (ref 3.4–10.8)
WHOLE BLOOD HOLD SPECIMEN: NORMAL
WHOLE BLOOD HOLD SPECIMEN: NORMAL

## 2021-01-25 PROCEDURE — 80053 COMPREHEN METABOLIC PANEL: CPT | Performed by: EMERGENCY MEDICINE

## 2021-01-25 PROCEDURE — 0 DIATRIZOATE MEGLUMINE & SODIUM PER 1 ML: Performed by: COLON & RECTAL SURGERY

## 2021-01-25 PROCEDURE — 99284 EMERGENCY DEPT VISIT MOD MDM: CPT

## 2021-01-25 PROCEDURE — 85025 COMPLETE CBC W/AUTO DIFF WBC: CPT | Performed by: EMERGENCY MEDICINE

## 2021-01-25 PROCEDURE — 25010000002 IOPAMIDOL 61 % SOLUTION: Performed by: EMERGENCY MEDICINE

## 2021-01-25 PROCEDURE — 86900 BLOOD TYPING SEROLOGIC ABO: CPT | Performed by: COLON & RECTAL SURGERY

## 2021-01-25 PROCEDURE — 74177 CT ABD & PELVIS W/CONTRAST: CPT

## 2021-01-25 PROCEDURE — 0 DIATRIZOATE MEGLUMINE & SODIUM PER 1 ML

## 2021-01-25 PROCEDURE — 71045 X-RAY EXAM CHEST 1 VIEW: CPT

## 2021-01-25 PROCEDURE — 93005 ELECTROCARDIOGRAM TRACING: CPT | Performed by: EMERGENCY MEDICINE

## 2021-01-25 PROCEDURE — 83690 ASSAY OF LIPASE: CPT | Performed by: EMERGENCY MEDICINE

## 2021-01-25 PROCEDURE — 83605 ASSAY OF LACTIC ACID: CPT | Performed by: EMERGENCY MEDICINE

## 2021-01-25 PROCEDURE — 84484 ASSAY OF TROPONIN QUANT: CPT | Performed by: EMERGENCY MEDICINE

## 2021-01-25 PROCEDURE — 25010000002 HYDROMORPHONE PER 4 MG: Performed by: INTERNAL MEDICINE

## 2021-01-25 PROCEDURE — 86901 BLOOD TYPING SEROLOGIC RH(D): CPT | Performed by: COLON & RECTAL SURGERY

## 2021-01-25 PROCEDURE — 25010000002 PIPERACILLIN SOD-TAZOBACTAM PER 1 G: Performed by: EMERGENCY MEDICINE

## 2021-01-25 PROCEDURE — 86850 RBC ANTIBODY SCREEN: CPT | Performed by: COLON & RECTAL SURGERY

## 2021-01-25 RX ORDER — NALOXONE HCL 0.4 MG/ML
0.4 VIAL (ML) INJECTION
Status: DISCONTINUED | OUTPATIENT
Start: 2021-01-25 | End: 2021-02-04 | Stop reason: HOSPADM

## 2021-01-25 RX ORDER — ESCITALOPRAM OXALATE 10 MG/1
10 TABLET ORAL DAILY
Status: DISCONTINUED | OUTPATIENT
Start: 2021-01-26 | End: 2021-02-04 | Stop reason: HOSPADM

## 2021-01-25 RX ORDER — HYDROMORPHONE HYDROCHLORIDE 1 MG/ML
0.5 INJECTION, SOLUTION INTRAMUSCULAR; INTRAVENOUS; SUBCUTANEOUS
Status: DISPENSED | OUTPATIENT
Start: 2021-01-25 | End: 2021-02-01

## 2021-01-25 RX ORDER — PANTOPRAZOLE SODIUM 40 MG/10ML
40 INJECTION, POWDER, LYOPHILIZED, FOR SOLUTION INTRAVENOUS ONCE
Status: COMPLETED | OUTPATIENT
Start: 2021-01-25 | End: 2021-01-25

## 2021-01-25 RX ORDER — ZOLPIDEM TARTRATE 5 MG/1
10 TABLET ORAL NIGHTLY
Status: DISCONTINUED | OUTPATIENT
Start: 2021-01-25 | End: 2021-01-28

## 2021-01-25 RX ORDER — SODIUM CHLORIDE 0.9 % (FLUSH) 0.9 %
10 SYRINGE (ML) INJECTION EVERY 12 HOURS SCHEDULED
Status: DISCONTINUED | OUTPATIENT
Start: 2021-01-25 | End: 2021-02-04 | Stop reason: HOSPADM

## 2021-01-25 RX ORDER — TAMSULOSIN HYDROCHLORIDE 0.4 MG/1
0.4 CAPSULE ORAL 2 TIMES DAILY
Status: DISCONTINUED | OUTPATIENT
Start: 2021-01-25 | End: 2021-02-04 | Stop reason: HOSPADM

## 2021-01-25 RX ORDER — SODIUM CHLORIDE, SODIUM LACTATE, POTASSIUM CHLORIDE, CALCIUM CHLORIDE 600; 310; 30; 20 MG/100ML; MG/100ML; MG/100ML; MG/100ML
100 INJECTION, SOLUTION INTRAVENOUS CONTINUOUS
Status: DISCONTINUED | OUTPATIENT
Start: 2021-01-25 | End: 2021-02-03

## 2021-01-25 RX ORDER — SODIUM CHLORIDE 0.9 % (FLUSH) 0.9 %
10 SYRINGE (ML) INJECTION AS NEEDED
Status: DISCONTINUED | OUTPATIENT
Start: 2021-01-25 | End: 2021-02-04 | Stop reason: HOSPADM

## 2021-01-25 RX ORDER — LABETALOL HYDROCHLORIDE 5 MG/ML
10 INJECTION, SOLUTION INTRAVENOUS EVERY 4 HOURS PRN
Status: ACTIVE | OUTPATIENT
Start: 2021-01-25 | End: 2021-01-28

## 2021-01-25 RX ORDER — ONDANSETRON 4 MG/1
4 TABLET, FILM COATED ORAL EVERY 6 HOURS PRN
Status: DISCONTINUED | OUTPATIENT
Start: 2021-01-25 | End: 2021-01-29

## 2021-01-25 RX ORDER — ACETAMINOPHEN 325 MG/1
650 TABLET ORAL EVERY 4 HOURS PRN
Status: DISCONTINUED | OUTPATIENT
Start: 2021-01-25 | End: 2021-02-04 | Stop reason: HOSPADM

## 2021-01-25 RX ORDER — ONDANSETRON 2 MG/ML
4 INJECTION INTRAMUSCULAR; INTRAVENOUS EVERY 6 HOURS PRN
Status: DISCONTINUED | OUTPATIENT
Start: 2021-01-25 | End: 2021-01-29

## 2021-01-25 RX ADMIN — ACETAMINOPHEN 650 MG: 325 TABLET, FILM COATED ORAL at 23:31

## 2021-01-25 RX ADMIN — DIATRIZOATE MEGLUMINE AND DIATRIZOATE SODIUM 15 ML: 660; 100 LIQUID ORAL; RECTAL at 20:38

## 2021-01-25 RX ADMIN — HYDROMORPHONE HYDROCHLORIDE 0.5 MG: 1 INJECTION, SOLUTION INTRAMUSCULAR; INTRAVENOUS; SUBCUTANEOUS at 21:46

## 2021-01-25 RX ADMIN — SODIUM CHLORIDE 2000 ML: 9 INJECTION, SOLUTION INTRAVENOUS at 15:42

## 2021-01-25 RX ADMIN — PANTOPRAZOLE SODIUM 40 MG: 40 INJECTION, POWDER, FOR SOLUTION INTRAVENOUS at 15:41

## 2021-01-25 RX ADMIN — IOPAMIDOL 97 ML: 612 INJECTION, SOLUTION INTRAVENOUS at 15:38

## 2021-01-25 RX ADMIN — SODIUM CHLORIDE, POTASSIUM CHLORIDE, SODIUM LACTATE AND CALCIUM CHLORIDE 100 ML/HR: 600; 310; 30; 20 INJECTION, SOLUTION INTRAVENOUS at 21:19

## 2021-01-25 RX ADMIN — TAZOBACTAM SODIUM AND PIPERACILLIN SODIUM 3.38 G: 375; 3 INJECTION, SOLUTION INTRAVENOUS at 17:25

## 2021-01-25 RX ADMIN — TAMSULOSIN HYDROCHLORIDE 0.4 MG: 0.4 CAPSULE ORAL at 21:19

## 2021-01-25 RX ADMIN — SODIUM CHLORIDE, PRESERVATIVE FREE 10 ML: 5 INJECTION INTRAVENOUS at 21:20

## 2021-01-25 RX ADMIN — DIATRIZOATE MEGLUMINE AND DIATRIZOATE SODIUM 15 ML: 660; 100 LIQUID ORAL; RECTAL at 23:31

## 2021-01-25 RX ADMIN — ZOLPIDEM TARTRATE 10 MG: 5 TABLET, FILM COATED ORAL at 20:31

## 2021-01-26 ENCOUNTER — APPOINTMENT (OUTPATIENT)
Dept: CT IMAGING | Facility: HOSPITAL | Age: 56
End: 2021-01-26

## 2021-01-26 ENCOUNTER — ANESTHESIA (OUTPATIENT)
Dept: PERIOP | Facility: HOSPITAL | Age: 56
End: 2021-01-26

## 2021-01-26 ENCOUNTER — ANESTHESIA EVENT (OUTPATIENT)
Dept: PERIOP | Facility: HOSPITAL | Age: 56
End: 2021-01-26

## 2021-01-26 LAB
ALBUMIN SERPL-MCNC: 2.8 G/DL (ref 3.5–5.2)
ALBUMIN/GLOB SERPL: 0.8 G/DL
ALP SERPL-CCNC: 66 U/L (ref 39–117)
ALT SERPL W P-5'-P-CCNC: 18 U/L (ref 1–41)
ANION GAP SERPL CALCULATED.3IONS-SCNC: 14 MMOL/L (ref 5–15)
ANION GAP SERPL CALCULATED.3IONS-SCNC: 15 MMOL/L (ref 5–15)
ANION GAP SERPL CALCULATED.3IONS-SCNC: 16 MMOL/L (ref 5–15)
AST SERPL-CCNC: 28 U/L (ref 1–40)
BASOPHILS # BLD AUTO: 0.04 10*3/MM3 (ref 0–0.2)
BASOPHILS # BLD AUTO: 0.04 10*3/MM3 (ref 0–0.2)
BASOPHILS NFR BLD AUTO: 0.3 % (ref 0–1.5)
BASOPHILS NFR BLD AUTO: 0.3 % (ref 0–1.5)
BILIRUB SERPL-MCNC: 0.9 MG/DL (ref 0–1.2)
BILIRUB UR QL STRIP: NEGATIVE
BUN SERPL-MCNC: 12 MG/DL (ref 6–20)
BUN SERPL-MCNC: 12 MG/DL (ref 6–20)
BUN SERPL-MCNC: 13 MG/DL (ref 6–20)
BUN/CREAT SERPL: 20 (ref 7–25)
BUN/CREAT SERPL: 21.3 (ref 7–25)
BUN/CREAT SERPL: 24 (ref 7–25)
CA-I SERPL ISE-MCNC: 1.28 MMOL/L (ref 1.12–1.32)
CALCIUM SPEC-SCNC: 8.7 MG/DL (ref 8.6–10.5)
CHLORIDE SERPL-SCNC: 102 MMOL/L (ref 98–107)
CHLORIDE SERPL-SCNC: 95 MMOL/L (ref 98–107)
CHLORIDE SERPL-SCNC: 97 MMOL/L (ref 98–107)
CHOLEST SERPL-MCNC: 86 MG/DL (ref 0–200)
CLARITY UR: CLEAR
CO2 SERPL-SCNC: 19 MMOL/L (ref 22–29)
CO2 SERPL-SCNC: 21 MMOL/L (ref 22–29)
CO2 SERPL-SCNC: 23 MMOL/L (ref 22–29)
COLOR UR: YELLOW
CREAT SERPL-MCNC: 0.5 MG/DL (ref 0.76–1.27)
CREAT SERPL-MCNC: 0.6 MG/DL (ref 0.76–1.27)
CREAT SERPL-MCNC: 0.61 MG/DL (ref 0.76–1.27)
CRP SERPL-MCNC: 27.7 MG/DL (ref 0–0.5)
D-LACTATE SERPL-SCNC: 0.8 MMOL/L (ref 0.5–2)
DEPRECATED RDW RBC AUTO: 45.8 FL (ref 37–54)
DEPRECATED RDW RBC AUTO: 46.7 FL (ref 37–54)
EOSINOPHIL # BLD AUTO: 0.34 10*3/MM3 (ref 0–0.4)
EOSINOPHIL # BLD AUTO: 0.8 10*3/MM3 (ref 0–0.4)
EOSINOPHIL NFR BLD AUTO: 2.5 % (ref 0.3–6.2)
EOSINOPHIL NFR BLD AUTO: 6 % (ref 0.3–6.2)
ERYTHROCYTE [DISTWIDTH] IN BLOOD BY AUTOMATED COUNT: 12.9 % (ref 12.3–15.4)
ERYTHROCYTE [DISTWIDTH] IN BLOOD BY AUTOMATED COUNT: 13 % (ref 12.3–15.4)
GFR SERPL CREATININE-BSD FRML MDRD: 136 ML/MIN/1.73
GFR SERPL CREATININE-BSD FRML MDRD: 139 ML/MIN/1.73
GFR SERPL CREATININE-BSD FRML MDRD: >150 ML/MIN/1.73
GLOBULIN UR ELPH-MCNC: 3.4 GM/DL
GLUCOSE BLDC GLUCOMTR-MCNC: 103 MG/DL (ref 70–130)
GLUCOSE BLDC GLUCOMTR-MCNC: 121 MG/DL (ref 70–130)
GLUCOSE SERPL-MCNC: 100 MG/DL (ref 65–99)
GLUCOSE SERPL-MCNC: 102 MG/DL (ref 65–99)
GLUCOSE SERPL-MCNC: 110 MG/DL (ref 65–99)
GLUCOSE UR STRIP-MCNC: NEGATIVE MG/DL
HCT VFR BLD AUTO: 30.7 % (ref 37.5–51)
HCT VFR BLD AUTO: 33.4 % (ref 37.5–51)
HGB BLD-MCNC: 10.3 G/DL (ref 13–17.7)
HGB BLD-MCNC: 10.8 G/DL (ref 13–17.7)
HGB UR QL STRIP.AUTO: ABNORMAL
IMM GRANULOCYTES # BLD AUTO: 0.2 10*3/MM3 (ref 0–0.05)
IMM GRANULOCYTES # BLD AUTO: 0.21 10*3/MM3 (ref 0–0.05)
IMM GRANULOCYTES NFR BLD AUTO: 1.5 % (ref 0–0.5)
IMM GRANULOCYTES NFR BLD AUTO: 1.5 % (ref 0–0.5)
KETONES UR QL STRIP: ABNORMAL
LEUKOCYTE ESTERASE UR QL STRIP.AUTO: NEGATIVE
LYMPHOCYTES # BLD AUTO: 1.17 10*3/MM3 (ref 0.7–3.1)
LYMPHOCYTES # BLD AUTO: 1.3 10*3/MM3 (ref 0.7–3.1)
LYMPHOCYTES NFR BLD AUTO: 8.5 % (ref 19.6–45.3)
LYMPHOCYTES NFR BLD AUTO: 9.7 % (ref 19.6–45.3)
MAGNESIUM SERPL-MCNC: 1.9 MG/DL (ref 1.6–2.6)
MAGNESIUM SERPL-MCNC: 2.3 MG/DL (ref 1.6–2.6)
MCH RBC QN AUTO: 32 PG (ref 26.6–33)
MCH RBC QN AUTO: 32.8 PG (ref 26.6–33)
MCHC RBC AUTO-ENTMCNC: 32.3 G/DL (ref 31.5–35.7)
MCHC RBC AUTO-ENTMCNC: 33.6 G/DL (ref 31.5–35.7)
MCV RBC AUTO: 97.8 FL (ref 79–97)
MCV RBC AUTO: 99.1 FL (ref 79–97)
MONOCYTES # BLD AUTO: 1.28 10*3/MM3 (ref 0.1–0.9)
MONOCYTES # BLD AUTO: 1.42 10*3/MM3 (ref 0.1–0.9)
MONOCYTES NFR BLD AUTO: 10.6 % (ref 5–12)
MONOCYTES NFR BLD AUTO: 9.3 % (ref 5–12)
NEUTROPHILS NFR BLD AUTO: 10.73 10*3/MM3 (ref 1.7–7)
NEUTROPHILS NFR BLD AUTO: 71.9 % (ref 42.7–76)
NEUTROPHILS NFR BLD AUTO: 77.9 % (ref 42.7–76)
NEUTROPHILS NFR BLD AUTO: 9.64 10*3/MM3 (ref 1.7–7)
NITRITE UR QL STRIP: NEGATIVE
NRBC BLD AUTO-RTO: 0 /100 WBC (ref 0–0.2)
NRBC BLD AUTO-RTO: 0 /100 WBC (ref 0–0.2)
PH UR STRIP.AUTO: 5.5 [PH] (ref 5–8)
PHOSPHATE SERPL-MCNC: 3.2 MG/DL (ref 2.5–4.5)
PLATELET # BLD AUTO: 423 10*3/MM3 (ref 140–450)
PLATELET # BLD AUTO: 443 10*3/MM3 (ref 140–450)
PMV BLD AUTO: 9 FL (ref 6–12)
PMV BLD AUTO: 9.1 FL (ref 6–12)
POTASSIUM SERPL-SCNC: 4 MMOL/L (ref 3.5–5.2)
POTASSIUM SERPL-SCNC: 4.1 MMOL/L (ref 3.5–5.2)
POTASSIUM SERPL-SCNC: 4.4 MMOL/L (ref 3.5–5.2)
PREALB SERPL-MCNC: 6.7 MG/DL (ref 20–40)
PROT SERPL-MCNC: 6.2 G/DL (ref 6–8.5)
PROT UR QL STRIP: ABNORMAL
QT INTERVAL: 368 MS
QTC INTERVAL: 442 MS
RBC # BLD AUTO: 3.14 10*6/MM3 (ref 4.14–5.8)
RBC # BLD AUTO: 3.37 10*6/MM3 (ref 4.14–5.8)
SARS-COV-2 RDRP RESP QL NAA+PROBE: NORMAL
SARS-COV-2 RNA RESP QL NAA+PROBE: NOT DETECTED
SODIUM SERPL-SCNC: 130 MMOL/L (ref 136–145)
SODIUM SERPL-SCNC: 132 MMOL/L (ref 136–145)
SODIUM SERPL-SCNC: 140 MMOL/L (ref 136–145)
SP GR UR STRIP: 1.04 (ref 1–1.03)
TRIGL SERPL-MCNC: 70 MG/DL (ref 0–150)
UROBILINOGEN UR QL STRIP: ABNORMAL
WBC # BLD AUTO: 13.4 10*3/MM3 (ref 3.4–10.8)
WBC # BLD AUTO: 13.77 10*3/MM3 (ref 3.4–10.8)

## 2021-01-26 PROCEDURE — 86140 C-REACTIVE PROTEIN: CPT

## 2021-01-26 PROCEDURE — U0003 INFECTIOUS AGENT DETECTION BY NUCLEIC ACID (DNA OR RNA); SEVERE ACUTE RESPIRATORY SYNDROME CORONAVIRUS 2 (SARS-COV-2) (CORONAVIRUS DISEASE [COVID-19]), AMPLIFIED PROBE TECHNIQUE, MAKING USE OF HIGH THROUGHPUT TECHNOLOGIES AS DESCRIBED BY CMS-2020-01-R: HCPCS | Performed by: COLON & RECTAL SURGERY

## 2021-01-26 PROCEDURE — 25010000002 CALCIUM GLUCONATE PER 10 ML

## 2021-01-26 PROCEDURE — 25010000002 SUCCINYLCHOLINE PER 20 MG: Performed by: ANESTHESIOLOGY

## 2021-01-26 PROCEDURE — 74178 CT ABD&PLV WO CNTR FLWD CNTR: CPT

## 2021-01-26 PROCEDURE — 83735 ASSAY OF MAGNESIUM: CPT | Performed by: INTERNAL MEDICINE

## 2021-01-26 PROCEDURE — 80048 BASIC METABOLIC PNL TOTAL CA: CPT | Performed by: COLON & RECTAL SURGERY

## 2021-01-26 PROCEDURE — 83735 ASSAY OF MAGNESIUM: CPT

## 2021-01-26 PROCEDURE — 82465 ASSAY BLD/SERUM CHOLESTEROL: CPT

## 2021-01-26 PROCEDURE — 87206 SMEAR FLUORESCENT/ACID STAI: CPT | Performed by: COLON & RECTAL SURGERY

## 2021-01-26 PROCEDURE — 81003 URINALYSIS AUTO W/O SCOPE: CPT | Performed by: INTERNAL MEDICINE

## 2021-01-26 PROCEDURE — 87075 CULTR BACTERIA EXCEPT BLOOD: CPT | Performed by: COLON & RECTAL SURGERY

## 2021-01-26 PROCEDURE — 25010000002 PIPERACILLIN SOD-TAZOBACTAM PER 1 G: Performed by: ANESTHESIOLOGY

## 2021-01-26 PROCEDURE — 25010000002 HEPARIN (PORCINE) PER 1000 UNITS: Performed by: COLON & RECTAL SURGERY

## 2021-01-26 PROCEDURE — 87205 SMEAR GRAM STAIN: CPT | Performed by: COLON & RECTAL SURGERY

## 2021-01-26 PROCEDURE — 25010000002 HYDROMORPHONE PER 4 MG: Performed by: COLON & RECTAL SURGERY

## 2021-01-26 PROCEDURE — 3E0436Z INTRODUCTION OF NUTRITIONAL SUBSTANCE INTO CENTRAL VEIN, PERCUTANEOUS APPROACH: ICD-10-PCS | Performed by: COLON & RECTAL SURGERY

## 2021-01-26 PROCEDURE — 25010000002 HYDROMORPHONE PER 4 MG: Performed by: INTERNAL MEDICINE

## 2021-01-26 PROCEDURE — 83605 ASSAY OF LACTIC ACID: CPT | Performed by: COLON & RECTAL SURGERY

## 2021-01-26 PROCEDURE — 82330 ASSAY OF CALCIUM: CPT

## 2021-01-26 PROCEDURE — 84478 ASSAY OF TRIGLYCERIDES: CPT

## 2021-01-26 PROCEDURE — 80048 BASIC METABOLIC PNL TOTAL CA: CPT | Performed by: INTERNAL MEDICINE

## 2021-01-26 PROCEDURE — 25010000002 ONDANSETRON PER 1 MG: Performed by: COLON & RECTAL SURGERY

## 2021-01-26 PROCEDURE — 25010000002 POTASSIUM CHLORIDE PER 2 MEQ OF POTASSIUM

## 2021-01-26 PROCEDURE — 0D9670Z DRAINAGE OF STOMACH WITH DRAINAGE DEVICE, VIA NATURAL OR ARTIFICIAL OPENING: ICD-10-PCS | Performed by: COLON & RECTAL SURGERY

## 2021-01-26 PROCEDURE — 25010000002 PROPOFOL 10 MG/ML EMULSION: Performed by: ANESTHESIOLOGY

## 2021-01-26 PROCEDURE — 25010000002 DAPTOMYCIN PER 1 MG: Performed by: INTERNAL MEDICINE

## 2021-01-26 PROCEDURE — 87070 CULTURE OTHR SPECIMN AEROBIC: CPT | Performed by: COLON & RECTAL SURGERY

## 2021-01-26 PROCEDURE — 25010000002 PIPERACILLIN SOD-TAZOBACTAM PER 1 G: Performed by: COLON & RECTAL SURGERY

## 2021-01-26 PROCEDURE — 87186 SC STD MICRODIL/AGAR DIL: CPT | Performed by: COLON & RECTAL SURGERY

## 2021-01-26 PROCEDURE — 87116 MYCOBACTERIA CULTURE: CPT | Performed by: COLON & RECTAL SURGERY

## 2021-01-26 PROCEDURE — 87040 BLOOD CULTURE FOR BACTERIA: CPT | Performed by: INTERNAL MEDICINE

## 2021-01-26 PROCEDURE — 25010000002 FENTANYL CITRATE (PF) 250 MCG/5ML SOLUTION: Performed by: ANESTHESIOLOGY

## 2021-01-26 PROCEDURE — C1751 CATH, INF, PER/CENT/MIDLINE: HCPCS

## 2021-01-26 PROCEDURE — 0DJD8ZZ INSPECTION OF LOWER INTESTINAL TRACT, VIA NATURAL OR ARTIFICIAL OPENING ENDOSCOPIC: ICD-10-PCS | Performed by: COLON & RECTAL SURGERY

## 2021-01-26 PROCEDURE — 0 IOPAMIDOL PER 1 ML: Performed by: INTERNAL MEDICINE

## 2021-01-26 PROCEDURE — 25010000002 FENTANYL CITRATE (PF) 100 MCG/2ML SOLUTION: Performed by: ANESTHESIOLOGY

## 2021-01-26 PROCEDURE — 25010000002 PIPERACILLIN SOD-TAZOBACTAM PER 1 G: Performed by: INTERNAL MEDICINE

## 2021-01-26 PROCEDURE — 25010000002 MIDAZOLAM PER 1 MG: Performed by: ANESTHESIOLOGY

## 2021-01-26 PROCEDURE — 02HV33Z INSERTION OF INFUSION DEVICE INTO SUPERIOR VENA CAVA, PERCUTANEOUS APPROACH: ICD-10-PCS | Performed by: COLON & RECTAL SURGERY

## 2021-01-26 PROCEDURE — 84134 ASSAY OF PREALBUMIN: CPT | Performed by: INTERNAL MEDICINE

## 2021-01-26 PROCEDURE — 87635 SARS-COV-2 COVID-19 AMP PRB: CPT | Performed by: COLON & RECTAL SURGERY

## 2021-01-26 PROCEDURE — 25010000003 LIDOCAINE 1 % SOLUTION: Performed by: ANESTHESIOLOGY

## 2021-01-26 PROCEDURE — 25010000002 MICAFUNGIN SODIUM 100 MG RECONSTITUTED SOLUTION: Performed by: INTERNAL MEDICINE

## 2021-01-26 PROCEDURE — 80053 COMPREHEN METABOLIC PANEL: CPT

## 2021-01-26 PROCEDURE — 25010000002 DEXAMETHASONE PER 1 MG: Performed by: ANESTHESIOLOGY

## 2021-01-26 PROCEDURE — 84100 ASSAY OF PHOSPHORUS: CPT

## 2021-01-26 PROCEDURE — 82962 GLUCOSE BLOOD TEST: CPT

## 2021-01-26 PROCEDURE — 85025 COMPLETE CBC W/AUTO DIFF WBC: CPT | Performed by: COLON & RECTAL SURGERY

## 2021-01-26 PROCEDURE — 25010000002 NEOSTIGMINE 10 MG/10ML SOLUTION: Performed by: ANESTHESIOLOGY

## 2021-01-26 PROCEDURE — 25010000002 MAGNESIUM SULFATE PER 500 MG OF MAGNESIUM

## 2021-01-26 PROCEDURE — C1894 INTRO/SHEATH, NON-LASER: HCPCS

## 2021-01-26 PROCEDURE — 0WCG0ZZ EXTIRPATION OF MATTER FROM PERITONEAL CAVITY, OPEN APPROACH: ICD-10-PCS | Performed by: COLON & RECTAL SURGERY

## 2021-01-26 RX ORDER — DEXAMETHASONE SODIUM PHOSPHATE 4 MG/ML
INJECTION, SOLUTION INTRA-ARTICULAR; INTRALESIONAL; INTRAMUSCULAR; INTRAVENOUS; SOFT TISSUE AS NEEDED
Status: DISCONTINUED | OUTPATIENT
Start: 2021-01-26 | End: 2021-01-26 | Stop reason: SURG

## 2021-01-26 RX ORDER — FAMOTIDINE 10 MG/ML
20 INJECTION, SOLUTION INTRAVENOUS ONCE
Status: COMPLETED | OUTPATIENT
Start: 2021-01-26 | End: 2021-01-26

## 2021-01-26 RX ORDER — NEOSTIGMINE METHYLSULFATE 1 MG/ML
INJECTION, SOLUTION INTRAVENOUS AS NEEDED
Status: DISCONTINUED | OUTPATIENT
Start: 2021-01-26 | End: 2021-01-26 | Stop reason: SURG

## 2021-01-26 RX ORDER — GLYCOPYRROLATE 0.2 MG/ML
INJECTION INTRAMUSCULAR; INTRAVENOUS AS NEEDED
Status: DISCONTINUED | OUTPATIENT
Start: 2021-01-26 | End: 2021-01-26 | Stop reason: SURG

## 2021-01-26 RX ORDER — SODIUM CHLORIDE, SODIUM LACTATE, POTASSIUM CHLORIDE, CALCIUM CHLORIDE 600; 310; 30; 20 MG/100ML; MG/100ML; MG/100ML; MG/100ML
INJECTION, SOLUTION INTRAVENOUS CONTINUOUS PRN
Status: DISCONTINUED | OUTPATIENT
Start: 2021-01-26 | End: 2021-01-26 | Stop reason: SURG

## 2021-01-26 RX ORDER — MAGNESIUM HYDROXIDE 1200 MG/15ML
LIQUID ORAL AS NEEDED
Status: DISCONTINUED | OUTPATIENT
Start: 2021-01-26 | End: 2021-01-26 | Stop reason: HOSPADM

## 2021-01-26 RX ORDER — ONDANSETRON 2 MG/ML
4 INJECTION INTRAMUSCULAR; INTRAVENOUS EVERY 6 HOURS PRN
Status: DISCONTINUED | OUTPATIENT
Start: 2021-01-26 | End: 2021-01-27 | Stop reason: SDUPTHER

## 2021-01-26 RX ORDER — SUCCINYLCHOLINE CHLORIDE 20 MG/ML
INJECTION INTRAMUSCULAR; INTRAVENOUS AS NEEDED
Status: DISCONTINUED | OUTPATIENT
Start: 2021-01-26 | End: 2021-01-26 | Stop reason: SURG

## 2021-01-26 RX ORDER — SODIUM CHLORIDE 0.9 % (FLUSH) 0.9 %
10 SYRINGE (ML) INJECTION AS NEEDED
Status: DISCONTINUED | OUTPATIENT
Start: 2021-01-26 | End: 2021-02-04 | Stop reason: HOSPADM

## 2021-01-26 RX ORDER — HALOPERIDOL 5 MG/ML
2 INJECTION INTRAMUSCULAR EVERY 6 HOURS PRN
Status: DISCONTINUED | OUTPATIENT
Start: 2021-01-26 | End: 2021-01-29

## 2021-01-26 RX ORDER — MIDAZOLAM HYDROCHLORIDE 1 MG/ML
2 INJECTION INTRAMUSCULAR; INTRAVENOUS ONCE
Status: COMPLETED | OUTPATIENT
Start: 2021-01-26 | End: 2021-01-26

## 2021-01-26 RX ORDER — ROCURONIUM BROMIDE 10 MG/ML
INJECTION, SOLUTION INTRAVENOUS AS NEEDED
Status: DISCONTINUED | OUTPATIENT
Start: 2021-01-26 | End: 2021-01-26 | Stop reason: SURG

## 2021-01-26 RX ORDER — PROPOFOL 10 MG/ML
VIAL (ML) INTRAVENOUS AS NEEDED
Status: DISCONTINUED | OUTPATIENT
Start: 2021-01-26 | End: 2021-01-26 | Stop reason: SURG

## 2021-01-26 RX ORDER — SODIUM CHLORIDE 0.9 % (FLUSH) 0.9 %
10 SYRINGE (ML) INJECTION EVERY 12 HOURS SCHEDULED
Status: DISCONTINUED | OUTPATIENT
Start: 2021-01-26 | End: 2021-02-04 | Stop reason: HOSPADM

## 2021-01-26 RX ORDER — HEPARIN SODIUM 5000 [USP'U]/ML
5000 INJECTION, SOLUTION INTRAVENOUS; SUBCUTANEOUS EVERY 8 HOURS SCHEDULED
Status: DISCONTINUED | OUTPATIENT
Start: 2021-01-26 | End: 2021-02-04 | Stop reason: HOSPADM

## 2021-01-26 RX ORDER — LIDOCAINE HYDROCHLORIDE 10 MG/ML
INJECTION, SOLUTION INFILTRATION; PERINEURAL AS NEEDED
Status: DISCONTINUED | OUTPATIENT
Start: 2021-01-26 | End: 2021-01-26 | Stop reason: SURG

## 2021-01-26 RX ORDER — FENTANYL CITRATE 50 UG/ML
50 INJECTION, SOLUTION INTRAMUSCULAR; INTRAVENOUS
Status: DISCONTINUED | OUTPATIENT
Start: 2021-01-26 | End: 2021-01-26 | Stop reason: HOSPADM

## 2021-01-26 RX ORDER — FENTANYL CITRATE 50 UG/ML
INJECTION, SOLUTION INTRAMUSCULAR; INTRAVENOUS AS NEEDED
Status: DISCONTINUED | OUTPATIENT
Start: 2021-01-26 | End: 2021-01-26 | Stop reason: SURG

## 2021-01-26 RX ORDER — SODIUM CHLORIDE, SODIUM LACTATE, POTASSIUM CHLORIDE, CALCIUM CHLORIDE 600; 310; 30; 20 MG/100ML; MG/100ML; MG/100ML; MG/100ML
9 INJECTION, SOLUTION INTRAVENOUS CONTINUOUS
Status: DISCONTINUED | OUTPATIENT
Start: 2021-01-26 | End: 2021-01-27

## 2021-01-26 RX ADMIN — SMOFLIPID 200 ML: 6; 6; 5; 3 INJECTION, EMULSION INTRAVENOUS at 23:28

## 2021-01-26 RX ADMIN — ONDANSETRON 4 MG: 2 INJECTION INTRAMUSCULAR; INTRAVENOUS at 08:36

## 2021-01-26 RX ADMIN — HYDROMORPHONE HYDROCHLORIDE 0.5 MG: 1 INJECTION, SOLUTION INTRAMUSCULAR; INTRAVENOUS; SUBCUTANEOUS at 01:13

## 2021-01-26 RX ADMIN — MIDAZOLAM 2 MG: 1 INJECTION INTRAMUSCULAR; INTRAVENOUS at 22:19

## 2021-01-26 RX ADMIN — FENTANYL CITRATE 50 MCG: 50 INJECTION, SOLUTION INTRAMUSCULAR; INTRAVENOUS at 22:22

## 2021-01-26 RX ADMIN — LIDOCAINE HYDROCHLORIDE 50 MG: 10 INJECTION, SOLUTION INFILTRATION; PERINEURAL at 20:08

## 2021-01-26 RX ADMIN — HEPARIN SODIUM 5000 UNITS: 5000 INJECTION, SOLUTION INTRAVENOUS; SUBCUTANEOUS at 08:36

## 2021-01-26 RX ADMIN — TAZOBACTAM SODIUM AND PIPERACILLIN SODIUM 3.38 G: 375; 3 INJECTION, SOLUTION INTRAVENOUS at 00:33

## 2021-01-26 RX ADMIN — ROCURONIUM BROMIDE 10 MG: 10 INJECTION INTRAVENOUS at 21:12

## 2021-01-26 RX ADMIN — POTASSIUM PHOSPHATE, MONOBASIC POTASSIUM PHOSPHATE, DIBASIC: 224; 236 INJECTION, SOLUTION, CONCENTRATE INTRAVENOUS at 23:27

## 2021-01-26 RX ADMIN — Medication 140 MG: at 20:08

## 2021-01-26 RX ADMIN — HYDROMORPHONE HYDROCHLORIDE 0.5 MG: 1 INJECTION, SOLUTION INTRAMUSCULAR; INTRAVENOUS; SUBCUTANEOUS at 05:59

## 2021-01-26 RX ADMIN — SODIUM CHLORIDE, PRESERVATIVE FREE 10 ML: 5 INJECTION INTRAVENOUS at 08:22

## 2021-01-26 RX ADMIN — TAMSULOSIN HYDROCHLORIDE 0.4 MG: 0.4 CAPSULE ORAL at 08:21

## 2021-01-26 RX ADMIN — ROCURONIUM BROMIDE 5 MG: 10 INJECTION INTRAVENOUS at 20:09

## 2021-01-26 RX ADMIN — SODIUM CHLORIDE, POTASSIUM CHLORIDE, SODIUM LACTATE AND CALCIUM CHLORIDE 100 ML/HR: 600; 310; 30; 20 INJECTION, SOLUTION INTRAVENOUS at 08:22

## 2021-01-26 RX ADMIN — DEXAMETHASONE SODIUM PHOSPHATE 4 MG: 4 INJECTION, SOLUTION INTRA-ARTICULAR; INTRALESIONAL; INTRAMUSCULAR; INTRAVENOUS; SOFT TISSUE at 20:08

## 2021-01-26 RX ADMIN — HYDROMORPHONE HYDROCHLORIDE 0.5 MG: 1 INJECTION, SOLUTION INTRAMUSCULAR; INTRAVENOUS; SUBCUTANEOUS at 08:21

## 2021-01-26 RX ADMIN — IOPAMIDOL 100 ML: 755 INJECTION, SOLUTION INTRAVENOUS at 05:44

## 2021-01-26 RX ADMIN — GLYCOPYRROLATE 0.4 MG: 0.4 INJECTION INTRAMUSCULAR; INTRAVENOUS at 21:37

## 2021-01-26 RX ADMIN — SODIUM CHLORIDE, POTASSIUM CHLORIDE, SODIUM LACTATE AND CALCIUM CHLORIDE 1000 ML: 600; 310; 30; 20 INJECTION, SOLUTION INTRAVENOUS at 08:22

## 2021-01-26 RX ADMIN — DEXAMETHASONE SODIUM PHOSPHATE 4 MG: 4 INJECTION, SOLUTION INTRA-ARTICULAR; INTRALESIONAL; INTRAMUSCULAR; INTRAVENOUS; SOFT TISSUE at 21:16

## 2021-01-26 RX ADMIN — PROPOFOL 180 MG: 10 INJECTION, EMULSION INTRAVENOUS at 20:08

## 2021-01-26 RX ADMIN — SODIUM CHLORIDE, POTASSIUM CHLORIDE, SODIUM LACTATE AND CALCIUM CHLORIDE 9 ML/HR: 600; 310; 30; 20 INJECTION, SOLUTION INTRAVENOUS at 18:46

## 2021-01-26 RX ADMIN — DAPTOMYCIN 400 MG: 500 INJECTION, POWDER, LYOPHILIZED, FOR SOLUTION INTRAVENOUS at 11:15

## 2021-01-26 RX ADMIN — SODIUM CHLORIDE, PRESERVATIVE FREE 10 ML: 5 INJECTION INTRAVENOUS at 16:12

## 2021-01-26 RX ADMIN — TAZOBACTAM SODIUM AND PIPERACILLIN SODIUM 3.38 G: 375; 3 INJECTION, SOLUTION INTRAVENOUS at 23:28

## 2021-01-26 RX ADMIN — ROCURONIUM BROMIDE 10 MG: 10 INJECTION INTRAVENOUS at 20:38

## 2021-01-26 RX ADMIN — TAZOBACTAM SODIUM AND PIPERACILLIN SODIUM 3.38 G: 375; 3 INJECTION, SOLUTION INTRAVENOUS at 06:01

## 2021-01-26 RX ADMIN — HYDROMORPHONE HYDROCHLORIDE 0.5 MG: 1 INJECTION, SOLUTION INTRAMUSCULAR; INTRAVENOUS; SUBCUTANEOUS at 18:54

## 2021-01-26 RX ADMIN — ESCITALOPRAM OXALATE 10 MG: 10 TABLET ORAL at 08:21

## 2021-01-26 RX ADMIN — HYDROMORPHONE HYDROCHLORIDE 0.5 MG: 1 INJECTION, SOLUTION INTRAMUSCULAR; INTRAVENOUS; SUBCUTANEOUS at 23:59

## 2021-01-26 RX ADMIN — MICAFUNGIN SODIUM 100 MG: 100 INJECTION, POWDER, LYOPHILIZED, FOR SOLUTION INTRAVENOUS at 09:52

## 2021-01-26 RX ADMIN — SODIUM CHLORIDE, PRESERVATIVE FREE 10 ML: 5 INJECTION INTRAVENOUS at 11:19

## 2021-01-26 RX ADMIN — SODIUM CHLORIDE, POTASSIUM CHLORIDE, SODIUM LACTATE AND CALCIUM CHLORIDE: 600; 310; 30; 20 INJECTION, SOLUTION INTRAVENOUS at 21:25

## 2021-01-26 RX ADMIN — FENTANYL CITRATE 100 MCG: 50 INJECTION, SOLUTION INTRAMUSCULAR; INTRAVENOUS at 20:08

## 2021-01-26 RX ADMIN — SODIUM CHLORIDE, POTASSIUM CHLORIDE, SODIUM LACTATE AND CALCIUM CHLORIDE: 600; 310; 30; 20 INJECTION, SOLUTION INTRAVENOUS at 21:05

## 2021-01-26 RX ADMIN — FAMOTIDINE 20 MG: 10 INJECTION, SOLUTION INTRAVENOUS at 18:46

## 2021-01-26 RX ADMIN — HYDROMORPHONE HYDROCHLORIDE 0.5 MG: 1 INJECTION, SOLUTION INTRAMUSCULAR; INTRAVENOUS; SUBCUTANEOUS at 13:39

## 2021-01-26 RX ADMIN — TAZOBACTAM SODIUM AND PIPERACILLIN SODIUM 3.38 G: 375; 3 INJECTION, SOLUTION INTRAVENOUS at 13:36

## 2021-01-26 RX ADMIN — ROCURONIUM BROMIDE 10 MG: 10 INJECTION INTRAVENOUS at 20:44

## 2021-01-26 RX ADMIN — ONDANSETRON 4 MG: 2 INJECTION INTRAMUSCULAR; INTRAVENOUS at 21:16

## 2021-01-26 RX ADMIN — HYDROMORPHONE HYDROCHLORIDE 0.5 MG: 1 INJECTION, SOLUTION INTRAMUSCULAR; INTRAVENOUS; SUBCUTANEOUS at 16:12

## 2021-01-26 RX ADMIN — FENTANYL CITRATE 50 MCG: 50 INJECTION, SOLUTION INTRAMUSCULAR; INTRAVENOUS at 22:21

## 2021-01-26 RX ADMIN — NEOSTIGMINE 3 MG: 1 INJECTION INTRAVENOUS at 21:37

## 2021-01-26 RX ADMIN — TAZOBACTAM SODIUM AND PIPERACILLIN SODIUM 3.38 G: 375; 3 INJECTION, SOLUTION INTRAVENOUS at 21:16

## 2021-01-26 RX ADMIN — HYDROMORPHONE HYDROCHLORIDE 0.5 MG: 1 INJECTION, SOLUTION INTRAMUSCULAR; INTRAVENOUS; SUBCUTANEOUS at 11:15

## 2021-01-26 RX ADMIN — SODIUM CHLORIDE, POTASSIUM CHLORIDE, SODIUM LACTATE AND CALCIUM CHLORIDE: 600; 310; 30; 20 INJECTION, SOLUTION INTRAVENOUS at 20:08

## 2021-01-26 NOTE — ANESTHESIA PREPROCEDURE EVALUATION
Anesthesia Evaluation     Patient summary reviewed and Nursing notes reviewed   no history of anesthetic complications:  NPO Solid Status: > 8 hours  NPO Liquid Status: > 2 hours           Airway   Mallampati: II  TM distance: >3 FB  Neck ROM: full  No difficulty expected  Dental - normal exam     Pulmonary - negative pulmonary ROS and normal exam    breath sounds clear to auscultation  Cardiovascular - normal exam    ECG reviewed  Rhythm: regular  Rate: normal        Neuro/Psych- negative ROS  GI/Hepatic/Renal/Endo      ROS Comment: Ulcerative colitis s/p proctocolectomy, now with abscess    Musculoskeletal (-) negative ROS    Abdominal    Substance History      OB/GYN          Other - negative ROS                       Anesthesia Plan    ASA 2     general     intravenous induction     Anesthetic plan, all risks, benefits, and alternatives have been provided, discussed and informed consent has been obtained with: patient.

## 2021-01-27 PROBLEM — Z98.890 S/P LAPAROTOMY: Status: ACTIVE | Noted: 2021-01-27

## 2021-01-27 LAB
ALBUMIN SERPL-MCNC: 2.5 G/DL (ref 3.5–5.2)
ALBUMIN/GLOB SERPL: 0.8 G/DL
ALP SERPL-CCNC: 65 U/L (ref 39–117)
ALT SERPL W P-5'-P-CCNC: 17 U/L (ref 1–41)
ANION GAP SERPL CALCULATED.3IONS-SCNC: 11 MMOL/L (ref 5–15)
AST SERPL-CCNC: 23 U/L (ref 1–40)
BASOPHILS # BLD AUTO: 0.02 10*3/MM3 (ref 0–0.2)
BASOPHILS NFR BLD AUTO: 0.1 % (ref 0–1.5)
BILIRUB SERPL-MCNC: 0.9 MG/DL (ref 0–1.2)
BUN SERPL-MCNC: 10 MG/DL (ref 6–20)
BUN/CREAT SERPL: 16.1 (ref 7–25)
CA-I SERPL ISE-MCNC: 1.24 MMOL/L (ref 1.12–1.32)
CALCIUM SPEC-SCNC: 8.2 MG/DL (ref 8.6–10.5)
CHLORIDE SERPL-SCNC: 98 MMOL/L (ref 98–107)
CO2 SERPL-SCNC: 25 MMOL/L (ref 22–29)
CREAT SERPL-MCNC: 0.62 MG/DL (ref 0.76–1.27)
DEPRECATED RDW RBC AUTO: 44.5 FL (ref 37–54)
EOSINOPHIL # BLD AUTO: 0 10*3/MM3 (ref 0–0.4)
EOSINOPHIL NFR BLD AUTO: 0 % (ref 0.3–6.2)
ERYTHROCYTE [DISTWIDTH] IN BLOOD BY AUTOMATED COUNT: 12.9 % (ref 12.3–15.4)
GFR SERPL CREATININE-BSD FRML MDRD: 134 ML/MIN/1.73
GLOBULIN UR ELPH-MCNC: 3.2 GM/DL
GLUCOSE BLDC GLUCOMTR-MCNC: 172 MG/DL (ref 70–130)
GLUCOSE BLDC GLUCOMTR-MCNC: 184 MG/DL (ref 70–130)
GLUCOSE BLDC GLUCOMTR-MCNC: 202 MG/DL (ref 70–130)
GLUCOSE SERPL-MCNC: 222 MG/DL (ref 65–99)
HCT VFR BLD AUTO: 29.7 % (ref 37.5–51)
HGB BLD-MCNC: 9.7 G/DL (ref 13–17.7)
IMM GRANULOCYTES # BLD AUTO: 0.25 10*3/MM3 (ref 0–0.05)
IMM GRANULOCYTES NFR BLD AUTO: 1.3 % (ref 0–0.5)
LYMPHOCYTES # BLD AUTO: 0.93 10*3/MM3 (ref 0.7–3.1)
LYMPHOCYTES NFR BLD AUTO: 4.7 % (ref 19.6–45.3)
MAGNESIUM SERPL-MCNC: 2.2 MG/DL (ref 1.6–2.6)
MCH RBC QN AUTO: 30.9 PG (ref 26.6–33)
MCHC RBC AUTO-ENTMCNC: 32.7 G/DL (ref 31.5–35.7)
MCV RBC AUTO: 94.6 FL (ref 79–97)
MONOCYTES # BLD AUTO: 1.26 10*3/MM3 (ref 0.1–0.9)
MONOCYTES NFR BLD AUTO: 6.4 % (ref 5–12)
NEUTROPHILS NFR BLD AUTO: 17.21 10*3/MM3 (ref 1.7–7)
NEUTROPHILS NFR BLD AUTO: 87.5 % (ref 42.7–76)
NRBC BLD AUTO-RTO: 0 /100 WBC (ref 0–0.2)
PHOSPHATE SERPL-MCNC: 2.8 MG/DL (ref 2.5–4.5)
PLATELET # BLD AUTO: 459 10*3/MM3 (ref 140–450)
PMV BLD AUTO: 8.6 FL (ref 6–12)
POTASSIUM SERPL-SCNC: 4.7 MMOL/L (ref 3.5–5.2)
PROT SERPL-MCNC: 5.7 G/DL (ref 6–8.5)
RBC # BLD AUTO: 3.14 10*6/MM3 (ref 4.14–5.8)
SODIUM SERPL-SCNC: 134 MMOL/L (ref 136–145)
WBC # BLD AUTO: 19.67 10*3/MM3 (ref 3.4–10.8)

## 2021-01-27 PROCEDURE — 25010000002 HYDROMORPHONE PER 4 MG: Performed by: COLON & RECTAL SURGERY

## 2021-01-27 PROCEDURE — 25010000002 MICAFUNGIN SODIUM 100 MG RECONSTITUTED SOLUTION: Performed by: INTERNAL MEDICINE

## 2021-01-27 PROCEDURE — 25010000002 MAGNESIUM SULFATE PER 500 MG OF MAGNESIUM

## 2021-01-27 PROCEDURE — 25010000002 PIPERACILLIN SOD-TAZOBACTAM PER 1 G: Performed by: COLON & RECTAL SURGERY

## 2021-01-27 PROCEDURE — 25010000002 POTASSIUM CHLORIDE PER 2 MEQ OF POTASSIUM

## 2021-01-27 PROCEDURE — 63710000001 INSULIN REGULAR HUMAN PER 5 UNITS

## 2021-01-27 PROCEDURE — 25010000002 CALCIUM GLUCONATE PER 10 ML

## 2021-01-27 PROCEDURE — 25010000002 HEPARIN (PORCINE) PER 1000 UNITS: Performed by: COLON & RECTAL SURGERY

## 2021-01-27 PROCEDURE — 82962 GLUCOSE BLOOD TEST: CPT

## 2021-01-27 PROCEDURE — 80053 COMPREHEN METABOLIC PANEL: CPT | Performed by: COLON & RECTAL SURGERY

## 2021-01-27 PROCEDURE — 84100 ASSAY OF PHOSPHORUS: CPT | Performed by: COLON & RECTAL SURGERY

## 2021-01-27 PROCEDURE — 82330 ASSAY OF CALCIUM: CPT | Performed by: COLON & RECTAL SURGERY

## 2021-01-27 PROCEDURE — 85025 COMPLETE CBC W/AUTO DIFF WBC: CPT | Performed by: COLON & RECTAL SURGERY

## 2021-01-27 PROCEDURE — 83735 ASSAY OF MAGNESIUM: CPT | Performed by: COLON & RECTAL SURGERY

## 2021-01-27 PROCEDURE — 25010000002 HALOPERIDOL LACTATE PER 5 MG: Performed by: INTERNAL MEDICINE

## 2021-01-27 RX ORDER — DIAZEPAM 5 MG/1
5 TABLET ORAL EVERY 6 HOURS PRN
Status: DISPENSED | OUTPATIENT
Start: 2021-01-27 | End: 2021-02-03

## 2021-01-27 RX ADMIN — ZOLPIDEM TARTRATE 10 MG: 5 TABLET, FILM COATED ORAL at 20:32

## 2021-01-27 RX ADMIN — TAZOBACTAM SODIUM AND PIPERACILLIN SODIUM 3.38 G: 375; 3 INJECTION, SOLUTION INTRAVENOUS at 13:43

## 2021-01-27 RX ADMIN — HYDROMORPHONE HYDROCHLORIDE 0.5 MG: 1 INJECTION, SOLUTION INTRAMUSCULAR; INTRAVENOUS; SUBCUTANEOUS at 20:32

## 2021-01-27 RX ADMIN — HYDROMORPHONE HYDROCHLORIDE 0.5 MG: 1 INJECTION, SOLUTION INTRAMUSCULAR; INTRAVENOUS; SUBCUTANEOUS at 03:34

## 2021-01-27 RX ADMIN — POTASSIUM PHOSPHATE, MONOBASIC POTASSIUM PHOSPHATE, DIBASIC: 224; 236 INJECTION, SOLUTION, CONCENTRATE INTRAVENOUS at 17:29

## 2021-01-27 RX ADMIN — TAMSULOSIN HYDROCHLORIDE 0.4 MG: 0.4 CAPSULE ORAL at 08:15

## 2021-01-27 RX ADMIN — TAZOBACTAM SODIUM AND PIPERACILLIN SODIUM 3.38 G: 375; 3 INJECTION, SOLUTION INTRAVENOUS at 20:33

## 2021-01-27 RX ADMIN — HYDROMORPHONE HYDROCHLORIDE 0.5 MG: 1 INJECTION, SOLUTION INTRAMUSCULAR; INTRAVENOUS; SUBCUTANEOUS at 01:50

## 2021-01-27 RX ADMIN — DIAZEPAM 5 MG: 5 TABLET ORAL at 18:01

## 2021-01-27 RX ADMIN — ESCITALOPRAM OXALATE 10 MG: 10 TABLET ORAL at 08:15

## 2021-01-27 RX ADMIN — HALOPERIDOL LACTATE 2 MG: 5 INJECTION, SOLUTION INTRAMUSCULAR at 01:50

## 2021-01-27 RX ADMIN — INSULIN HUMAN 2 UNITS: 100 INJECTION, SOLUTION PARENTERAL at 13:48

## 2021-01-27 RX ADMIN — HYDROMORPHONE HYDROCHLORIDE 0.5 MG: 1 INJECTION, SOLUTION INTRAMUSCULAR; INTRAVENOUS; SUBCUTANEOUS at 23:37

## 2021-01-27 RX ADMIN — HYDROMORPHONE HYDROCHLORIDE 0.5 MG: 1 INJECTION, SOLUTION INTRAMUSCULAR; INTRAVENOUS; SUBCUTANEOUS at 05:32

## 2021-01-27 RX ADMIN — HEPARIN SODIUM 5000 UNITS: 5000 INJECTION, SOLUTION INTRAVENOUS; SUBCUTANEOUS at 13:42

## 2021-01-27 RX ADMIN — TAZOBACTAM SODIUM AND PIPERACILLIN SODIUM 3.38 G: 375; 3 INJECTION, SOLUTION INTRAVENOUS at 05:32

## 2021-01-27 RX ADMIN — MICAFUNGIN SODIUM 100 MG: 100 INJECTION, POWDER, LYOPHILIZED, FOR SOLUTION INTRAVENOUS at 13:43

## 2021-01-27 RX ADMIN — HYDROMORPHONE HYDROCHLORIDE 0.5 MG: 1 INJECTION, SOLUTION INTRAMUSCULAR; INTRAVENOUS; SUBCUTANEOUS at 08:14

## 2021-01-27 RX ADMIN — HYDROMORPHONE HYDROCHLORIDE 0.5 MG: 1 INJECTION, SOLUTION INTRAMUSCULAR; INTRAVENOUS; SUBCUTANEOUS at 16:17

## 2021-01-27 RX ADMIN — TAMSULOSIN HYDROCHLORIDE 0.4 MG: 0.4 CAPSULE ORAL at 20:33

## 2021-01-27 RX ADMIN — SMOFLIPID 200 ML: 6; 6; 5; 3 INJECTION, EMULSION INTRAVENOUS at 17:29

## 2021-01-27 RX ADMIN — DIAZEPAM 5 MG: 5 TABLET ORAL at 23:37

## 2021-01-27 RX ADMIN — HEPARIN SODIUM 5000 UNITS: 5000 INJECTION, SOLUTION INTRAVENOUS; SUBCUTANEOUS at 05:32

## 2021-01-27 RX ADMIN — HEPARIN SODIUM 5000 UNITS: 5000 INJECTION, SOLUTION INTRAVENOUS; SUBCUTANEOUS at 22:16

## 2021-01-27 RX ADMIN — HYDROMORPHONE HYDROCHLORIDE 0.5 MG: 1 INJECTION, SOLUTION INTRAMUSCULAR; INTRAVENOUS; SUBCUTANEOUS at 13:43

## 2021-01-27 RX ADMIN — DIAZEPAM 5 MG: 5 TABLET ORAL at 11:32

## 2021-01-27 NOTE — ANESTHESIA POSTPROCEDURE EVALUATION
Patient: Craig Smalls    Procedure Summary     Date: 01/26/21 [facility]    Anesthesia Start: 2005 Anesthesia Stop:     Procedure: SURGICAL EXPLORATION AND DRAIN PLACEMENT, LYSIS OF ADHESIONS (N/A Abdomen) Diagnosis:     Surgeon: Silverio Montero MD Provider: Kailash Ortega MD    Anesthesia Type: general ASA Status: 2          Anesthesia Type: general    Vitals  No vitals data found for the desired time range.          Post Anesthesia Care and Evaluation    Patient location during evaluation: PACU  Patient participation: complete - patient participated  Level of consciousness: awake and alert  Pain management: adequate  Airway patency: patent  Anesthetic complications: No anesthetic complications  PONV Status: none  Cardiovascular status: hemodynamically stable and acceptable  Respiratory status: nonlabored ventilation, acceptable and nasal cannula  Hydration status: acceptable

## 2021-01-28 LAB
ALBUMIN SERPL-MCNC: 2.4 G/DL (ref 3.5–5.2)
ALBUMIN/GLOB SERPL: 0.8 G/DL
ALP SERPL-CCNC: 71 U/L (ref 39–117)
ALT SERPL W P-5'-P-CCNC: 28 U/L (ref 1–41)
ANION GAP SERPL CALCULATED.3IONS-SCNC: 7 MMOL/L (ref 5–15)
AST SERPL-CCNC: 45 U/L (ref 1–40)
BILIRUB SERPL-MCNC: 0.5 MG/DL (ref 0–1.2)
BUN SERPL-MCNC: 13 MG/DL (ref 6–20)
BUN/CREAT SERPL: 22.4 (ref 7–25)
CA-I SERPL ISE-MCNC: 1.23 MMOL/L (ref 1.12–1.32)
CALCIUM SPEC-SCNC: 8.7 MG/DL (ref 8.6–10.5)
CHLORIDE SERPL-SCNC: 99 MMOL/L (ref 98–107)
CK SERPL-CCNC: 319 U/L (ref 20–200)
CO2 SERPL-SCNC: 28 MMOL/L (ref 22–29)
CREAT SERPL-MCNC: 0.58 MG/DL (ref 0.76–1.27)
DEPRECATED RDW RBC AUTO: 45.3 FL (ref 37–54)
ERYTHROCYTE [DISTWIDTH] IN BLOOD BY AUTOMATED COUNT: 12.7 % (ref 12.3–15.4)
GFR SERPL CREATININE-BSD FRML MDRD: 144 ML/MIN/1.73
GLOBULIN UR ELPH-MCNC: 3.1 GM/DL
GLUCOSE BLDC GLUCOMTR-MCNC: 143 MG/DL (ref 70–130)
GLUCOSE BLDC GLUCOMTR-MCNC: 143 MG/DL (ref 70–130)
GLUCOSE BLDC GLUCOMTR-MCNC: 148 MG/DL (ref 70–130)
GLUCOSE BLDC GLUCOMTR-MCNC: 149 MG/DL (ref 70–130)
GLUCOSE SERPL-MCNC: 168 MG/DL (ref 65–99)
HCT VFR BLD AUTO: 28.4 % (ref 37.5–51)
HGB BLD-MCNC: 9.1 G/DL (ref 13–17.7)
MAGNESIUM SERPL-MCNC: 2.4 MG/DL (ref 1.6–2.6)
MCH RBC QN AUTO: 31.4 PG (ref 26.6–33)
MCHC RBC AUTO-ENTMCNC: 32 G/DL (ref 31.5–35.7)
MCV RBC AUTO: 97.9 FL (ref 79–97)
PHOSPHATE SERPL-MCNC: 2.9 MG/DL (ref 2.5–4.5)
PLATELET # BLD AUTO: 493 10*3/MM3 (ref 140–450)
PMV BLD AUTO: 8.9 FL (ref 6–12)
POTASSIUM SERPL-SCNC: 4.2 MMOL/L (ref 3.5–5.2)
PROT SERPL-MCNC: 5.5 G/DL (ref 6–8.5)
RBC # BLD AUTO: 2.9 10*6/MM3 (ref 4.14–5.8)
SODIUM SERPL-SCNC: 134 MMOL/L (ref 136–145)
WBC # BLD AUTO: 17.16 10*3/MM3 (ref 3.4–10.8)

## 2021-01-28 PROCEDURE — 25010000002 PIPERACILLIN SOD-TAZOBACTAM PER 1 G: Performed by: COLON & RECTAL SURGERY

## 2021-01-28 PROCEDURE — 25010000002 POTASSIUM CHLORIDE PER 2 MEQ OF POTASSIUM

## 2021-01-28 PROCEDURE — 80053 COMPREHEN METABOLIC PANEL: CPT | Performed by: COLON & RECTAL SURGERY

## 2021-01-28 PROCEDURE — 25010000002 MAGNESIUM SULFATE PER 500 MG OF MAGNESIUM

## 2021-01-28 PROCEDURE — 83735 ASSAY OF MAGNESIUM: CPT | Performed by: COLON & RECTAL SURGERY

## 2021-01-28 PROCEDURE — 25010000002 HYDROMORPHONE PER 4 MG: Performed by: COLON & RECTAL SURGERY

## 2021-01-28 PROCEDURE — 82550 ASSAY OF CK (CPK): CPT

## 2021-01-28 PROCEDURE — 25010000002 HEPARIN (PORCINE) PER 1000 UNITS: Performed by: COLON & RECTAL SURGERY

## 2021-01-28 PROCEDURE — 85027 COMPLETE CBC AUTOMATED: CPT | Performed by: INTERNAL MEDICINE

## 2021-01-28 PROCEDURE — 82962 GLUCOSE BLOOD TEST: CPT

## 2021-01-28 PROCEDURE — 84100 ASSAY OF PHOSPHORUS: CPT | Performed by: COLON & RECTAL SURGERY

## 2021-01-28 PROCEDURE — 25010000002 CALCIUM GLUCONATE PER 10 ML

## 2021-01-28 PROCEDURE — 25010000002 DAPTOMYCIN PER 1 MG: Performed by: INTERNAL MEDICINE

## 2021-01-28 PROCEDURE — 25010000002 MICAFUNGIN SODIUM 100 MG RECONSTITUTED SOLUTION: Performed by: INTERNAL MEDICINE

## 2021-01-28 PROCEDURE — 82330 ASSAY OF CALCIUM: CPT | Performed by: COLON & RECTAL SURGERY

## 2021-01-28 RX ORDER — TEMAZEPAM 15 MG/1
15 CAPSULE ORAL NIGHTLY PRN
Status: DISCONTINUED | OUTPATIENT
Start: 2021-01-28 | End: 2021-02-04 | Stop reason: HOSPADM

## 2021-01-28 RX ADMIN — MICAFUNGIN SODIUM 100 MG: 100 INJECTION, POWDER, LYOPHILIZED, FOR SOLUTION INTRAVENOUS at 11:52

## 2021-01-28 RX ADMIN — POTASSIUM PHOSPHATE, MONOBASIC POTASSIUM PHOSPHATE, DIBASIC: 224; 236 INJECTION, SOLUTION, CONCENTRATE INTRAVENOUS at 18:17

## 2021-01-28 RX ADMIN — HYDROMORPHONE HYDROCHLORIDE 0.5 MG: 1 INJECTION, SOLUTION INTRAMUSCULAR; INTRAVENOUS; SUBCUTANEOUS at 03:29

## 2021-01-28 RX ADMIN — DIAZEPAM 5 MG: 5 TABLET ORAL at 05:40

## 2021-01-28 RX ADMIN — HYDROMORPHONE HYDROCHLORIDE 0.5 MG: 1 INJECTION, SOLUTION INTRAMUSCULAR; INTRAVENOUS; SUBCUTANEOUS at 15:55

## 2021-01-28 RX ADMIN — DIAZEPAM 5 MG: 5 TABLET ORAL at 11:55

## 2021-01-28 RX ADMIN — TAMSULOSIN HYDROCHLORIDE 0.4 MG: 0.4 CAPSULE ORAL at 20:27

## 2021-01-28 RX ADMIN — SODIUM CHLORIDE, PRESERVATIVE FREE 10 ML: 5 INJECTION INTRAVENOUS at 08:18

## 2021-01-28 RX ADMIN — HYDROMORPHONE HYDROCHLORIDE 0.5 MG: 1 INJECTION, SOLUTION INTRAMUSCULAR; INTRAVENOUS; SUBCUTANEOUS at 20:27

## 2021-01-28 RX ADMIN — SMOFLIPID 200 ML: 6; 6; 5; 3 INJECTION, EMULSION INTRAVENOUS at 18:16

## 2021-01-28 RX ADMIN — TAZOBACTAM SODIUM AND PIPERACILLIN SODIUM 3.38 G: 375; 3 INJECTION, SOLUTION INTRAVENOUS at 05:40

## 2021-01-28 RX ADMIN — HYDROMORPHONE HYDROCHLORIDE 0.5 MG: 1 INJECTION, SOLUTION INTRAMUSCULAR; INTRAVENOUS; SUBCUTANEOUS at 23:24

## 2021-01-28 RX ADMIN — HYDROMORPHONE HYDROCHLORIDE 0.5 MG: 1 INJECTION, SOLUTION INTRAMUSCULAR; INTRAVENOUS; SUBCUTANEOUS at 05:41

## 2021-01-28 RX ADMIN — ACETAMINOPHEN 650 MG: 325 TABLET, FILM COATED ORAL at 12:59

## 2021-01-28 RX ADMIN — DAPTOMYCIN 400 MG: 500 INJECTION, POWDER, LYOPHILIZED, FOR SOLUTION INTRAVENOUS at 11:13

## 2021-01-28 RX ADMIN — TAZOBACTAM SODIUM AND PIPERACILLIN SODIUM 3.38 G: 375; 3 INJECTION, SOLUTION INTRAVENOUS at 14:30

## 2021-01-28 RX ADMIN — HYDROMORPHONE HYDROCHLORIDE 0.5 MG: 1 INJECTION, SOLUTION INTRAMUSCULAR; INTRAVENOUS; SUBCUTANEOUS at 08:16

## 2021-01-28 RX ADMIN — HEPARIN SODIUM 5000 UNITS: 5000 INJECTION, SOLUTION INTRAVENOUS; SUBCUTANEOUS at 20:28

## 2021-01-28 RX ADMIN — HEPARIN SODIUM 5000 UNITS: 5000 INJECTION, SOLUTION INTRAVENOUS; SUBCUTANEOUS at 14:31

## 2021-01-28 RX ADMIN — TAZOBACTAM SODIUM AND PIPERACILLIN SODIUM 3.38 G: 375; 3 INJECTION, SOLUTION INTRAVENOUS at 20:28

## 2021-01-28 RX ADMIN — TEMAZEPAM 15 MG: 15 CAPSULE ORAL at 20:27

## 2021-01-28 RX ADMIN — DIAZEPAM 5 MG: 5 TABLET ORAL at 18:02

## 2021-01-28 RX ADMIN — DIAZEPAM 5 MG: 5 TABLET ORAL at 23:24

## 2021-01-28 RX ADMIN — ESCITALOPRAM OXALATE 10 MG: 10 TABLET ORAL at 08:18

## 2021-01-28 RX ADMIN — SODIUM CHLORIDE, PRESERVATIVE FREE 10 ML: 5 INJECTION INTRAVENOUS at 20:31

## 2021-01-28 RX ADMIN — SODIUM CHLORIDE, PRESERVATIVE FREE 10 ML: 5 INJECTION INTRAVENOUS at 08:19

## 2021-01-28 RX ADMIN — HEPARIN SODIUM 5000 UNITS: 5000 INJECTION, SOLUTION INTRAVENOUS; SUBCUTANEOUS at 05:40

## 2021-01-28 RX ADMIN — TAMSULOSIN HYDROCHLORIDE 0.4 MG: 0.4 CAPSULE ORAL at 08:18

## 2021-01-29 ENCOUNTER — APPOINTMENT (OUTPATIENT)
Dept: GENERAL RADIOLOGY | Facility: HOSPITAL | Age: 56
End: 2021-01-29

## 2021-01-29 LAB
ANION GAP SERPL CALCULATED.3IONS-SCNC: 9 MMOL/L (ref 5–15)
BACTERIA FLD CULT: ABNORMAL
BASOPHILS # BLD AUTO: 0.05 10*3/MM3 (ref 0–0.2)
BASOPHILS NFR BLD AUTO: 0.4 % (ref 0–1.5)
BUN SERPL-MCNC: 13 MG/DL (ref 6–20)
BUN/CREAT SERPL: 24.1 (ref 7–25)
CALCIUM SPEC-SCNC: 8.2 MG/DL (ref 8.6–10.5)
CHLORIDE SERPL-SCNC: 102 MMOL/L (ref 98–107)
CO2 SERPL-SCNC: 26 MMOL/L (ref 22–29)
CREAT SERPL-MCNC: 0.54 MG/DL (ref 0.76–1.27)
DEPRECATED RDW RBC AUTO: 44.8 FL (ref 37–54)
EOSINOPHIL # BLD AUTO: 1.38 10*3/MM3 (ref 0–0.4)
EOSINOPHIL NFR BLD AUTO: 10.2 % (ref 0.3–6.2)
ERYTHROCYTE [DISTWIDTH] IN BLOOD BY AUTOMATED COUNT: 12.6 % (ref 12.3–15.4)
GFR SERPL CREATININE-BSD FRML MDRD: >150 ML/MIN/1.73
GLUCOSE BLDC GLUCOMTR-MCNC: 131 MG/DL (ref 70–130)
GLUCOSE BLDC GLUCOMTR-MCNC: 133 MG/DL (ref 70–130)
GLUCOSE BLDC GLUCOMTR-MCNC: 136 MG/DL (ref 70–130)
GLUCOSE SERPL-MCNC: 139 MG/DL (ref 65–99)
GRAM STN SPEC: ABNORMAL
GRAM STN SPEC: ABNORMAL
HCT VFR BLD AUTO: 32.5 % (ref 37.5–51)
HCT VFR BLD AUTO: 32.6 % (ref 37.5–51)
HGB BLD-MCNC: 10.5 G/DL (ref 13–17.7)
HGB BLD-MCNC: 10.6 G/DL (ref 13–17.7)
IMM GRANULOCYTES # BLD AUTO: 0.14 10*3/MM3 (ref 0–0.05)
IMM GRANULOCYTES NFR BLD AUTO: 1 % (ref 0–0.5)
LYMPHOCYTES # BLD AUTO: 2.64 10*3/MM3 (ref 0.7–3.1)
LYMPHOCYTES NFR BLD AUTO: 19.6 % (ref 19.6–45.3)
MAGNESIUM SERPL-MCNC: 2.3 MG/DL (ref 1.6–2.6)
MCH RBC QN AUTO: 31.6 PG (ref 26.6–33)
MCHC RBC AUTO-ENTMCNC: 32.3 G/DL (ref 31.5–35.7)
MCV RBC AUTO: 97.9 FL (ref 79–97)
MONOCYTES # BLD AUTO: 0.79 10*3/MM3 (ref 0.1–0.9)
MONOCYTES NFR BLD AUTO: 5.9 % (ref 5–12)
NEUTROPHILS NFR BLD AUTO: 62.9 % (ref 42.7–76)
NEUTROPHILS NFR BLD AUTO: 8.48 10*3/MM3 (ref 1.7–7)
NRBC BLD AUTO-RTO: 0 /100 WBC (ref 0–0.2)
PHOSPHATE SERPL-MCNC: 3.7 MG/DL (ref 2.5–4.5)
PLATELET # BLD AUTO: 487 10*3/MM3 (ref 140–450)
PMV BLD AUTO: 8.7 FL (ref 6–12)
POTASSIUM SERPL-SCNC: 3.8 MMOL/L (ref 3.5–5.2)
RBC # BLD AUTO: 3.32 10*6/MM3 (ref 4.14–5.8)
SODIUM SERPL-SCNC: 137 MMOL/L (ref 136–145)
WBC # BLD AUTO: 13.48 10*3/MM3 (ref 3.4–10.8)

## 2021-01-29 PROCEDURE — 25010000002 PIPERACILLIN SOD-TAZOBACTAM PER 1 G: Performed by: COLON & RECTAL SURGERY

## 2021-01-29 PROCEDURE — 25010000002 MAGNESIUM SULFATE PER 500 MG OF MAGNESIUM

## 2021-01-29 PROCEDURE — 80048 BASIC METABOLIC PNL TOTAL CA: CPT

## 2021-01-29 PROCEDURE — 25010000002 CALCIUM GLUCONATE PER 10 ML

## 2021-01-29 PROCEDURE — 83735 ASSAY OF MAGNESIUM: CPT

## 2021-01-29 PROCEDURE — 25010000003 AMPICILLIN-SULBACTAM PER 1.5 G: Performed by: INTERNAL MEDICINE

## 2021-01-29 PROCEDURE — 84100 ASSAY OF PHOSPHORUS: CPT

## 2021-01-29 PROCEDURE — 25010000002 MICAFUNGIN SODIUM 100 MG RECONSTITUTED SOLUTION: Performed by: INTERNAL MEDICINE

## 2021-01-29 PROCEDURE — 85014 HEMATOCRIT: CPT | Performed by: INTERNAL MEDICINE

## 2021-01-29 PROCEDURE — 25010000002 HEPARIN (PORCINE) PER 1000 UNITS: Performed by: COLON & RECTAL SURGERY

## 2021-01-29 PROCEDURE — 85025 COMPLETE CBC W/AUTO DIFF WBC: CPT | Performed by: COLON & RECTAL SURGERY

## 2021-01-29 PROCEDURE — 25010000002 POTASSIUM CHLORIDE PER 2 MEQ OF POTASSIUM

## 2021-01-29 PROCEDURE — 82962 GLUCOSE BLOOD TEST: CPT

## 2021-01-29 PROCEDURE — 85018 HEMOGLOBIN: CPT | Performed by: INTERNAL MEDICINE

## 2021-01-29 PROCEDURE — 74018 RADEX ABDOMEN 1 VIEW: CPT

## 2021-01-29 PROCEDURE — 25010000002 HYDROMORPHONE PER 4 MG: Performed by: COLON & RECTAL SURGERY

## 2021-01-29 RX ORDER — CHLORPROMAZINE HYDROCHLORIDE 25 MG/1
25 TABLET, FILM COATED ORAL 3 TIMES DAILY PRN
Status: DISCONTINUED | OUTPATIENT
Start: 2021-01-29 | End: 2021-02-04 | Stop reason: HOSPADM

## 2021-01-29 RX ORDER — PANTOPRAZOLE SODIUM 40 MG/10ML
40 INJECTION, POWDER, LYOPHILIZED, FOR SOLUTION INTRAVENOUS EVERY 12 HOURS SCHEDULED
Status: DISCONTINUED | OUTPATIENT
Start: 2021-01-29 | End: 2021-02-02 | Stop reason: ALTCHOICE

## 2021-01-29 RX ORDER — CALCIUM CARBONATE 750 MG/1
1500 TABLET, CHEWABLE ORAL 3 TIMES DAILY PRN
Status: DISCONTINUED | OUTPATIENT
Start: 2021-01-29 | End: 2021-02-04 | Stop reason: HOSPADM

## 2021-01-29 RX ORDER — AMPICILLIN AND SULBACTAM 2; 1 G/1; G/1
3 INJECTION, POWDER, FOR SOLUTION INTRAMUSCULAR; INTRAVENOUS EVERY 6 HOURS
Status: DISCONTINUED | OUTPATIENT
Start: 2021-01-29 | End: 2021-01-29 | Stop reason: ALTCHOICE

## 2021-01-29 RX ORDER — HYDROCODONE BITARTRATE AND ACETAMINOPHEN 7.5; 325 MG/1; MG/1
1 TABLET ORAL EVERY 6 HOURS PRN
Status: DISCONTINUED | OUTPATIENT
Start: 2021-01-29 | End: 2021-02-04 | Stop reason: HOSPADM

## 2021-01-29 RX ORDER — CHOLECALCIFEROL (VITAMIN D3) 125 MCG
10 CAPSULE ORAL NIGHTLY PRN
Status: DISCONTINUED | OUTPATIENT
Start: 2021-01-29 | End: 2021-02-04 | Stop reason: HOSPADM

## 2021-01-29 RX ADMIN — HEPARIN SODIUM 5000 UNITS: 5000 INJECTION, SOLUTION INTRAVENOUS; SUBCUTANEOUS at 05:26

## 2021-01-29 RX ADMIN — CALCIUM CARBONATE 1500 MG: 750 TABLET ORAL at 13:03

## 2021-01-29 RX ADMIN — MICAFUNGIN SODIUM 100 MG: 100 INJECTION, POWDER, LYOPHILIZED, FOR SOLUTION INTRAVENOUS at 08:49

## 2021-01-29 RX ADMIN — POTASSIUM PHOSPHATE, MONOBASIC POTASSIUM PHOSPHATE, DIBASIC: 224; 236 INJECTION, SOLUTION, CONCENTRATE INTRAVENOUS at 17:18

## 2021-01-29 RX ADMIN — SODIUM CHLORIDE, POTASSIUM CHLORIDE, SODIUM LACTATE AND CALCIUM CHLORIDE 100 ML/HR: 600; 310; 30; 20 INJECTION, SOLUTION INTRAVENOUS at 20:26

## 2021-01-29 RX ADMIN — HYDROMORPHONE HYDROCHLORIDE 0.5 MG: 1 INJECTION, SOLUTION INTRAMUSCULAR; INTRAVENOUS; SUBCUTANEOUS at 01:15

## 2021-01-29 RX ADMIN — TAMSULOSIN HYDROCHLORIDE 0.4 MG: 0.4 CAPSULE ORAL at 22:33

## 2021-01-29 RX ADMIN — Medication 10 MG: at 22:33

## 2021-01-29 RX ADMIN — DIAZEPAM 5 MG: 5 TABLET ORAL at 20:20

## 2021-01-29 RX ADMIN — ESCITALOPRAM OXALATE 10 MG: 10 TABLET ORAL at 08:48

## 2021-01-29 RX ADMIN — HEPARIN SODIUM 5000 UNITS: 5000 INJECTION, SOLUTION INTRAVENOUS; SUBCUTANEOUS at 13:21

## 2021-01-29 RX ADMIN — HYDROMORPHONE HYDROCHLORIDE 0.5 MG: 1 INJECTION, SOLUTION INTRAMUSCULAR; INTRAVENOUS; SUBCUTANEOUS at 05:26

## 2021-01-29 RX ADMIN — HEPARIN SODIUM 5000 UNITS: 5000 INJECTION, SOLUTION INTRAVENOUS; SUBCUTANEOUS at 22:34

## 2021-01-29 RX ADMIN — PANTOPRAZOLE SODIUM 40 MG: 40 INJECTION, POWDER, LYOPHILIZED, FOR SOLUTION INTRAVENOUS at 22:32

## 2021-01-29 RX ADMIN — CHLORPROMAZINE HYDROCHLORIDE 25 MG: 25 TABLET, FILM COATED ORAL at 22:33

## 2021-01-29 RX ADMIN — TEMAZEPAM 15 MG: 15 CAPSULE ORAL at 22:33

## 2021-01-29 RX ADMIN — SODIUM CHLORIDE 3 G: 900 INJECTION INTRAVENOUS at 22:34

## 2021-01-29 RX ADMIN — SODIUM CHLORIDE, POTASSIUM CHLORIDE, SODIUM LACTATE AND CALCIUM CHLORIDE 100 ML/HR: 600; 310; 30; 20 INJECTION, SOLUTION INTRAVENOUS at 01:20

## 2021-01-29 RX ADMIN — TAMSULOSIN HYDROCHLORIDE 0.4 MG: 0.4 CAPSULE ORAL at 08:48

## 2021-01-29 RX ADMIN — SODIUM CHLORIDE 3 G: 900 INJECTION INTRAVENOUS at 17:17

## 2021-01-29 RX ADMIN — SMOFLIPID 200 ML: 6; 6; 5; 3 INJECTION, EMULSION INTRAVENOUS at 17:18

## 2021-01-29 RX ADMIN — HYDROCODONE BITARTRATE AND ACETAMINOPHEN 1 TABLET: 7.5; 325 TABLET ORAL at 06:35

## 2021-01-29 RX ADMIN — HYDROMORPHONE HYDROCHLORIDE 0.5 MG: 1 INJECTION, SOLUTION INTRAMUSCULAR; INTRAVENOUS; SUBCUTANEOUS at 03:01

## 2021-01-29 RX ADMIN — SODIUM CHLORIDE 1000 ML: 9 INJECTION, SOLUTION INTRAVENOUS at 06:35

## 2021-01-29 RX ADMIN — SODIUM CHLORIDE 500 ML: 9 INJECTION, SOLUTION INTRAVENOUS at 18:11

## 2021-01-29 RX ADMIN — HYDROCODONE BITARTRATE AND ACETAMINOPHEN 1 TABLET: 7.5; 325 TABLET ORAL at 13:21

## 2021-01-29 RX ADMIN — HYDROMORPHONE HYDROCHLORIDE 0.5 MG: 1 INJECTION, SOLUTION INTRAMUSCULAR; INTRAVENOUS; SUBCUTANEOUS at 09:05

## 2021-01-29 RX ADMIN — TAZOBACTAM SODIUM AND PIPERACILLIN SODIUM 3.38 G: 375; 3 INJECTION, SOLUTION INTRAVENOUS at 05:26

## 2021-01-29 RX ADMIN — DIAZEPAM 5 MG: 5 TABLET ORAL at 05:25

## 2021-01-29 RX ADMIN — HYDROCODONE BITARTRATE AND ACETAMINOPHEN 1 TABLET: 7.5; 325 TABLET ORAL at 22:33

## 2021-01-29 RX ADMIN — SODIUM CHLORIDE 3 G: 900 INJECTION INTRAVENOUS at 11:08

## 2021-01-29 RX ADMIN — SODIUM CHLORIDE, PRESERVATIVE FREE 10 ML: 5 INJECTION INTRAVENOUS at 22:37

## 2021-01-29 RX ADMIN — SODIUM CHLORIDE, POTASSIUM CHLORIDE, SODIUM LACTATE AND CALCIUM CHLORIDE 100 ML/HR: 600; 310; 30; 20 INJECTION, SOLUTION INTRAVENOUS at 11:03

## 2021-01-30 LAB
ANION GAP SERPL CALCULATED.3IONS-SCNC: 8 MMOL/L (ref 5–15)
BASOPHILS # BLD AUTO: 0.08 10*3/MM3 (ref 0–0.2)
BASOPHILS NFR BLD AUTO: 0.5 % (ref 0–1.5)
BUN SERPL-MCNC: 13 MG/DL (ref 6–20)
BUN/CREAT SERPL: 22.4 (ref 7–25)
CALCIUM SPEC-SCNC: 8.4 MG/DL (ref 8.6–10.5)
CHLORIDE SERPL-SCNC: 100 MMOL/L (ref 98–107)
CO2 SERPL-SCNC: 27 MMOL/L (ref 22–29)
CREAT SERPL-MCNC: 0.58 MG/DL (ref 0.76–1.27)
DEPRECATED RDW RBC AUTO: 45.2 FL (ref 37–54)
EOSINOPHIL # BLD AUTO: 1.05 10*3/MM3 (ref 0–0.4)
EOSINOPHIL NFR BLD AUTO: 6 % (ref 0.3–6.2)
ERYTHROCYTE [DISTWIDTH] IN BLOOD BY AUTOMATED COUNT: 12.7 % (ref 12.3–15.4)
GFR SERPL CREATININE-BSD FRML MDRD: 144 ML/MIN/1.73
GLUCOSE BLDC GLUCOMTR-MCNC: 149 MG/DL (ref 70–130)
GLUCOSE BLDC GLUCOMTR-MCNC: 159 MG/DL (ref 70–130)
GLUCOSE BLDC GLUCOMTR-MCNC: 161 MG/DL (ref 70–130)
GLUCOSE BLDC GLUCOMTR-MCNC: 172 MG/DL (ref 70–130)
GLUCOSE SERPL-MCNC: 136 MG/DL (ref 65–99)
HCT VFR BLD AUTO: 33.1 % (ref 37.5–51)
HGB BLD-MCNC: 10.6 G/DL (ref 13–17.7)
IMM GRANULOCYTES # BLD AUTO: 0.42 10*3/MM3 (ref 0–0.05)
IMM GRANULOCYTES NFR BLD AUTO: 2.4 % (ref 0–0.5)
LYMPHOCYTES # BLD AUTO: 2.6 10*3/MM3 (ref 0.7–3.1)
LYMPHOCYTES NFR BLD AUTO: 15 % (ref 19.6–45.3)
MAGNESIUM SERPL-MCNC: 2.6 MG/DL (ref 1.6–2.6)
MCH RBC QN AUTO: 31.7 PG (ref 26.6–33)
MCHC RBC AUTO-ENTMCNC: 32 G/DL (ref 31.5–35.7)
MCV RBC AUTO: 99.1 FL (ref 79–97)
MONOCYTES # BLD AUTO: 0.98 10*3/MM3 (ref 0.1–0.9)
MONOCYTES NFR BLD AUTO: 5.6 % (ref 5–12)
NEUTROPHILS NFR BLD AUTO: 12.26 10*3/MM3 (ref 1.7–7)
NEUTROPHILS NFR BLD AUTO: 70.5 % (ref 42.7–76)
NRBC BLD AUTO-RTO: 0.1 /100 WBC (ref 0–0.2)
PHOSPHATE SERPL-MCNC: 3 MG/DL (ref 2.5–4.5)
PLATELET # BLD AUTO: 538 10*3/MM3 (ref 140–450)
PMV BLD AUTO: 9 FL (ref 6–12)
POTASSIUM SERPL-SCNC: 3.7 MMOL/L (ref 3.5–5.2)
RBC # BLD AUTO: 3.34 10*6/MM3 (ref 4.14–5.8)
SODIUM SERPL-SCNC: 135 MMOL/L (ref 136–145)
WBC # BLD AUTO: 17.39 10*3/MM3 (ref 3.4–10.8)

## 2021-01-30 PROCEDURE — 85025 COMPLETE CBC W/AUTO DIFF WBC: CPT | Performed by: COLON & RECTAL SURGERY

## 2021-01-30 PROCEDURE — 80048 BASIC METABOLIC PNL TOTAL CA: CPT

## 2021-01-30 PROCEDURE — 25010000002 POTASSIUM CHLORIDE PER 2 MEQ OF POTASSIUM

## 2021-01-30 PROCEDURE — 25010000002 CALCIUM GLUCONATE PER 10 ML

## 2021-01-30 PROCEDURE — 84100 ASSAY OF PHOSPHORUS: CPT

## 2021-01-30 PROCEDURE — 25010000002 HEPARIN (PORCINE) PER 1000 UNITS: Performed by: COLON & RECTAL SURGERY

## 2021-01-30 PROCEDURE — 82962 GLUCOSE BLOOD TEST: CPT

## 2021-01-30 PROCEDURE — 25010000002 MAGNESIUM SULFATE PER 500 MG OF MAGNESIUM

## 2021-01-30 PROCEDURE — 25010000003 AMPICILLIN-SULBACTAM PER 1.5 G: Performed by: INTERNAL MEDICINE

## 2021-01-30 PROCEDURE — 25010000002 MICAFUNGIN SODIUM 100 MG RECONSTITUTED SOLUTION: Performed by: INTERNAL MEDICINE

## 2021-01-30 PROCEDURE — 83735 ASSAY OF MAGNESIUM: CPT

## 2021-01-30 RX ADMIN — SODIUM CHLORIDE, PRESERVATIVE FREE 10 ML: 5 INJECTION INTRAVENOUS at 09:16

## 2021-01-30 RX ADMIN — PANTOPRAZOLE SODIUM 40 MG: 40 INJECTION, POWDER, LYOPHILIZED, FOR SOLUTION INTRAVENOUS at 09:12

## 2021-01-30 RX ADMIN — MICAFUNGIN SODIUM 100 MG: 100 INJECTION, POWDER, LYOPHILIZED, FOR SOLUTION INTRAVENOUS at 09:18

## 2021-01-30 RX ADMIN — TAMSULOSIN HYDROCHLORIDE 0.4 MG: 0.4 CAPSULE ORAL at 09:13

## 2021-01-30 RX ADMIN — HYDROCODONE BITARTRATE AND ACETAMINOPHEN 1 TABLET: 7.5; 325 TABLET ORAL at 06:10

## 2021-01-30 RX ADMIN — HYDROCODONE BITARTRATE AND ACETAMINOPHEN 1 TABLET: 7.5; 325 TABLET ORAL at 15:55

## 2021-01-30 RX ADMIN — CHLORPROMAZINE HYDROCHLORIDE 25 MG: 25 TABLET, FILM COATED ORAL at 16:57

## 2021-01-30 RX ADMIN — SODIUM CHLORIDE 3 G: 900 INJECTION INTRAVENOUS at 17:00

## 2021-01-30 RX ADMIN — SODIUM CHLORIDE, POTASSIUM CHLORIDE, SODIUM LACTATE AND CALCIUM CHLORIDE 100 ML/HR: 600; 310; 30; 20 INJECTION, SOLUTION INTRAVENOUS at 17:00

## 2021-01-30 RX ADMIN — TEMAZEPAM 15 MG: 15 CAPSULE ORAL at 23:58

## 2021-01-30 RX ADMIN — SODIUM CHLORIDE, POTASSIUM CHLORIDE, SODIUM LACTATE AND CALCIUM CHLORIDE 100 ML/HR: 600; 310; 30; 20 INJECTION, SOLUTION INTRAVENOUS at 09:10

## 2021-01-30 RX ADMIN — CHLORPROMAZINE HYDROCHLORIDE 25 MG: 25 TABLET, FILM COATED ORAL at 11:58

## 2021-01-30 RX ADMIN — HEPARIN SODIUM 5000 UNITS: 5000 INJECTION, SOLUTION INTRAVENOUS; SUBCUTANEOUS at 20:57

## 2021-01-30 RX ADMIN — SODIUM CHLORIDE 3 G: 900 INJECTION INTRAVENOUS at 10:54

## 2021-01-30 RX ADMIN — SODIUM CHLORIDE, PRESERVATIVE FREE 10 ML: 5 INJECTION INTRAVENOUS at 20:58

## 2021-01-30 RX ADMIN — POTASSIUM PHOSPHATE, MONOBASIC POTASSIUM PHOSPHATE, DIBASIC: 224; 236 INJECTION, SOLUTION, CONCENTRATE INTRAVENOUS at 17:55

## 2021-01-30 RX ADMIN — SMOFLIPID 200 ML: 6; 6; 5; 3 INJECTION, EMULSION INTRAVENOUS at 17:55

## 2021-01-30 RX ADMIN — TAMSULOSIN HYDROCHLORIDE 0.4 MG: 0.4 CAPSULE ORAL at 20:54

## 2021-01-30 RX ADMIN — SODIUM CHLORIDE 3 G: 900 INJECTION INTRAVENOUS at 23:58

## 2021-01-30 RX ADMIN — HEPARIN SODIUM 5000 UNITS: 5000 INJECTION, SOLUTION INTRAVENOUS; SUBCUTANEOUS at 14:16

## 2021-01-30 RX ADMIN — ESCITALOPRAM OXALATE 10 MG: 10 TABLET ORAL at 09:13

## 2021-01-30 RX ADMIN — HEPARIN SODIUM 5000 UNITS: 5000 INJECTION, SOLUTION INTRAVENOUS; SUBCUTANEOUS at 06:10

## 2021-01-30 RX ADMIN — DIAZEPAM 5 MG: 5 TABLET ORAL at 06:10

## 2021-01-30 RX ADMIN — SODIUM CHLORIDE 3 G: 900 INJECTION INTRAVENOUS at 06:09

## 2021-01-30 RX ADMIN — CHLORPROMAZINE HYDROCHLORIDE 25 MG: 25 TABLET, FILM COATED ORAL at 21:16

## 2021-01-30 RX ADMIN — PANTOPRAZOLE SODIUM 40 MG: 40 INJECTION, POWDER, LYOPHILIZED, FOR SOLUTION INTRAVENOUS at 20:54

## 2021-01-31 LAB
ALBUMIN SERPL-MCNC: 2.9 G/DL (ref 3.5–5.2)
ALBUMIN/GLOB SERPL: 0.9 G/DL
ALP SERPL-CCNC: 91 U/L (ref 39–117)
ALT SERPL W P-5'-P-CCNC: 38 U/L (ref 1–41)
ANION GAP SERPL CALCULATED.3IONS-SCNC: 7 MMOL/L (ref 5–15)
AST SERPL-CCNC: 34 U/L (ref 1–40)
BACTERIA SPEC AEROBE CULT: NORMAL
BACTERIA SPEC AEROBE CULT: NORMAL
BACTERIA SPEC ANAEROBE CULT: NORMAL
BACTERIA UR QL AUTO: ABNORMAL /HPF
BILIRUB SERPL-MCNC: 0.8 MG/DL (ref 0–1.2)
BILIRUB UR QL STRIP: NEGATIVE
BUN SERPL-MCNC: 14 MG/DL (ref 6–20)
BUN/CREAT SERPL: 24.6 (ref 7–25)
CALCIUM SPEC-SCNC: 8.7 MG/DL (ref 8.6–10.5)
CHLORIDE SERPL-SCNC: 103 MMOL/L (ref 98–107)
CLARITY UR: CLEAR
CO2 SERPL-SCNC: 26 MMOL/L (ref 22–29)
COLOR UR: YELLOW
CREAT SERPL-MCNC: 0.57 MG/DL (ref 0.76–1.27)
D-LACTATE SERPL-SCNC: 1.2 MMOL/L (ref 0.5–2)
DEPRECATED RDW RBC AUTO: 46.3 FL (ref 37–54)
ERYTHROCYTE [DISTWIDTH] IN BLOOD BY AUTOMATED COUNT: 13.1 % (ref 12.3–15.4)
GFR SERPL CREATININE-BSD FRML MDRD: 147 ML/MIN/1.73
GLOBULIN UR ELPH-MCNC: 3.1 GM/DL
GLUCOSE BLDC GLUCOMTR-MCNC: 115 MG/DL (ref 70–130)
GLUCOSE BLDC GLUCOMTR-MCNC: 132 MG/DL (ref 70–130)
GLUCOSE BLDC GLUCOMTR-MCNC: 133 MG/DL (ref 70–130)
GLUCOSE BLDC GLUCOMTR-MCNC: 141 MG/DL (ref 70–130)
GLUCOSE BLDC GLUCOMTR-MCNC: 174 MG/DL (ref 70–130)
GLUCOSE SERPL-MCNC: 103 MG/DL (ref 65–99)
GLUCOSE UR STRIP-MCNC: NEGATIVE MG/DL
HCT VFR BLD AUTO: 34 % (ref 37.5–51)
HGB BLD-MCNC: 10.8 G/DL (ref 13–17.7)
HGB UR QL STRIP.AUTO: ABNORMAL
HYALINE CASTS UR QL AUTO: ABNORMAL /LPF
KETONES UR QL STRIP: NEGATIVE
LEUKOCYTE ESTERASE UR QL STRIP.AUTO: NEGATIVE
MAGNESIUM SERPL-MCNC: 2.4 MG/DL (ref 1.6–2.6)
MCH RBC QN AUTO: 31 PG (ref 26.6–33)
MCHC RBC AUTO-ENTMCNC: 31.8 G/DL (ref 31.5–35.7)
MCV RBC AUTO: 97.7 FL (ref 79–97)
NITRITE UR QL STRIP: NEGATIVE
PH UR STRIP.AUTO: 6.5 [PH] (ref 5–8)
PHOSPHATE SERPL-MCNC: 3.5 MG/DL (ref 2.5–4.5)
PLATELET # BLD AUTO: 622 10*3/MM3 (ref 140–450)
PMV BLD AUTO: 9 FL (ref 6–12)
POTASSIUM SERPL-SCNC: 4.4 MMOL/L (ref 3.5–5.2)
PROT SERPL-MCNC: 6 G/DL (ref 6–8.5)
PROT UR QL STRIP: NEGATIVE
RBC # BLD AUTO: 3.48 10*6/MM3 (ref 4.14–5.8)
RBC # UR: ABNORMAL /HPF
REF LAB TEST METHOD: ABNORMAL
SODIUM SERPL-SCNC: 136 MMOL/L (ref 136–145)
SP GR UR STRIP: 1.01 (ref 1–1.03)
SQUAMOUS #/AREA URNS HPF: ABNORMAL /HPF
UROBILINOGEN UR QL STRIP: ABNORMAL
WBC # BLD AUTO: 21.42 10*3/MM3 (ref 3.4–10.8)
WBC UR QL AUTO: ABNORMAL /HPF
YEAST URNS QL MICRO: ABNORMAL /HPF

## 2021-01-31 PROCEDURE — 25010000002 PIPERACILLIN SOD-TAZOBACTAM PER 1 G: Performed by: INTERNAL MEDICINE

## 2021-01-31 PROCEDURE — 82962 GLUCOSE BLOOD TEST: CPT

## 2021-01-31 PROCEDURE — 85027 COMPLETE CBC AUTOMATED: CPT | Performed by: INTERNAL MEDICINE

## 2021-01-31 PROCEDURE — 25010000002 MICAFUNGIN SODIUM 100 MG RECONSTITUTED SOLUTION: Performed by: INTERNAL MEDICINE

## 2021-01-31 PROCEDURE — 83735 ASSAY OF MAGNESIUM: CPT

## 2021-01-31 PROCEDURE — 80053 COMPREHEN METABOLIC PANEL: CPT | Performed by: INTERNAL MEDICINE

## 2021-01-31 PROCEDURE — 25010000002 CALCIUM GLUCONATE PER 10 ML

## 2021-01-31 PROCEDURE — 25010000003 AMPICILLIN-SULBACTAM PER 1.5 G: Performed by: INTERNAL MEDICINE

## 2021-01-31 PROCEDURE — 87040 BLOOD CULTURE FOR BACTERIA: CPT | Performed by: INTERNAL MEDICINE

## 2021-01-31 PROCEDURE — 25010000002 MAGNESIUM SULFATE PER 500 MG OF MAGNESIUM

## 2021-01-31 PROCEDURE — 83605 ASSAY OF LACTIC ACID: CPT | Performed by: COLON & RECTAL SURGERY

## 2021-01-31 PROCEDURE — 63710000001 INSULIN REGULAR HUMAN PER 5 UNITS

## 2021-01-31 PROCEDURE — 25010000002 POTASSIUM CHLORIDE PER 2 MEQ OF POTASSIUM

## 2021-01-31 PROCEDURE — 25010000002 DAPTOMYCIN PER 1 MG: Performed by: INTERNAL MEDICINE

## 2021-01-31 PROCEDURE — 81001 URINALYSIS AUTO W/SCOPE: CPT | Performed by: INTERNAL MEDICINE

## 2021-01-31 PROCEDURE — 25010000002 HEPARIN (PORCINE) PER 1000 UNITS: Performed by: COLON & RECTAL SURGERY

## 2021-01-31 PROCEDURE — 84100 ASSAY OF PHOSPHORUS: CPT

## 2021-01-31 RX ADMIN — TAZOBACTAM SODIUM AND PIPERACILLIN SODIUM 3.38 G: 375; 3 INJECTION, SOLUTION INTRAVENOUS at 15:20

## 2021-01-31 RX ADMIN — INSULIN HUMAN 2 UNITS: 100 INJECTION, SOLUTION PARENTERAL at 12:50

## 2021-01-31 RX ADMIN — CHLORPROMAZINE HYDROCHLORIDE 25 MG: 25 TABLET, FILM COATED ORAL at 15:20

## 2021-01-31 RX ADMIN — TAZOBACTAM SODIUM AND PIPERACILLIN SODIUM 3.38 G: 375; 3 INJECTION, SOLUTION INTRAVENOUS at 20:52

## 2021-01-31 RX ADMIN — SODIUM CHLORIDE 3 G: 900 INJECTION INTRAVENOUS at 08:37

## 2021-01-31 RX ADMIN — MICAFUNGIN SODIUM 100 MG: 100 INJECTION, POWDER, LYOPHILIZED, FOR SOLUTION INTRAVENOUS at 08:36

## 2021-01-31 RX ADMIN — HYDROCODONE BITARTRATE AND ACETAMINOPHEN 1 TABLET: 7.5; 325 TABLET ORAL at 15:58

## 2021-01-31 RX ADMIN — SMOFLIPID 200 ML: 6; 6; 5; 3 INJECTION, EMULSION INTRAVENOUS at 18:02

## 2021-01-31 RX ADMIN — POTASSIUM PHOSPHATE, MONOBASIC POTASSIUM PHOSPHATE, DIBASIC: 224; 236 INJECTION, SOLUTION, CONCENTRATE INTRAVENOUS at 18:02

## 2021-01-31 RX ADMIN — Medication 10 MG: at 23:16

## 2021-01-31 RX ADMIN — HEPARIN SODIUM 5000 UNITS: 5000 INJECTION, SOLUTION INTRAVENOUS; SUBCUTANEOUS at 12:50

## 2021-01-31 RX ADMIN — ESCITALOPRAM OXALATE 10 MG: 10 TABLET ORAL at 08:35

## 2021-01-31 RX ADMIN — SODIUM CHLORIDE 3 G: 900 INJECTION INTRAVENOUS at 03:15

## 2021-01-31 RX ADMIN — PANTOPRAZOLE SODIUM 40 MG: 40 INJECTION, POWDER, LYOPHILIZED, FOR SOLUTION INTRAVENOUS at 08:35

## 2021-01-31 RX ADMIN — HEPARIN SODIUM 5000 UNITS: 5000 INJECTION, SOLUTION INTRAVENOUS; SUBCUTANEOUS at 06:04

## 2021-01-31 RX ADMIN — TAMSULOSIN HYDROCHLORIDE 0.4 MG: 0.4 CAPSULE ORAL at 08:35

## 2021-01-31 RX ADMIN — HEPARIN SODIUM 5000 UNITS: 5000 INJECTION, SOLUTION INTRAVENOUS; SUBCUTANEOUS at 20:52

## 2021-01-31 RX ADMIN — SODIUM CHLORIDE, POTASSIUM CHLORIDE, SODIUM LACTATE AND CALCIUM CHLORIDE 100 ML/HR: 600; 310; 30; 20 INJECTION, SOLUTION INTRAVENOUS at 03:15

## 2021-01-31 RX ADMIN — DAPTOMYCIN 400 MG: 500 INJECTION, POWDER, LYOPHILIZED, FOR SOLUTION INTRAVENOUS at 14:58

## 2021-01-31 RX ADMIN — TAMSULOSIN HYDROCHLORIDE 0.4 MG: 0.4 CAPSULE ORAL at 20:52

## 2021-01-31 RX ADMIN — SODIUM CHLORIDE, PRESERVATIVE FREE 10 ML: 5 INJECTION INTRAVENOUS at 08:36

## 2021-01-31 RX ADMIN — DIAZEPAM 5 MG: 5 TABLET ORAL at 03:03

## 2021-01-31 RX ADMIN — PANTOPRAZOLE SODIUM 40 MG: 40 INJECTION, POWDER, LYOPHILIZED, FOR SOLUTION INTRAVENOUS at 20:52

## 2021-01-31 RX ADMIN — HYDROCODONE BITARTRATE AND ACETAMINOPHEN 1 TABLET: 7.5; 325 TABLET ORAL at 03:03

## 2021-01-31 RX ADMIN — CHLORPROMAZINE HYDROCHLORIDE 25 MG: 25 TABLET, FILM COATED ORAL at 23:16

## 2021-01-31 RX ADMIN — HYDROCODONE BITARTRATE AND ACETAMINOPHEN 1 TABLET: 7.5; 325 TABLET ORAL at 23:16

## 2021-02-01 LAB
ALBUMIN SERPL-MCNC: 2.8 G/DL (ref 3.5–5.2)
ALBUMIN/GLOB SERPL: 1 G/DL
ALP SERPL-CCNC: 82 U/L (ref 39–117)
ALT SERPL W P-5'-P-CCNC: 40 U/L (ref 1–41)
ANION GAP SERPL CALCULATED.3IONS-SCNC: 8 MMOL/L (ref 5–15)
AST SERPL-CCNC: 35 U/L (ref 1–40)
BASOPHILS # BLD AUTO: 0.08 10*3/MM3 (ref 0–0.2)
BASOPHILS NFR BLD AUTO: 0.5 % (ref 0–1.5)
BILIRUB SERPL-MCNC: 0.6 MG/DL (ref 0–1.2)
BUN SERPL-MCNC: 15 MG/DL (ref 6–20)
BUN/CREAT SERPL: 23.8 (ref 7–25)
CA-I SERPL ISE-MCNC: 1.3 MMOL/L (ref 1.12–1.32)
CALCIUM SPEC-SCNC: 8.4 MG/DL (ref 8.6–10.5)
CHLORIDE SERPL-SCNC: 102 MMOL/L (ref 98–107)
CO2 SERPL-SCNC: 26 MMOL/L (ref 22–29)
CREAT SERPL-MCNC: 0.63 MG/DL (ref 0.76–1.27)
CRP SERPL-MCNC: 3.95 MG/DL (ref 0–0.5)
DEPRECATED RDW RBC AUTO: 45.3 FL (ref 37–54)
EOSINOPHIL # BLD AUTO: 1.19 10*3/MM3 (ref 0–0.4)
EOSINOPHIL NFR BLD AUTO: 7.7 % (ref 0.3–6.2)
ERYTHROCYTE [DISTWIDTH] IN BLOOD BY AUTOMATED COUNT: 12.9 % (ref 12.3–15.4)
GFR SERPL CREATININE-BSD FRML MDRD: 131 ML/MIN/1.73
GLOBULIN UR ELPH-MCNC: 2.8 GM/DL
GLUCOSE BLDC GLUCOMTR-MCNC: 116 MG/DL (ref 70–130)
GLUCOSE BLDC GLUCOMTR-MCNC: 120 MG/DL (ref 70–130)
GLUCOSE BLDC GLUCOMTR-MCNC: 127 MG/DL (ref 70–130)
GLUCOSE BLDC GLUCOMTR-MCNC: 135 MG/DL (ref 70–130)
GLUCOSE SERPL-MCNC: 119 MG/DL (ref 65–99)
HCT VFR BLD AUTO: 31.4 % (ref 37.5–51)
HGB BLD-MCNC: 9.9 G/DL (ref 13–17.7)
IMM GRANULOCYTES # BLD AUTO: 0.37 10*3/MM3 (ref 0–0.05)
IMM GRANULOCYTES NFR BLD AUTO: 2.4 % (ref 0–0.5)
LYMPHOCYTES # BLD AUTO: 2.68 10*3/MM3 (ref 0.7–3.1)
LYMPHOCYTES NFR BLD AUTO: 17.4 % (ref 19.6–45.3)
MAGNESIUM SERPL-MCNC: 2.4 MG/DL (ref 1.6–2.6)
MCH RBC QN AUTO: 30.7 PG (ref 26.6–33)
MCHC RBC AUTO-ENTMCNC: 31.5 G/DL (ref 31.5–35.7)
MCV RBC AUTO: 97.2 FL (ref 79–97)
MONOCYTES # BLD AUTO: 0.89 10*3/MM3 (ref 0.1–0.9)
MONOCYTES NFR BLD AUTO: 5.8 % (ref 5–12)
NEUTROPHILS NFR BLD AUTO: 10.2 10*3/MM3 (ref 1.7–7)
NEUTROPHILS NFR BLD AUTO: 66.2 % (ref 42.7–76)
NRBC BLD AUTO-RTO: 0 /100 WBC (ref 0–0.2)
PHOSPHATE SERPL-MCNC: 3.7 MG/DL (ref 2.5–4.5)
PLATELET # BLD AUTO: 621 10*3/MM3 (ref 140–450)
PMV BLD AUTO: 9.2 FL (ref 6–12)
POTASSIUM SERPL-SCNC: 4.1 MMOL/L (ref 3.5–5.2)
PREALB SERPL-MCNC: 16.1 MG/DL (ref 20–40)
PROT SERPL-MCNC: 5.6 G/DL (ref 6–8.5)
RBC # BLD AUTO: 3.23 10*6/MM3 (ref 4.14–5.8)
SODIUM SERPL-SCNC: 136 MMOL/L (ref 136–145)
WBC # BLD AUTO: 15.41 10*3/MM3 (ref 3.4–10.8)

## 2021-02-01 PROCEDURE — 25010000002 PIPERACILLIN SOD-TAZOBACTAM PER 1 G

## 2021-02-01 PROCEDURE — 25010000002 PIPERACILLIN SOD-TAZOBACTAM PER 1 G: Performed by: INTERNAL MEDICINE

## 2021-02-01 PROCEDURE — 82962 GLUCOSE BLOOD TEST: CPT

## 2021-02-01 PROCEDURE — 83735 ASSAY OF MAGNESIUM: CPT

## 2021-02-01 PROCEDURE — 85025 COMPLETE CBC W/AUTO DIFF WBC: CPT | Performed by: COLON & RECTAL SURGERY

## 2021-02-01 PROCEDURE — 82330 ASSAY OF CALCIUM: CPT

## 2021-02-01 PROCEDURE — 80053 COMPREHEN METABOLIC PANEL: CPT

## 2021-02-01 PROCEDURE — 84134 ASSAY OF PREALBUMIN: CPT

## 2021-02-01 PROCEDURE — 84100 ASSAY OF PHOSPHORUS: CPT

## 2021-02-01 PROCEDURE — 25010000002 POTASSIUM CHLORIDE PER 2 MEQ OF POTASSIUM

## 2021-02-01 PROCEDURE — 25010000002 HEPARIN (PORCINE) PER 1000 UNITS: Performed by: COLON & RECTAL SURGERY

## 2021-02-01 PROCEDURE — 86140 C-REACTIVE PROTEIN: CPT

## 2021-02-01 PROCEDURE — 25010000002 CALCIUM GLUCONATE PER 10 ML

## 2021-02-01 PROCEDURE — 25010000002 MAGNESIUM SULFATE PER 500 MG OF MAGNESIUM

## 2021-02-01 PROCEDURE — 25010000002 MICAFUNGIN SODIUM 100 MG RECONSTITUTED SOLUTION: Performed by: INTERNAL MEDICINE

## 2021-02-01 RX ADMIN — SODIUM CHLORIDE, PRESERVATIVE FREE 10 ML: 5 INJECTION INTRAVENOUS at 22:10

## 2021-02-01 RX ADMIN — TAMSULOSIN HYDROCHLORIDE 0.4 MG: 0.4 CAPSULE ORAL at 21:02

## 2021-02-01 RX ADMIN — HEPARIN SODIUM 5000 UNITS: 5000 INJECTION, SOLUTION INTRAVENOUS; SUBCUTANEOUS at 14:43

## 2021-02-01 RX ADMIN — SODIUM CHLORIDE, POTASSIUM CHLORIDE, SODIUM LACTATE AND CALCIUM CHLORIDE 100 ML/HR: 600; 310; 30; 20 INJECTION, SOLUTION INTRAVENOUS at 06:06

## 2021-02-01 RX ADMIN — TAZOBACTAM SODIUM AND PIPERACILLIN SODIUM 3.38 G: 375; 3 INJECTION, SOLUTION INTRAVENOUS at 21:03

## 2021-02-01 RX ADMIN — PANTOPRAZOLE SODIUM 40 MG: 40 INJECTION, POWDER, LYOPHILIZED, FOR SOLUTION INTRAVENOUS at 09:48

## 2021-02-01 RX ADMIN — HEPARIN SODIUM 5000 UNITS: 5000 INJECTION, SOLUTION INTRAVENOUS; SUBCUTANEOUS at 06:13

## 2021-02-01 RX ADMIN — MICAFUNGIN SODIUM 100 MG: 100 INJECTION, POWDER, LYOPHILIZED, FOR SOLUTION INTRAVENOUS at 09:53

## 2021-02-01 RX ADMIN — HYDROCODONE BITARTRATE AND ACETAMINOPHEN 1 TABLET: 7.5; 325 TABLET ORAL at 13:34

## 2021-02-01 RX ADMIN — TAMSULOSIN HYDROCHLORIDE 0.4 MG: 0.4 CAPSULE ORAL at 09:48

## 2021-02-01 RX ADMIN — ESCITALOPRAM OXALATE 10 MG: 10 TABLET ORAL at 09:48

## 2021-02-01 RX ADMIN — Medication 10 MG: at 22:36

## 2021-02-01 RX ADMIN — HYDROCODONE BITARTRATE AND ACETAMINOPHEN 1 TABLET: 7.5; 325 TABLET ORAL at 06:04

## 2021-02-01 RX ADMIN — CHLORPROMAZINE HYDROCHLORIDE 25 MG: 25 TABLET, FILM COATED ORAL at 09:52

## 2021-02-01 RX ADMIN — PANTOPRAZOLE SODIUM 40 MG: 40 INJECTION, POWDER, LYOPHILIZED, FOR SOLUTION INTRAVENOUS at 21:03

## 2021-02-01 RX ADMIN — TAZOBACTAM SODIUM AND PIPERACILLIN SODIUM 3.38 G: 375; 3 INJECTION, SOLUTION INTRAVENOUS at 01:52

## 2021-02-01 RX ADMIN — TEMAZEPAM 15 MG: 15 CAPSULE ORAL at 22:36

## 2021-02-01 RX ADMIN — POTASSIUM PHOSPHATE, MONOBASIC POTASSIUM PHOSPHATE, DIBASIC: 224; 236 INJECTION, SOLUTION, CONCENTRATE INTRAVENOUS at 17:17

## 2021-02-01 RX ADMIN — SODIUM CHLORIDE, POTASSIUM CHLORIDE, SODIUM LACTATE AND CALCIUM CHLORIDE 100 ML/HR: 600; 310; 30; 20 INJECTION, SOLUTION INTRAVENOUS at 17:32

## 2021-02-01 RX ADMIN — TAZOBACTAM SODIUM AND PIPERACILLIN SODIUM 3.38 G: 375; 3 INJECTION, SOLUTION INTRAVENOUS at 14:44

## 2021-02-01 RX ADMIN — HEPARIN SODIUM 5000 UNITS: 5000 INJECTION, SOLUTION INTRAVENOUS; SUBCUTANEOUS at 21:02

## 2021-02-01 RX ADMIN — CHLORPROMAZINE HYDROCHLORIDE 25 MG: 25 TABLET, FILM COATED ORAL at 17:29

## 2021-02-01 NOTE — PROGRESS NOTES
Continued Stay Note  [facility]     Patient Name: Craig Smalls  MRN: [id number]  Today's Date: 2/1/2021    Admit Date: 1/25/2021    Discharge Plan     Row Name 02/01/21 1515       Plan    Plan  discharge plan    Plan Comments  Plan is home with spouse and HH arranged with [facility] at discharge. Pt will need HH order. [facility] following and may need outpatient IV antibiotics at discharge, pending [facility]'s final recommendations.  CM will make HH referral close to discharge as pt may need more HH services. Pt remains on TPN and not medically ready for discharge. CM will cont to follow.    Final Discharge Disposition Code  06 - home with home health care        Discharge Codes    No documentation.       Expected Discharge Date and Time     Expected Discharge Date Expected Discharge Time    Feb 4, 2021             Sonia Garcia RN

## 2021-02-01 NOTE — PROGRESS NOTES
"IM progress note      Craig Smalls  [id number]  1965     LOS: 7 days     Attending: Zane Gonzales MD    Primary Care Provider: Albert Delgado MD      Chief Complaint/Reason for visit:  Abd pain    Subjective   Doing quite well today.  Nasogastric tube has been removed.  He is taking full liquids and tolerated these.  Stoma continues to have good output.    Objective       Visit Vitals  /65 (BP Location: Left arm, Patient Position: Sitting)   Pulse 85   Temp 98.2 °F (36.8 °C) (Oral)   Resp 18   Ht 175.3 cm (69.02\")   Wt 69.8 kg (153 lb 14.4 oz)   SpO2 98%   BMI 22.72 kg/m²     Temp (24hrs), Av.9 °F (37.2 °C), Min:98.2 °F (36.8 °C), Max:99.6 °F (37.6 °C)      Nutrition: fulls    Respiratory: RA    Physical Exam:     General Appearance:    Alert, cooperative, in no acute distress   Head:    Normocephalic, without obvious abnormality, atraumatic. NGT present bilious output with some blood    Lungs:     Normal effort, symmetric chest rise, no crepitus, clear to      auscultation bilaterally             Heart:    Regular rhythm and normal rate, normal S1 and S2   Abdomen:     Soft, expected tenderness. Incision CDI. Stoma pink, with liquid output. JANNET present x2 (RLQ, LLQ)       Extremities:   No clubbing, cyanosis or edema.  No deformities.    Pulses:   Pulses palpable and equal bilaterally   Skin:   No bleeding, bruising or rash          Results Review:     I reviewed the patient's new clinical results.   Results from last 7 days   Lab Units 21  0631 21  0529   WBC 10*3/mm3 15.41* 21.42* 17.39*   HEMOGLOBIN g/dL 9.9* 10.8* 10.6*   HEMATOCRIT % 31.4* 34.0* 33.1*   PLATELETS 10*3/mm3 621* 622* 538*     Results from last 7 days   Lab Units 21  0454 21  0631 21  0529  21  0459   SODIUM mmol/L 136 136 135*   < > 134*   POTASSIUM mmol/L 4.1 4.4 3.7   < > 4.2   CHLORIDE mmol/L 102 103 100   < > 99   CO2 mmol/L 26.0 26.0 27.0   < > 28.0   BUN mg/dL 15 " 14 13   < > 13   CREATININE mg/dL 0.63* 0.57* 0.58*   < > 0.58*   CALCIUM mg/dL 8.4* 8.7 8.4*   < > 8.7   BILIRUBIN mg/dL 0.6 0.8  --   --  0.5   ALK PHOS U/L 82 91  --   --  71   ALT (SGPT) U/L 40 38  --   --  28   AST (SGOT) U/L 35 34  --   --  45*   GLUCOSE mg/dL 119* 103* 136*   < > 168*    < > = values in this interval not displayed.     01/26/2021 2157 01/29/2021 0645 Body Fluid Culture - Peritoneal Fluid, Peritoneum [[id number]]    (Abnormal)   Peritoneal Fluid from Peritoneum    Final result Component Value   Body Fluid Culture Scant growth (1+) Enterococcus faecalisAbnormal    Gram Stain Few (2+) WBCs seen    No organisms seen       Susceptibility     Enterococcus faecalis     SAMI     Ampicillin <=2  Susceptible     Gentamicin High Level Synergy SYN-S  Susceptible     Vancomycin 2  Susceptible            Linear View                  I reviewed the patient's new imaging including images and reports.    All medications reviewed.   escitalopram, 10 mg, Oral, Daily  Fat Emul Fish Oil/Plant Based, 200 mL, Intravenous, Q24H (TPN)  heparin (porcine), 5,000 Units, Subcutaneous, Q8H  insulin regular, 2-7 Units, Subcutaneous, Q6H  micafungin (MYCAMINE) IV, 100 mg, Intravenous, Q24H  pantoprazole, 40 mg, Intravenous, Q12H  piperacillin-tazobactam, 3.375 g, Intravenous, Q8H  sodium chloride, 10 mL, Intravenous, Q12H  sodium chloride, 10 mL, Intravenous, Q12H  tamsulosin, 0.4 mg, Oral, BID        Assessment/Plan     S/P Exploratory laparotomy, Washout of hematoma, cultures obtained- without gross evidence of significant ileus character or infection, MARCUS, Bilateral lower abdominal JANNET drain placements.    S/P proctocolectomy( total mesorectal excision, ileal pouch, pouch anal anastomosis, protecting loop ileostomy, resection of accessory spleen)       Small bowel obstruction (CMS/HCC)    Benign prostatic hyperplasia without lower urinary tract symptoms  Volume depletion.  History of ulcerative colitis, history of biologic  treatment.  Leukocytosis.  Intraabdominal hematoma with enterococcus infection.    Plan    1. Ambulation, several times daily.  2. Pain control-prns   3. IS-encouraged  4. DVT proph- mechs/heparin        WOCN for stoma care/ongoing    [facility], Dr Cantrell, abx management, noted WBC improvement today to 2/1/2021    NG tube is out to 121    Nutrition: TPN . Po as able.  Wean TPN when p.o. diet has been advanced and tolerated.    Restoril, melatonin to help with sleep.    Prn chlorpromazine ( stopped zofran and as needed Haldol)    D/w pt, wife. Discussed with RN.    Zane Gonzales MD  02/01/21  13:38 EST

## 2021-02-01 NOTE — PROGRESS NOTES
[facility] Progress Note        Antibiotics:  Anti-Infectives (From admission, onward)    Ordered     Dose/Rate Route Frequency Start Stop    01/31/21 1319  piperacillin-tazobactam (ZOSYN) 3.375 g in iso-osmotic dextrose 50 ml (premix)     Ordering Provider: Andrei Cantrell MD    3.375 g  over 4 Hours Intravenous Every 6 Hours 01/31/21 2015 02/14/21 2014 01/31/21 1319  piperacillin-tazobactam (ZOSYN) 3.375 g in iso-osmotic dextrose 50 ml (premix)     Ordering Provider: Andrei Cantrell MD    3.375 g  over 30 Minutes Intravenous Once 01/31/21 1415 01/31/21 1550    01/31/21 1319  DAPTOmycin (CUBICIN) 400 mg in sodium chloride 0.9 % 50 mL IVPB     Ordering Provider: Andrei Cantrell MD    6 mg/kg × 68.5 kg  100 mL/hr over 30 Minutes Intravenous Once 01/31/21 1415 01/31/21 1528    01/27/21 0823  micafungin 100 mg/100 mL 0.9% NS IVPB (mbp)     Andrei Cantrell MD reviewed the order on 02/01/21 0714.   Ordering Provider: Andrei Cantrell MD    100 mg  over 60 Minutes Intravenous Every 24 Hours 01/27/21 0900 02/10/21 0859    01/26/21 0827  micafungin 100 mg/100 mL 0.9% NS IVPB (mbp)     Ordering Provider: Andrei Cantrell MD    100 mg  over 60 Minutes Intravenous Once 01/26/21 1000 01/26/21 1052    01/26/21 0827  DAPTOmycin (CUBICIN) 400 mg in sodium chloride 0.9 % 50 mL IVPB     Ordering Provider: Andrei Cantrell MD    6 mg/kg × 68.5 kg  100 mL/hr over 30 Minutes Intravenous Once 01/26/21 0915 01/26/21 1145    01/25/21 2321  piperacillin-tazobactam (ZOSYN) 3.375 g in iso-osmotic dextrose 50 ml (premix)     Ordering Provider: Zane Gonzales MD    3.375 g  over 30 Minutes Intravenous Once 01/26/21 0015 01/26/21 0103    01/25/21 1657  piperacillin-tazobactam (ZOSYN) 3.375 g in iso-osmotic dextrose 50 ml (premix)     Ordering Provider: Cabrera Mayer MD    3.375 g Intravenous Once 01/25/21 1659 01/25/21 1755          CC: abd pain    HPI:    Patient is a 55 y.o.  Yr old male with history of  ulcerative colitis with prior Entyvio, although not for at least a month.  Admitted January 19 until January 23 for surgery by Dr. Montero; he underwent surgery on January 19 with proctocolectomy, total measle rectal excision with ileal pouch, protecting loop ileostomy and pouch/anal anastomosis with resection of accessory spleen. Drains removed prior to discharge.   Postoperative course had been complicated by drop in hematocrit postop day 2 per records with concern for possible hematoma.  In addition notes indicate JEFF stapler dysfunction but no evidence at the time for leak/extravasation; discharged  January 23.  Readmitted on January 25 with progressive abdominal pain, particularly in the left side lower quadrant.  Evidence for leukocytosis and fever to 100.6; CT scan of the abdomen with evidence for free air, high density material intermingled with abdominal ascites per radiology, but no discrete anastomotic leak seen per radiology.   Dilated small bowel loops noted  Per radiology.  Case discussed with Dr. Montero with  operative washout on January 26.     1/31/21 wbc increased, empiric change unasyn to zosyn;  daptmoycin x 1 ;  U/A and blood cultures sent    2/1/21 feeling better in general ;  LG temps at 99 per nursing;   fatigued; postop Abdominal pain is less intense, intermittent, nonradiating,  Worse with movement and pressure,  2   out of 10 in severity, better with pain meds.  He denies hematochezia melena or hematemesis.    No headache photophobia or neck stiffness.  No shortness of breath  Hemoptysis; mild dry cough.  No dysuria hematuria or pyuria.   No other new skin rash     ROS:      2/1/21 No f/c/s. No n/v/d. No rash. No new ADR to Abx.     Constitutional-- No  chills or sweats.  Appetite diminished with fatigue  Heent-- No new vision, hearing or throat complaints.  No epistaxis or oral sores.  Denies odynophagia or dysphagia.  No flashers, floaters or eye pain. No odynophagia or dysphagia. No  "headache, photophobia or neck stiffness.  CV-- No chest pain, palpitation or syncope  Resp-- No SOB/cough/Hemoptysis  GI- +nausea; No vomiting, or diarrhea.  No hematochezia, melena, or hematemesis. Denies jaundice or chronic liver disease.  -- No dysuria, hematuria, or flank pain.  Denies hesitancy, urgency or flank pain.  Lymph- no swollen lymph nodes in neck/axilla or groin.   Heme- No active bruising; no Hx of DVT or PE.  MS-- no swelling or pain in the bones or joints of arms/legs.  No new back pain.  Neuro-- No acute focal weakness or numbness in the arms or legs.  No seizures.     Full 12 point review of systems reviewed and negative otherwise for acute complaints, except for above       PE:   /67 (BP Location: Left arm, Patient Position: Lying)   Pulse 73   Temp 99 °F (37.2 °C) (Oral)   Resp 16   Ht 175.3 cm (69.02\")   Wt 69.8 kg (153 lb 14.4 oz)   SpO2 98%   BMI 22.72 kg/m²     GENERAL: sleepy  , in no acute distress. NG tube  HEENT: Normocephalic, atraumatic.  PERRL. EOMI. No conjunctival injection. No icterus. Oropharynx clear without evidence of thrush or exudate. No evidence of peridontal disease.    NECK: Supple without nuchal rigidity. No mass.  LYMPH: No cervical, axillary or inguinal lymphadenopathy.  HEART: RRR; No murmur, rubs, gallops.   LUNGS: diminished at bases , trace crackles bilaterally without wheezing,   rhonchi. Normal respiratory effort. Nonlabored. No dullness.  ABDOMEN: Soft, tender diffusely and distended. diminished bowel sounds.  NO mass or HSM.  EXT:  No cyanosis, clubbing or edema. No cord.  : Genitalia generally unremarkable.    MSK: FROM without joint effusions noted arms/legs.    SKIN: Warm and dry without cutaneous eruptions on Inspection/palpation.    NEURO: sleepy       No peripheral stigmata such phenomena of endocarditis     abd  surgical site without obvious purulence; wound grnaulating.  Minimal skin discoloration and no discrete mass/bulge or " fluctuance. No new purulence     ostomy noted     IV without obvious redness or drainage    Laboratory Data    Results from last 7 days   Lab Units 02/01/21  0454 01/31/21  0631 01/30/21  0529   WBC 10*3/mm3 15.41* 21.42* 17.39*   HEMOGLOBIN g/dL 9.9* 10.8* 10.6*   HEMATOCRIT % 31.4* 34.0* 33.1*   PLATELETS 10*3/mm3 621* 622* 538*     Results from last 7 days   Lab Units 02/01/21  0454   SODIUM mmol/L 136   POTASSIUM mmol/L 4.1   CHLORIDE mmol/L 102   CO2 mmol/L 26.0   BUN mg/dL 15   CREATININE mg/dL 0.63*   GLUCOSE mg/dL 119*   CALCIUM mg/dL 8.4*     Results from last 7 days   Lab Units 02/01/21  0454   ALK PHOS U/L 82   BILIRUBIN mg/dL 0.6   ALT (SGPT) U/L 40   AST (SGOT) U/L 35         Results from last 7 days   Lab Units 02/01/21  0454   CRP mg/dL 3.95*       Estimated Creatinine Clearance: 127.7 mL/min (A) (by C-G formula based on SCr of 0.63 mg/dL (L)).      Microbiology:      Radiology:  Imaging Results (Last 72 Hours)     Procedure Component Value Units Date/Time    XR Abdomen KUB [[id number]] Collected: 01/29/21 1403     Updated: 01/29/21 2208    Narrative:      EXAMINATION: XR ABDOMEN/KUB-01/29/2021:     INDICATION: Tube placement; K56.609-Unspecified intestinal obstruction,  unspecified as to partial versus complete obstruction; R10.9-Unspecified  abdominal pain; G89.18-Other acute postprocedural pain; T14.8XXA-Other  injury of unspecified body region, initial encounter.     COMPARISON: Abdomen and pelvis CT scan 01/26/2021.     FINDINGS: JANNET drains are seen in the pelvis. There is diffuse gaseous  distention of small bowel as seen on the previous CT scan. There may be  mild diffuse small bowel mucosal edema. NG tube is seen in the proximal  stomach.       Impression:      1. NG tube in the proximal stomach.  2. Diffuse mild-to-moderate small bowel distention, which may represent  ileus or incomplete obstruction.     D:  01/29/2021  E:  01/29/2021            This report was finalized on 1/29/2021 10:05 PM  by Dr. Kp Hewitt MD.               Impression:     --acute postoperative IA abscess/infected hematoma, E Faecalis so far in culture;    Postoperative course with concern for blood loss and  hematoma.   radiology reports no confirmatory evidence for leak. Had operative intervention/washout  1/26   ; less cough and incentive spirometry encouraged;  No urinary symptoms, no other skin/soft tissue process present.  IV site appears okay.  Abdominal wall surgical site and stoma appear okay.  Monitor for other potential process    **culture with enterococcus  So far    --postop leukocytosis as below     --acute abd pain as above     --Ulcerative colitis with prior history immunosuppression/Biologics although not for at least a month preceding admission. Surgery as above on January 19 with colon resection     -- Acute postoperative anemia with concern for acute blood loss and hematoma as above       PLAN:     --IV  Zosyn/mycamine; daptomycin x 1 while repeat culture pending     --Checks/review labs cultures and scans     --Partial history per nursing staff     --Discussed with microbiology       --Highly complex set of issues with high risk for further serious morbidity and other serious sequela     --Urinalysis noted and bland    --encouraged incentive spirometry    --d/w Dr DEAN and Dr Montero    --wbc trended up postop, but overall feeling better; d/w Dr DEAN, nursing  And Dr Montero;  Checked urinalysis;  No new resp symptoms, exam nonfocal and encouraged spirometry; abd improving and no new suppurative change at abd wall;  No new urinary symptoms; IF continued/recurrent climb in wbc you may need to check further scans -v- further empiric abx adjustments -v- both/other workup      Andrei Cantrell MD  2/1/2021

## 2021-02-01 NOTE — PLAN OF CARE
Problem: Adult Inpatient Plan of Care  Goal: Plan of Care Review  Outcome: Ongoing, Progressing  Flowsheets (Taken 2/1/2021 0648)  Plan of Care Reviewed With: patient  Outcome Summary: VSS. Room air. Normal sinus. Frequent hiccups. Incisional pain treated with PO medication. Scant output from JANNET drains. PICC dressing change due 2/2  Goal: Patient-Specific Goal (Individualized)  Outcome: Ongoing, Progressing  Goal: Absence of Hospital-Acquired Illness or Injury  Outcome: Ongoing, Progressing  Intervention: Identify and Manage Fall Risk  Recent Flowsheet Documentation  Taken 2/1/2021 0604 by Diana Hernandez RN  Safety Promotion/Fall Prevention:   activity supervised   assistive device/personal items within reach   clutter free environment maintained   fall prevention program maintained   nonskid shoes/slippers when out of bed   room organization consistent   safety round/check completed   toileting scheduled  Taken 2/1/2021 0400 by Diana Hernandez RN  Safety Promotion/Fall Prevention:   activity supervised   assistive device/personal items within reach   clutter free environment maintained   fall prevention program maintained   nonskid shoes/slippers when out of bed   room organization consistent   safety round/check completed   toileting scheduled  Taken 2/1/2021 0200 by Diana Hernandez RN  Safety Promotion/Fall Prevention:   activity supervised   assistive device/personal items within reach   clutter free environment maintained   fall prevention program maintained   nonskid shoes/slippers when out of bed   room organization consistent   safety round/check completed   toileting scheduled  Taken 2/1/2021 0000 by Diana Hernandez, RN  Safety Promotion/Fall Prevention:   activity supervised   assistive device/personal items within reach   clutter free environment maintained   fall prevention program maintained   nonskid shoes/slippers when out of bed   room organization consistent   safety round/check  completed   toileting scheduled  Taken 1/31/2021 2200 by Diana Hernandez RN  Safety Promotion/Fall Prevention:   activity supervised   assistive device/personal items within reach   clutter free environment maintained   fall prevention program maintained   nonskid shoes/slippers when out of bed   room organization consistent   safety round/check completed   toileting scheduled  Taken 1/31/2021 2000 by Diana Hernandez RN  Safety Promotion/Fall Prevention:   activity supervised   assistive device/personal items within reach   clutter free environment maintained   fall prevention program maintained   nonskid shoes/slippers when out of bed   room organization consistent   safety round/check completed   toileting scheduled  Intervention: Prevent Skin Injury  Recent Flowsheet Documentation  Taken 2/1/2021 0604 by Diana Hernandez RN  Body Position: position changed independently  Taken 2/1/2021 0400 by Diana Hernandez RN  Body Position: position changed independently  Taken 2/1/2021 0200 by Diana Hernandez RN  Body Position: position changed independently  Taken 2/1/2021 0000 by Diana Hernandez RN  Body Position: position changed independently  Taken 1/31/2021 2200 by Diana Hernandez RN  Body Position: position changed independently  Taken 1/31/2021 2000 by Diana Hernandez RN  Body Position: position changed independently  Intervention: Prevent and Manage VTE (venous thromboembolism) Risk  Recent Flowsheet Documentation  Taken 1/31/2021 2000 by Diana Hernandez RN  VTE Prevention/Management:   bleeding risk factor(s) identified   dorsiflexion/plantar flexion performed  Goal: Optimal Comfort and Wellbeing  Outcome: Ongoing, Progressing  Intervention: Provide Person-Centered Care  Recent Flowsheet Documentation  Taken 1/31/2021 2000 by Diana Hernandez RN  Trust Relationship/Rapport:   care explained   questions answered   thoughts/feelings acknowledged  Goal: Readiness for Transition of  Care  Outcome: Ongoing, Progressing   Goal Outcome Evaluation:  Plan of Care Reviewed With: patient     Outcome Summary: VSS. Room air. Normal sinus. Frequent hiccups. Incisional pain treated with PO medication. Scant output from JANNET drains. PICC dressing change due 2/2

## 2021-02-01 NOTE — PROGRESS NOTES
Pharmacy Parenteral Nutrition Evaluation    Craig Smalls is a 55 y.o. male receiving TPN.    Indication: Prolonged Paralytic Ileus  Consulting Physician: Dr. Montero  IVF: LR @ 100 mL/hr    Labs  Results from last 7 days   Lab Units 02/01/21 0454 01/31/21 0631 01/30/21  0529 01/28/21  0459   SODIUM mmol/L 136 136 135*   < > 134*   POTASSIUM mmol/L 4.1 4.4 3.7   < > 4.2   CHLORIDE mmol/L 102 103 100   < > 99   CO2 mmol/L 26.0 26.0 27.0   < > 28.0   BUN mg/dL 15 14 13   < > 13   CREATININE mg/dL 0.63* 0.57* 0.58*   < > 0.58*   CALCIUM mg/dL 8.4* 8.7 8.4*   < > 8.7   BILIRUBIN mg/dL 0.6 0.8  --   --  0.5   ALK PHOS U/L 82 91  --   --  71   ALT (SGPT) U/L 40 38  --   --  28   AST (SGOT) U/L 35 34  --   --  45*   GLUCOSE mg/dL 119* 103* 136*   < > 168*    < > = values in this interval not displayed.     Results from last 7 days   Lab Units 02/01/21 0454 01/31/21 0631 01/30/21 0529 01/26/21  0429   MAGNESIUM mg/dL 2.4 2.4 2.6   < > 1.9   PHOSPHORUS mg/dL 3.7 3.5 3.0   < > 3.2   PREALBUMIN mg/dL 16.1*  --   --   --  6.7*    < > = values in this interval not displayed.     Results from last 7 days   Lab Units 02/01/21 0454 01/31/21 0631 01/30/21  0529   WBC 10*3/mm3 15.41* 21.42* 17.39*   HEMOGLOBIN g/dL 9.9* 10.8* 10.6*   HEMATOCRIT % 31.4* 34.0* 33.1*   PLATELETS 10*3/mm3 621* 622* 538*     Triglycerides   Date Value Ref Range Status   01/26/2021 70 0 - 150 mg/dL Final     Comment:     Falsely depressed results may occur on samples drawn from patients receiving N-Acetylcysteine (NAC) or Metamizole.     estimated creatinine clearance is 127.7 mL/min (A) (by C-G formula based on SCr of 0.63 mg/dL (L)).    Intake & Output (last 3 days)         01/29 0701 - 01/30 0700 01/30 0701 - 01/31 0700 01/31 0701 - 02/01 0700 02/01 0701 - 02/02 0700    P.O.  600 1220 240    I.V. (mL/kg) 1000 (14.7) 3524 (51.4) 2117 (30.3)     NG/GT  20      IV Piggyback 200  40.4     TPN 2039.8 1350 1369.2     Total Intake(mL/kg) 3239.8  (47.6) 5494 (80.2) 4746.6 (68) 240 (3.4)    Urine (mL/kg/hr) 2000 (1.2) 1675 (1) 1100 (0.7) 175 (0.4)    Emesis/NG output 3100 1200      Drains 90 60 20     Stool 50 825 400 100    Total Output 5240 3760 1520 275    Net -2000.2 +1734 +3226.6 -35            Stool Unmeasured Occurrence  1 x            Results from last 7 days   Lab Units 21  1226 21  0528 21  2313 21  1616 21  1119 21  0520   GLUCOSE mg/dL 116 127 133* 141* 174* 115     Lab Results   Lab Value Date/Time    HGBA1C 5.30 2021 1325     Dietitian Recommendations  Dietary Orders (From admission, onward)       Start     Ordered    21 1318  Diet Full Liquid  Diet Effective Now     Question:  Diet Texture / Consistency  Answer:  Full Liquid    21 1318    21 1452  Dietary Nutrition Supplements Boost Plus  Daily With Breakfast, Lunch & Dinner     Comments: If patient on full liquid or solid food diet - send Boost Plus with each meal. ThanksSHARRON.   Question:  Select Supplement:  Answer:  Boost Plus    21 1451    21 1451  Dietary Nutrition Supplements Boost Breeze (Clear Liquid)  Daily With Breakfast, Lunch & Dinner     Comments: If patient on clear liquid diet - send Boost Breeze with each meal. SHARRON Moore.   Question:  Select Supplement:  Answer:  Boost Breeze (Clear Liquid)    21 1450                  Stop SMOF lipids today (per Harriet Huston, SHARRON)    Current TPN Regimen      Dextrose 20% / Amino Acid 6% at goal rate of 75 mL/hr.    Na: 45 mEq/ L  K: 40 mEq/L  Ca: 5 mEq/L  Ma mEq/L  Phos: 15 mmol/ L  Chloride: Acetate  Max Chloride 1.24:1    Assessment/Plan:    1. Pharmacy to dose TPN for prolonged paralytic ileus starting .    2. TPN continues today with macronutrients per RD recommendations as above at goal rate of 75 mL/hr. RD recommends to stop SMOF lipids today.       Will continue standard electrolyte concentrations in TPN today.  3. Blood glucose controlled with  low-dose correctional regular insulin. Exogenous electrolyte replacements will be ordered as needed per protocol.  4. Patient's diet has been advanced to clear liquids, with TPN continuing for now.  He has Boost Plus TID meals.      Patient also has active order for LR infusing at 100 mLhr.  5. Pharmacy will continue to follow closely and make adjustments to TPN as necessary.    Thank you,    Adalgisa Flores, PharmD  2/1/2021  13:26 EST

## 2021-02-01 NOTE — PROGRESS NOTES
Pharmacokinetic Dose Adjustment- Zosyn Extended Infusion Dosing  Pharmacy has adjusted the dose of Zosyn for Craig Smalls to match new extended infusion (4 hour infusion) protocol dosing (see chart below for dosing recommendations).    Diagnosis:  --acute postoperative IA abscess/infected hematoma, E Faecalis so far in culture    Estimated Creatinine Clearance: 127.7 mL/min (A) (by C-G formula based on SCr of 0.63 mg/dL (L)).  69.8 kg (153 lb 14.4 oz)    Piperacillin/Tazobactam (4-hour infusion)  Ordered dose (30 min infusion) Dose to interchange   4.5 gm IV every 6 hours   (BMI > 40, Wt. > 120kg , MDRO**, septic shock)  CrCl > 20 mL/min: 4.5 gm IV q8hr   CrCl ? 20 mL/min or HD: 4.5 gm IV q12hr    3.375 gm IV every 6 hours    CrCl > 20 mL/min: 3.375 gm IV q8hr   CrCl ? 20 mL/min or HD: 3.375 gm IV q12hr    2.25 gm IV every 6 hours  CrCl > 20 mL/min: 3.375 gm IV q8hr   CrCl ? 20 mL/min or HD: 3.375 gm IV q12hr      Assessment/Plan  1. Change Zosyn from 3.375gm IV q6h to Zosyn 3.375 gm IV q8h (infused over 4 hours)  2. Pharmacy will continue to monitor patient's renal function for further potential dose adjustments.    Adalgisa Flores, PharmD  2/1/2021  09:48 EST

## 2021-02-01 NOTE — PROGRESS NOTES
Clinical Nutrition   Reason For Visit: Follow-up protocol, PN/PO    Patient Name: Craig Smalls  YOB: 1965  MRN: [id number]  Date of Encounter: 02/01/21 14:08 EST  Admission date: 1/25/2021      -Continue current dextrose and amino acid concentrations, but discontinue IV lipids:  D20%, AA6% @ 75 ml/hr.  This regimen provides 1.8 L, 5656 calories (81% est needs), 108 g protein (100% est needs), GIR = 3.5 mg/kg/min.    -RD notes MD would like TPN to be weaned once diet is further advanced. For weaning, RD recommends decreasing rate from 75 ml/hr to 35 ml/hr and running this rate x 6-8 hours prior to complete discontinuation.    Nutrition Assessment     Admission Problem List:  Recent previous admission: Ulcerative colitis s/p proctocolectomy, total mesorectal excision, ileal pouch, anal anastomosis, protecting loop ileostomy (1/19/21)    Abdominal pain  High output ileostomy  Some degree of ileus  Mild dilation small bowel  Acute post-op fever/leukocytosis  Pelvic hematoma  Intra-abdominal fluid collection      Applicable PMH:  Ulcerative colitis      Applicable medical tests/procedures since admission:  (1/25) NGT to L[facility] initiated  (1/26) s/p exploratory laparotomy, washout of hematoma, no evidence of ileus or infection, lysis of adhesions, bilateral lower abdominal JANNET drains placed; PICC placed and TPN initiated  (1/31) NGT to LWS discontinued       Reported/Observed/Food/Nutrition Related History   I/Os within past 24 hours:  Ileostomy output = 400 ml  UOP = 1100 ml  Drain output = 20 ml    Patient and wife present during visit. Patient reports he is feeling much better since last RD visit a few days ago. Patient consuming and tolerating full liquid diet today. Has had one Boost so far. Okay with all flavors. Requests Boost Glucose Control instead of Boost Plus d/t sugar content (personal choice, not related to diabetes).    Anthropometrics   Height: 69 in  Weight: 153 lbs (bed scale wt 2/1 per  nsg doc)  BMI: 22.7  BMI classification: Normal: 18.5-24.9kg/m2   IBW: 160 lbs    UBW: 165 lbs (standing wt 1/18/21 per EMR)  Weight change: ? Unintentional weight loss of 12 lbs x 2 weeks    Labs reviewed   Labs reviewed: Yes    Medications reviewed   Medications reviewed: Yes  Pertinent: antibiotic, insulin  GTT: LR @ 100 ml/hr    Needs Assessment (1/27)   Height used: 69 in/175.3 cm  Weight used: 159 lbs/72 kg (actual wt)    Estimated Calories needs: ~2000 calories daily  25 kcal/kg = 1800  MSJ = 1598 x 1.3 = 2077    Estimated Protein needs: ~108 g protein  1.2-1.5 g/kg =     Current Nutrition Prescription   PO: Full liquids  Oral Nutrition Supplement: Boost Plus 3x daily    PN: D20%, AA6% @ 75 ml/hr. 200 ml 20% SMOF lipid daily.  Route: PICC  Nutrition provided at goal rate: 1.8 L, 2056 calories (103% est needs), 108 g protein (100% est needs), GIR = 3.5 mg/kg/min.    Evaluation of Received Nutrient/Fluid Intake:  PO- insufficient data  TPN- 3 day delivery average = 1458 ml (81% goal volume) per I/Os, IVLE administered per MAR; RD notes TPN delivery data underestimating true delivery amount as TPN was not held - insufficient doc    Nutrition Diagnosis     1/28, 2/1  Problem Inadequate oral intake   Etiology Altered GI function   Signs/Symptoms Full liquid diet with supplemental TPN via PICC   Status: ongoing/improving    1/28  Problem Food and nutrition knowledge deficit   Etiology No prior receipt of education   Signs/Symptoms Patient with new ileostomy (created 1/19/21) and did not receive ileostomy nutrition education after surgery   Status: resolved 1/29 - RD provided education    Intervention   Intervention: Follow treatment progress, Care plan reviewed, Interview for preferences, Adjusted supplement, Encourage intake, Nutrition support order placed     -Continue current dextrose and amino acid concentrations, but discontinue IV lipids:  D20%, AA6% @ 75 ml/hr.  This regimen provides 1.8 L, 5656  calories (81% est needs), 108 g protein (100% est needs), GIR = 3.5 mg/kg/min.    -RD notes MD would like TPN to be weaned once diet is further advanced. For weaning, RD recommends decreasing rate from 75 ml/hr to 35 ml/hr and running this rate x 6-8 hours prior to complete discontinuation.    Goal:   General: Nutrition support treatment  PO: Tolerate PO, Increase intake, Advace diet as medically appropriate  EN/PN: Adjust PN, PN to PO    Monitoring/Evaluation:   Monitoring/Evaluation: Per protocol, I&O, PO intake, Supplement intake, Pertinent labs, PN delivery/tolerance, Weight, GI status, Symptoms    Harriet Huston RD  Time Spent: 45 min

## 2021-02-01 NOTE — PROGRESS NOTES
"[facility] ([facility])      Feels well, walked 5 times today  Up at 430, energetic  Looking forward to watching his grandson play basketball.    Vital Signs:  Blood pressure 100/64, pulse 81, temperature 98.1 °F (36.7 °C), temperature source Oral, resp. rate 18, height 175.3 cm (69.02\"), weight 69.8 kg (153 lb 14.4 oz), SpO2 98 %.    Labs past 24 hours:  Lab Results (last 24 hours)     Procedure Component Value Units Date/Time    POC Glucose Once [[id number]]  (Abnormal) Collected: 02/01/21 1728    Specimen: Blood Updated: 02/01/21 1736     Glucose 135 mg/dL     Blood Culture - Blood, Hand, Left [[id number]] Collected: 01/31/21 1352    Specimen: Blood from Hand, Left Updated: 02/01/21 1500     Blood Culture No growth at 24 hours    Blood Culture - Blood, Arm, Left [[id number]] Collected: 01/31/21 1352    Specimen: Blood from Arm, Left Updated: 02/01/21 1500     Blood Culture No growth at 24 hours    Prealbumin [[id number]]  (Abnormal) Collected: 02/01/21 0454    Specimen: Blood Updated: 02/01/21 1306     Prealbumin 16.1 mg/dL     POC Glucose Once [[id number]]  (Normal) Collected: 02/01/21 1226    Specimen: Blood Updated: 02/01/21 1236     Glucose 116 mg/dL     C-reactive Protein [[id number]]  (Abnormal) Collected: 02/01/21 0454    Specimen: Blood Updated: 02/01/21 0743     C-Reactive Protein 3.95 mg/dL     Comprehensive Metabolic Panel [[id number]]  (Abnormal) Collected: 02/01/21 0454    Specimen: Blood Updated: 02/01/21 0723     Glucose 119 mg/dL      BUN 15 mg/dL      Creatinine 0.63 mg/dL      Sodium 136 mmol/L      Potassium 4.1 mmol/L      Chloride 102 mmol/L      CO2 26.0 mmol/L      Calcium 8.4 mg/dL      Total Protein 5.6 g/dL      Albumin 2.80 g/dL      ALT (SGPT) 40 U/L      AST (SGOT) 35 U/L      Alkaline Phosphatase 82 U/L      Total Bilirubin 0.6 mg/dL      eGFR Non African Amer 131 mL/min/1.73      Globulin 2.8 gm/dL      A/G Ratio 1.0 g/dL      BUN/Creatinine Ratio 23.8     " Anion Gap 8.0 mmol/L     Narrative:      GFR Normal >60  Chronic Kidney Disease <60  Kidney Failure <15      Magnesium [[id number]]  (Normal) Collected: 02/01/21 0454    Specimen: Blood Updated: 02/01/21 0723     Magnesium 2.4 mg/dL     Phosphorus [[id number]]  (Normal) Collected: 02/01/21 0454    Specimen: Blood Updated: 02/01/21 0723     Phosphorus 3.7 mg/dL     Calcium, Ionized [[id number]]  (Normal) Collected: 02/01/21 0454    Specimen: Blood Updated: 02/01/21 0717     Ionized Calcium 1.30 mmol/L     CBC & Differential [[id number]]  (Abnormal) Collected: 02/01/21 0454    Specimen: Blood Updated: 02/01/21 0635    Narrative:      The following orders were created for panel order CBC & Differential.  Procedure                               Abnormality         Status                     ---------                               -----------         ------                     CBC Auto Differential[[id number]]        Abnormal            Final result                 Please view results for these tests on the individual orders.    CBC Auto Differential [[id number]]  (Abnormal) Collected: 02/01/21 0454    Specimen: Blood Updated: 02/01/21 0635     WBC 15.41 10*3/mm3      RBC 3.23 10*6/mm3      Hemoglobin 9.9 g/dL      Hematocrit 31.4 %      MCV 97.2 fL      MCH 30.7 pg      MCHC 31.5 g/dL      RDW 12.9 %      RDW-SD 45.3 fl      MPV 9.2 fL      Platelets 621 10*3/mm3      Neutrophil % 66.2 %      Lymphocyte % 17.4 %      Monocyte % 5.8 %      Eosinophil % 7.7 %      Basophil % 0.5 %      Immature Grans % 2.4 %      Neutrophils, Absolute 10.20 10*3/mm3      Lymphocytes, Absolute 2.68 10*3/mm3      Monocytes, Absolute 0.89 10*3/mm3      Eosinophils, Absolute 1.19 10*3/mm3      Basophils, Absolute 0.08 10*3/mm3      Immature Grans, Absolute 0.37 10*3/mm3      nRBC 0.0 /100 WBC     POC Glucose Once [[id number]]  (Normal) Collected: 02/01/21 0528    Specimen: Blood Updated: 02/01/21 0530     Glucose 127 mg/dL     POC Glucose Once  [[id number]]  (Abnormal) Collected: 01/31/21 2313    Specimen: Blood Updated: 01/31/21 2315     Glucose 133 mg/dL     AFB Culture - Peritoneal Fluid, Peritoneum [[id number]] Collected: 01/26/21 2157    Specimen: Peritoneal Fluid from Peritoneum Updated: 01/31/21 2215     AFB Culture No AFB isolated at less than 1 week     AFB Stain No acid fast bacilli seen on concentrated smear    Urinalysis, Microscopic Only - Urine, Clean Catch [[id number]]  (Abnormal) Collected: 01/31/21 2115    Specimen: Urine, Clean Catch Updated: 01/31/21 2147     RBC, UA 3-6 /HPF      WBC, UA 3-5 /HPF      Bacteria, UA None Seen /HPF      Squamous Epithelial Cells, UA 0-2 /HPF      Yeast, UA Small/1+ Budding Yeast /HPF      Hyaline Casts, UA 0-6 /LPF      Methodology Manual Light Microscopy    Urinalysis With Microscopic If Indicated (No Culture) - Urine, Clean Catch [[id number]]  (Abnormal) Collected: 01/31/21 2115    Specimen: Urine, Clean Catch Updated: 01/31/21 2145     Color, UA Yellow     Appearance, UA Clear     pH, UA 6.5     Specific Gravity, UA 1.011     Glucose, UA Negative     Ketones, UA Negative     Bilirubin, UA Negative     Blood, UA Trace     Protein, UA Negative     Leuk Esterase, UA Negative     Nitrite, UA Negative     Urobilinogen, UA 0.2 E.U./dL          I/O last shift:  I/O this shift:  In: 2235 [P.O.:240; I.V.:1058; IV Piggyback:100]  Out: 925 [Urine:425; Stool:500]     PHYSICAL EXAM-  Comfortable  Tolerating liquids  cv- rsr  Chest- clear, =  Abd- soft, mild distention, good stoma output, appropriate volume and character to bilateral JANNET drains    Pathology:  Order Name Source Comment Collection Info Order Time   COVID PRE-OP / PRE-PROCEDURE SCREENING ORDER (NO ISOLATION) Nasopharynx   1/26/2021  2:47 PM     Please select your location:   [facility]          COVID Screening Order:   In-House: EMERGENT-DIAMOND,2 HR TAT [[id number]]          Previously tested for COVID-19?   Unknown          Employed in healthcare setting?    Unknown          Symptomatic for COVID-19 as defined by CDC?   Unknown          Hospitalized for COVID-19?   Unknown          Admitted to ICU for COVID-19?   Unknown          Resident in a congregate (group) care setting?   Unknown        COVID PRE-OP / PRE-PROCEDURE SCREENING ORDER (NO ISOLATION) Nasopharynx   1/26/2021  6:51 PM     Please select your location:   [facility]          COVID Screening Order:   In-House: EMERGENT-CEPHEID,3-4 HR TAT [[id number]]          Previously tested for COVID-19?   Yes          Employed in healthcare setting?   Unknown          Symptomatic for COVID-19 as defined by CDC?   Unknown          Hospitalized for COVID-19?   Unknown          Admitted to ICU for COVID-19?   Unknown          Resident in a congregate (group) care setting?   Unknown        ANAEROBIC CULTURE Peritoneum  Collected By: Silverio Monetro MD 1/26/2021 10:01 PM   BODY FLUID CULTURE Peritoneum  Collected By: Silverio Montero MD 1/26/2021 10:01 PM   AFB CULTURE Peritoneum  Collected By: Silverio Montero MD 1/26/2021 10:01 PM   .    Assessment and Plan:  Good progress, I was concerned yesterday with the white count of 20,000, even though I was off/discussed with Dr. Alfredo.  Happy to see white count go down to 15,000 today.  Vital signs have been reassuring throughout the last week.  Good energy level  We will advance from liquids to low fiber diet  Potential for home Wednesday or Thursday.    Silverio Montero MD  02/01/21  18:25 EST

## 2021-02-01 NOTE — NURSING NOTE
"WOC follow-up:     Loop ileostomy     At this time medial appliance edge is loose and appliance is about to leak.   Appliance change and stomal care performed.     Stoma looks good.   Good stomal function noted.   Peristomal skin is intact and red.   Shallow bilateral MC separation noted.   Area cleaned and applied stoma powder and barrier spray to MC areas.     Has bilateral peristomal creasing, needs convexity.   Placed him in a [place] image 2 3/4\" convexity cut at 41mm.     NG-tube was removed over the weekend.   TPN continues.   Tolerating full liquids PO.   Patient looks good today and he stated that he feel much better.     Will continue to follow and provide convexity appliances at discharge.   Contact WOC if needs arise.     Thanks       "

## 2021-02-02 LAB
ALBUMIN SERPL-MCNC: 3 G/DL (ref 3.5–5.2)
ALBUMIN/GLOB SERPL: 0.9 G/DL
ALP SERPL-CCNC: 88 U/L (ref 39–117)
ALT SERPL W P-5'-P-CCNC: 50 U/L (ref 1–41)
ANION GAP SERPL CALCULATED.3IONS-SCNC: 7 MMOL/L (ref 5–15)
AST SERPL-CCNC: 38 U/L (ref 1–40)
BASOPHILS # BLD AUTO: 0.08 10*3/MM3 (ref 0–0.2)
BASOPHILS NFR BLD AUTO: 0.5 % (ref 0–1.5)
BILIRUB SERPL-MCNC: 0.4 MG/DL (ref 0–1.2)
BUN SERPL-MCNC: 14 MG/DL (ref 6–20)
BUN/CREAT SERPL: 23.7 (ref 7–25)
CALCIUM SPEC-SCNC: 8.9 MG/DL (ref 8.6–10.5)
CHLORIDE SERPL-SCNC: 101 MMOL/L (ref 98–107)
CO2 SERPL-SCNC: 29 MMOL/L (ref 22–29)
CREAT SERPL-MCNC: 0.59 MG/DL (ref 0.76–1.27)
DEPRECATED RDW RBC AUTO: 45.3 FL (ref 37–54)
EOSINOPHIL # BLD AUTO: 0.93 10*3/MM3 (ref 0–0.4)
EOSINOPHIL NFR BLD AUTO: 6.3 % (ref 0.3–6.2)
ERYTHROCYTE [DISTWIDTH] IN BLOOD BY AUTOMATED COUNT: 12.8 % (ref 12.3–15.4)
GFR SERPL CREATININE-BSD FRML MDRD: 142 ML/MIN/1.73
GLOBULIN UR ELPH-MCNC: 3.4 GM/DL
GLUCOSE BLDC GLUCOMTR-MCNC: 103 MG/DL (ref 70–130)
GLUCOSE BLDC GLUCOMTR-MCNC: 115 MG/DL (ref 70–130)
GLUCOSE BLDC GLUCOMTR-MCNC: 121 MG/DL (ref 70–130)
GLUCOSE BLDC GLUCOMTR-MCNC: 129 MG/DL (ref 70–130)
GLUCOSE BLDC GLUCOMTR-MCNC: 133 MG/DL (ref 70–130)
GLUCOSE BLDC GLUCOMTR-MCNC: 140 MG/DL (ref 70–130)
GLUCOSE SERPL-MCNC: 128 MG/DL (ref 65–99)
HCT VFR BLD AUTO: 32.8 % (ref 37.5–51)
HGB BLD-MCNC: 10.4 G/DL (ref 13–17.7)
IMM GRANULOCYTES # BLD AUTO: 0.26 10*3/MM3 (ref 0–0.05)
IMM GRANULOCYTES NFR BLD AUTO: 1.8 % (ref 0–0.5)
LYMPHOCYTES # BLD AUTO: 1.85 10*3/MM3 (ref 0.7–3.1)
LYMPHOCYTES NFR BLD AUTO: 12.6 % (ref 19.6–45.3)
MAGNESIUM SERPL-MCNC: 2.4 MG/DL (ref 1.6–2.6)
MCH RBC QN AUTO: 30.8 PG (ref 26.6–33)
MCHC RBC AUTO-ENTMCNC: 31.7 G/DL (ref 31.5–35.7)
MCV RBC AUTO: 97 FL (ref 79–97)
MONOCYTES # BLD AUTO: 0.92 10*3/MM3 (ref 0.1–0.9)
MONOCYTES NFR BLD AUTO: 6.3 % (ref 5–12)
NEUTROPHILS NFR BLD AUTO: 10.68 10*3/MM3 (ref 1.7–7)
NEUTROPHILS NFR BLD AUTO: 72.5 % (ref 42.7–76)
NRBC BLD AUTO-RTO: 0 /100 WBC (ref 0–0.2)
PHOSPHATE SERPL-MCNC: 3.1 MG/DL (ref 2.5–4.5)
PLATELET # BLD AUTO: 689 10*3/MM3 (ref 140–450)
PMV BLD AUTO: 9.3 FL (ref 6–12)
POTASSIUM SERPL-SCNC: 4.7 MMOL/L (ref 3.5–5.2)
PROT SERPL-MCNC: 6.4 G/DL (ref 6–8.5)
RBC # BLD AUTO: 3.38 10*6/MM3 (ref 4.14–5.8)
SODIUM SERPL-SCNC: 137 MMOL/L (ref 136–145)
WBC # BLD AUTO: 14.72 10*3/MM3 (ref 3.4–10.8)

## 2021-02-02 PROCEDURE — 85025 COMPLETE CBC W/AUTO DIFF WBC: CPT | Performed by: COLON & RECTAL SURGERY

## 2021-02-02 PROCEDURE — 25010000002 HEPARIN (PORCINE) PER 1000 UNITS: Performed by: COLON & RECTAL SURGERY

## 2021-02-02 PROCEDURE — 83735 ASSAY OF MAGNESIUM: CPT

## 2021-02-02 PROCEDURE — 25010000002 PIPERACILLIN SOD-TAZOBACTAM PER 1 G

## 2021-02-02 PROCEDURE — 80053 COMPREHEN METABOLIC PANEL: CPT | Performed by: INTERNAL MEDICINE

## 2021-02-02 PROCEDURE — 84100 ASSAY OF PHOSPHORUS: CPT

## 2021-02-02 PROCEDURE — 82962 GLUCOSE BLOOD TEST: CPT

## 2021-02-02 RX ORDER — PANTOPRAZOLE SODIUM 40 MG/1
40 TABLET, DELAYED RELEASE ORAL
Status: DISCONTINUED | OUTPATIENT
Start: 2021-02-02 | End: 2021-02-04 | Stop reason: HOSPADM

## 2021-02-02 RX ADMIN — SODIUM CHLORIDE, PRESERVATIVE FREE 10 ML: 5 INJECTION INTRAVENOUS at 21:42

## 2021-02-02 RX ADMIN — TAZOBACTAM SODIUM AND PIPERACILLIN SODIUM 3.38 G: 375; 3 INJECTION, SOLUTION INTRAVENOUS at 21:39

## 2021-02-02 RX ADMIN — Medication 10 MG: at 23:42

## 2021-02-02 RX ADMIN — HYDROCODONE BITARTRATE AND ACETAMINOPHEN 1 TABLET: 7.5; 325 TABLET ORAL at 22:11

## 2021-02-02 RX ADMIN — PANTOPRAZOLE SODIUM 40 MG: 40 INJECTION, POWDER, LYOPHILIZED, FOR SOLUTION INTRAVENOUS at 09:57

## 2021-02-02 RX ADMIN — PANTOPRAZOLE SODIUM 40 MG: 40 TABLET, DELAYED RELEASE ORAL at 16:17

## 2021-02-02 RX ADMIN — HEPARIN SODIUM 5000 UNITS: 5000 INJECTION, SOLUTION INTRAVENOUS; SUBCUTANEOUS at 05:05

## 2021-02-02 RX ADMIN — TAMSULOSIN HYDROCHLORIDE 0.4 MG: 0.4 CAPSULE ORAL at 09:57

## 2021-02-02 RX ADMIN — ESCITALOPRAM OXALATE 10 MG: 10 TABLET ORAL at 09:57

## 2021-02-02 RX ADMIN — SODIUM CHLORIDE, POTASSIUM CHLORIDE, SODIUM LACTATE AND CALCIUM CHLORIDE 100 ML/HR: 600; 310; 30; 20 INJECTION, SOLUTION INTRAVENOUS at 06:47

## 2021-02-02 RX ADMIN — TAZOBACTAM SODIUM AND PIPERACILLIN SODIUM 3.38 G: 375; 3 INJECTION, SOLUTION INTRAVENOUS at 05:05

## 2021-02-02 RX ADMIN — SODIUM CHLORIDE, POTASSIUM CHLORIDE, SODIUM LACTATE AND CALCIUM CHLORIDE 100 ML/HR: 600; 310; 30; 20 INJECTION, SOLUTION INTRAVENOUS at 16:19

## 2021-02-02 RX ADMIN — SODIUM CHLORIDE 500 ML: 4.5 INJECTION, SOLUTION INTRAVENOUS at 09:58

## 2021-02-02 RX ADMIN — SODIUM CHLORIDE, PRESERVATIVE FREE 10 ML: 5 INJECTION INTRAVENOUS at 21:39

## 2021-02-02 RX ADMIN — HEPARIN SODIUM 5000 UNITS: 5000 INJECTION, SOLUTION INTRAVENOUS; SUBCUTANEOUS at 16:17

## 2021-02-02 RX ADMIN — HEPARIN SODIUM 5000 UNITS: 5000 INJECTION, SOLUTION INTRAVENOUS; SUBCUTANEOUS at 21:38

## 2021-02-02 RX ADMIN — TEMAZEPAM 15 MG: 15 CAPSULE ORAL at 23:42

## 2021-02-02 RX ADMIN — MICAFUNGIN SODIUM 100 MG: 100 INJECTION, POWDER, LYOPHILIZED, FOR SOLUTION INTRAVENOUS at 11:00

## 2021-02-02 RX ADMIN — TAMSULOSIN HYDROCHLORIDE 0.4 MG: 0.4 CAPSULE ORAL at 21:40

## 2021-02-02 RX ADMIN — TAZOBACTAM SODIUM AND PIPERACILLIN SODIUM 3.38 G: 375; 3 INJECTION, SOLUTION INTRAVENOUS at 16:18

## 2021-02-02 NOTE — PROGRESS NOTES
Pharmacy Parenteral Nutrition Evaluation    Craig Smalls is a 55 y.o. male receiving TPN.    Indication: Prolonged Paralytic Ileus  Consulting Physician: Dr. Montero  IVF: LR @ 100 mL/hr    Labs  Results from last 7 days   Lab Units 02/02/21 0410 02/01/21 0454 01/31/21  0631   SODIUM mmol/L 137 136 136   POTASSIUM mmol/L 4.7 4.1 4.4   CHLORIDE mmol/L 101 102 103   CO2 mmol/L 29.0 26.0 26.0   BUN mg/dL 14 15 14   CREATININE mg/dL 0.59* 0.63* 0.57*   CALCIUM mg/dL 8.9 8.4* 8.7   BILIRUBIN mg/dL 0.4 0.6 0.8   ALK PHOS U/L 88 82 91   ALT (SGPT) U/L 50* 40 38   AST (SGOT) U/L 38 35 34   GLUCOSE mg/dL 128* 119* 103*     Results from last 7 days   Lab Units 02/02/21 0410 02/01/21 0454 01/31/21  0631   MAGNESIUM mg/dL 2.4 2.4 2.4   PHOSPHORUS mg/dL 3.1 3.7 3.5   PREALBUMIN mg/dL  --  16.1*  --      Results from last 7 days   Lab Units 02/02/21 0410 02/01/21  0454 01/31/21  0631   WBC 10*3/mm3 14.72* 15.41* 21.42*   HEMOGLOBIN g/dL 10.4* 9.9* 10.8*   HEMATOCRIT % 32.8* 31.4* 34.0*   PLATELETS 10*3/mm3 689* 621* 622*     Triglycerides   Date Value Ref Range Status   01/26/2021 70 0 - 150 mg/dL Final     Comment:     Falsely depressed results may occur on samples drawn from patients receiving N-Acetylcysteine (NAC) or Metamizole.     estimated creatinine clearance is 135.4 mL/min (A) (by C-G formula based on SCr of 0.59 mg/dL (L)).    Intake & Output (last 3 days)         01/30 0701 - 01/31 0700 01/31 0701 - 02/01 0700 02/01 0701 - 02/02 0700 02/02 0701 - 02/03 0700    P.O. 600 1220 240     I.V. (mL/kg) 3524 (51.4) 2117 (30.3) 1058 (15.3)     NG/GT 20       IV Piggyback  40.4 100     TPN 1350 1369.2 837     Total Intake(mL/kg) 5494 (80.2) 4746.6 (68) 2235 (32.3)     Urine (mL/kg/hr) 1675 (1) 1100 (0.7) 1650 (1) 1050 (2.3)    Emesis/NG output 1200       Drains 60 20 50     Stool  100    Total Output 3760 1520 2700 1150    Net +1734 +3226.6 -465 -1150            Stool Unmeasured Occurrence 1 x              Dietitian Recommendations  Dietary Orders (From admission, onward)       Start     Ordered    21 182  Diet Regular; Low Fiber / Low Residue  Diet Effective Now     Question Answer Comment   Diet Texture / Consistency Regular    Common Modifiers Low Fiber / Low Residue        21 1825    21 1424  Dietary Nutrition Supplements Boost Glucose Control  Daily With Breakfast, Lunch & Dinner     Comments: Boost Glucose Control with each meal   Question:  Select Supplement:  Answer:  Boost Glucose Control    21 1423                  Results from last 7 days   Lab Units 21  1129 21  0802 21  0606 21  0011 21  2034 21  1728   GLUCOSE mg/dL 121 115 140* 129 120 135*     Lab Results   Lab Value Date/Time    HGBA1C 5.30 2021 1325     Current TPN Regimen      Dextrose 20% / Amino Acid 6%. 20% SMOF Lipid Emulsion 200mL every 24 hours.  Goal rate to be reduced by ~50% to 40 mL/hr today.  Na: 45 mEq/ L  K: 40 mEq/L  Ca: 5 mEq/L  Ma mEq/L  Phos: 15 mmol/ L  Chloride: Acetate  Max Chloride 1.24:1    Assessment/Plan:    1. Pharmacy to dose TPN for prolonged paralytic ileus starting .  Possible discharge home tomorrow or Thursday.  2. TPN continues today with macronutrients per RD recommendations as above. Will continue standard electrolyte concentrations in TPN today.       Per Dr. Montero, if patient eating, reduce PN to 50% of goal rate.  [Patient reportedly ate 1.5 donuts this morning and cottage cheese, peanut butter and crackers for lunch along with ONS]       Will reduce PN to rate of 40 mL/hr  3. Blood glucose controlled with low-dose correctional regular insulin. Exogenous electrolyte replacements will be ordered as needed per protocol.  4. Patient's diet has been advanced to clear liquids, with TPN continuing for now.  He has Boost Plus TID meals.      Patient also has active order for LR infusing at 100 mLhr.  5. Pharmacy will continue to follow  closely and make adjustments to TPN as necessary.    Thank you,    Adalgisa Flores, PharmD  2/2/2021  13:32 EST

## 2021-02-02 NOTE — PROGRESS NOTES
Continued Stay Note  [facility]     Patient Name: Craig Smalls  MRN: [id number]  Today's Date: 2/2/2021    Admit Date: 1/25/2021    Discharge Plan     Row Name 02/02/21 1457       Plan    Plan  discharge plan    Plan Comments  Spoke with [facility] and CM will need to notify them at discharge and fax discharge summary. CM will cont to follow,    Final Discharge Disposition Code  06 - home with home health care        Discharge Codes    No documentation.       Expected Discharge Date and Time     Expected Discharge Date Expected Discharge Time    Feb 4, 2021             Sonia Garcia RN

## 2021-02-02 NOTE — NURSING NOTE
WOC follow-up:    Appliance held last night.   However, medial appliance edge is loose and will likely leak in the very near future.   Decided to change appliance.     Stoma looks good.   High functioning at this time.   Peristomal skin is intact and dry.   Bilateral MC separation continues.   Placed a [place] Premier convex [employer] [id number] cut at 42mm.   Opening was cut off center, towards the medial edge.   This allowed for the appliance edge to seal on intact skin and not overlap on the open incision area.   Hopefully, this will prevent incisional drainage from leaking unto the appliance and thus causing short wear time.     Incision cleaned.   Dressing change performed.     Dr. Montero was at bedside and assessed midline incision.     Reviewed care needs with patient and spouse.  Discussed diet, protein intake, hydration, imodium use, and activity level.   Questions answered and encouragement provided.     Will follow-up tomorrow.   Possible discharge Thursday according to Dr. Montero.     Thanks

## 2021-02-02 NOTE — PROGRESS NOTES
[facility] Progress Note        Antibiotics:  Anti-Infectives (From admission, onward)    Ordered     Dose/Rate Route Frequency Start Stop    02/01/21 0947  piperacillin-tazobactam (ZOSYN) 3.375 g in iso-osmotic dextrose 50 ml (premix)     Ordering Provider: Adalgisa Flores PharmD    3.375 g  over 4 Hours Intravenous Every 8 Hours Scheduled 02/01/21 1400 02/14/21 1359    01/31/21 1319  piperacillin-tazobactam (ZOSYN) 3.375 g in iso-osmotic dextrose 50 ml (premix)     Ordering Provider: Andrei Cantrell MD    3.375 g  over 30 Minutes Intravenous Once 01/31/21 1415 01/31/21 1550    01/31/21 1319  DAPTOmycin (CUBICIN) 400 mg in sodium chloride 0.9 % 50 mL IVPB     Ordering Provider: Andrei Cantrell MD    6 mg/kg × 68.5 kg  100 mL/hr over 30 Minutes Intravenous Once 01/31/21 1415 01/31/21 1528    01/27/21 0823  micafungin 100 mg/100 mL 0.9% NS IVPB (mbp)     Andrei Cantrell MD reviewed the order on 02/01/21 0714.   Ordering Provider: Andrei Cantrell MD    100 mg  over 60 Minutes Intravenous Every 24 Hours 01/27/21 0900 02/10/21 0859    01/26/21 0827  micafungin 100 mg/100 mL 0.9% NS IVPB (mbp)     Ordering Provider: Andrei Cantrell MD    100 mg  over 60 Minutes Intravenous Once 01/26/21 1000 01/26/21 1052    01/26/21 0827  DAPTOmycin (CUBICIN) 400 mg in sodium chloride 0.9 % 50 mL IVPB     Ordering Provider: Andrei Cantrell MD    6 mg/kg × 68.5 kg  100 mL/hr over 30 Minutes Intravenous Once 01/26/21 0915 01/26/21 1145    01/25/21 2321  piperacillin-tazobactam (ZOSYN) 3.375 g in iso-osmotic dextrose 50 ml (premix)     Ordering Provider: Zane Gonzales MD    3.375 g  over 30 Minutes Intravenous Once 01/26/21 0015 01/26/21 0103    01/25/21 1657  piperacillin-tazobactam (ZOSYN) 3.375 g in iso-osmotic dextrose 50 ml (premix)     Ordering Provider: Cabrera Mayer MD    3.375 g Intravenous Once 01/25/21 1659 01/25/21 1755          CC: abd pain    HPI:    Patient is a 55 y.o.  Yr old male with history  of ulcerative colitis with prior Entyvio, although not for at least a month.  Admitted January 19 until January 23 for surgery by Dr. Montero; he underwent surgery on January 19 with proctocolectomy, total measle rectal excision with ileal pouch, protecting loop ileostomy and pouch/anal anastomosis with resection of accessory spleen. Drains removed prior to discharge.   Postoperative course had been complicated by drop in hematocrit postop day 2 per records with concern for possible hematoma.  In addition notes indicate JEFF stapler dysfunction but no evidence at the time for leak/extravasation; discharged  January 23.  Readmitted on January 25 with progressive abdominal pain, particularly in the left side lower quadrant.  Evidence for leukocytosis and fever to 100.6; CT scan of the abdomen with evidence for free air, high density material intermingled with abdominal ascites per radiology, but no discrete anastomotic leak seen per radiology.   Dilated small bowel loops noted  Per radiology.  Case discussed with Dr. Montero with  operative washout on January 26.     1/31/21 wbc increased, empiric change unasyn to zosyn;  daptmoycin x 1 ;  U/A and blood cultures sent    2/2/21 feeling better in general ;  Advancing diet;   fatigued;  postop Abdominal pain is less intense, intermittent, nonradiating,  Worse with movement and pressure,  2   out of 10 in severity, better with pain meds.  He denies hematochezia melena or hematemesis.    No headache photophobia or neck stiffness.  No shortness of breath  Hemoptysis; mild dry cough.  No dysuria hematuria or pyuria.   No other new skin rash     ROS:      2/2/21 No f/c/s. No n/v/d. No rash. No new ADR to Abx.     Constitutional-- No  chills or sweats.  Appetite diminished with fatigue  Heent-- No new vision, hearing or throat complaints.  No epistaxis or oral sores.  Denies odynophagia or dysphagia.  No flashers, floaters or eye pain. No odynophagia or dysphagia. No headache,  "photophobia or neck stiffness.  CV-- No chest pain, palpitation or syncope  Resp-- No SOB/cough/Hemoptysis  GI- +nausea; No vomiting, or diarrhea.  No hematochezia, melena, or hematemesis. Denies jaundice or chronic liver disease.  -- No dysuria, hematuria, or flank pain.  Denies hesitancy, urgency or flank pain.  Lymph- no swollen lymph nodes in neck/axilla or groin.   Heme- No active bruising; no Hx of DVT or PE.  MS-- no swelling or pain in the bones or joints of arms/legs.  No new back pain.  Neuro-- No acute focal weakness or numbness in the arms or legs.  No seizures.     Full 12 point review of systems reviewed and negative otherwise for acute complaints, except for above       PE:   /67   Pulse 78   Temp 97.8 °F (36.6 °C) (Infrared)   Resp 16   Ht 175.3 cm (69.02\")   Wt 69.3 kg (152 lb 12.8 oz)   SpO2 96%   BMI 22.55 kg/m²     GENERAL: sleepy  , in no acute distress.    HEENT: Normocephalic, atraumatic.  PERRL. EOMI. No conjunctival injection. No icterus. Oropharynx clear without evidence of thrush or exudate. No evidence of peridontal disease.    NECK: Supple without nuchal rigidity. No mass.  LYMPH: No cervical, axillary or inguinal lymphadenopathy.  HEART: RRR; No murmur, rubs, gallops.   LUNGS: diminished at bases , trace crackles bilaterally without wheezing,   rhonchi. Normal respiratory effort. Nonlabored. No dullness.  ABDOMEN: Soft, less tender, not distended. diminished bowel sounds.  NO mass or HSM.  EXT:  No cyanosis, clubbing or edema. No cord.  : Genitalia generally unremarkable.    MSK: FROM without joint effusions noted arms/legs.    SKIN: Warm and dry without cutaneous eruptions on Inspection/palpation.    NEURO: sleepy       No peripheral stigmata such phenomena of endocarditis     abd  surgical site without obvious purulence; wound grnaulating.  Minimal skin discoloration and no discrete mass/bulge or fluctuance. No new purulence     ostomy noted     IV without obvious " redness or drainage    Laboratory Data    Results from last 7 days   Lab Units 02/02/21  0410 02/01/21  0454 01/31/21  0631   WBC 10*3/mm3 14.72* 15.41* 21.42*   HEMOGLOBIN g/dL 10.4* 9.9* 10.8*   HEMATOCRIT % 32.8* 31.4* 34.0*   PLATELETS 10*3/mm3 689* 621* 622*     Results from last 7 days   Lab Units 02/02/21  0410   SODIUM mmol/L 137   POTASSIUM mmol/L 4.7   CHLORIDE mmol/L 101   CO2 mmol/L 29.0   BUN mg/dL 14   CREATININE mg/dL 0.59*   GLUCOSE mg/dL 128*   CALCIUM mg/dL 8.9     Results from last 7 days   Lab Units 02/02/21  0410   ALK PHOS U/L 88   BILIRUBIN mg/dL 0.4   ALT (SGPT) U/L 50*   AST (SGOT) U/L 38         Results from last 7 days   Lab Units 02/01/21  0454   CRP mg/dL 3.95*       Estimated Creatinine Clearance: 135.4 mL/min (A) (by C-G formula based on SCr of 0.59 mg/dL (L)).      Microbiology:      Radiology:  Imaging Results (Last 72 Hours)     ** No results found for the last 72 hours. **            Impression:     --acute postoperative IA abscess/infected hematoma, E Faecalis so far in culture;    Postoperative course with concern for blood loss and  hematoma.   radiology reports no confirmatory evidence for leak. Had operative intervention/washout  1/26   ; less cough and incentive spirometry encouraged;  No urinary symptoms, no other skin/soft tissue process present.  IV site appears okay.  Abdominal wall surgical site and stoma appear okay.  Monitor for other potential process    **culture with enterococcus  So far    --postop leukocytosis as below     --acute abd pain as above     --Ulcerative colitis with prior history immunosuppression/Biologics although not for at least a month preceding admission. Surgery as above on January 19 with colon resection     -- Acute postoperative anemia with concern for acute blood loss and hematoma as above       PLAN:     --IV  Zosyn/mycamine      --Checks/review labs cultures and scans     --Partial history per nursing staff     --Discussed with  microbiology       --Highly complex set of issues with high risk for further serious morbidity and other serious sequela     --Urinalysis noted and bland    --encouraged incentive spirometry    --d/w Dr DEAN and Dr Montero    --wbc trended up postop, but overall feeling better; d/w Dr DEAN, nursing  And Dr Montero;  Checked urinalysis 1/31;  No new resp symptoms, exam nonfocal and encouraged spirometry; abd improving and no new suppurative change at abd wall;  No new urinary symptoms; IF recurrent climb in wbc you may need to check further scans -v- further empiric abx adjustments -v- both/other workup; given improvements with zosyn/mycamine, continue for now      Andrei Cantrell MD  2/2/2021

## 2021-02-02 NOTE — PLAN OF CARE
Patient has been resting throughout the night. VSS. RA. NSR with occasional PVCs. Good ostomy and urine output. No complaints of pain throughout the shift. Will continue to monitor.       Problem: Adult Inpatient Plan of Care  Goal: Plan of Care Review  Outcome: Ongoing, Progressing  Flowsheets (Taken 2/2/2021 0305)  Progress: improving  Plan of Care Reviewed With: patient  Goal: Patient-Specific Goal (Individualized)  Outcome: Ongoing, Progressing  Goal: Absence of Hospital-Acquired Illness or Injury  Outcome: Ongoing, Progressing  Intervention: Identify and Manage Fall Risk  Recent Flowsheet Documentation  Taken 2/2/2021 0200 by Elle Betancourt, RN  Safety Promotion/Fall Prevention:   activity supervised   assistive device/personal items within reach   clutter free environment maintained   nonskid shoes/slippers when out of bed   room organization consistent   safety round/check completed  Taken 2/2/2021 0000 by Elle Betancourt, RN  Safety Promotion/Fall Prevention:   activity supervised   assistive device/personal items within reach   clutter free environment maintained   nonskid shoes/slippers when out of bed   room organization consistent   safety round/check completed  Taken 2/1/2021 2200 by Elle Betancourt, RN  Safety Promotion/Fall Prevention:   activity supervised   assistive device/personal items within reach   clutter free environment maintained   nonskid shoes/slippers when out of bed   room organization consistent   safety round/check completed  Taken 2/1/2021 2000 by Elle Betancourt, RN  Safety Promotion/Fall Prevention:   activity supervised   assistive device/personal items within reach   clutter free environment maintained   nonskid shoes/slippers when out of bed   room organization consistent   safety round/check completed  Intervention: Prevent Skin Injury  Recent Flowsheet Documentation  Taken 2/2/2021 0200 by Elle Betancourt, RN  Body Position: position changed independently  Taken 2/2/2021 0000 by  Elle Betancourt RN  Body Position: position changed independently  Taken 2/1/2021 2200 by Elle Betancourt RN  Body Position: position changed independently  Taken 2/1/2021 2000 by Elle Betancourt RN  Body Position: position changed independently  Intervention: Prevent and Manage VTE (venous thromboembolism) Risk  Recent Flowsheet Documentation  Taken 2/1/2021 2000 by Elle Betancourt RN  VTE Prevention/Management: (subQ heparin) bleeding risk factor(s) identified  Intervention: Prevent Infection  Recent Flowsheet Documentation  Taken 2/2/2021 0200 by Elle Betancourt RN  Infection Prevention:   single patient room provided   rest/sleep promoted   personal protective equipment utilized   hand hygiene promoted  Taken 2/2/2021 0000 by Elle Betancourt RN  Infection Prevention:   single patient room provided   rest/sleep promoted   personal protective equipment utilized   hand hygiene promoted  Taken 2/1/2021 2200 by Elle Betancourt RN  Infection Prevention:   single patient room provided   rest/sleep promoted   personal protective equipment utilized   hand hygiene promoted  Taken 2/1/2021 2000 by Elle Betancourt RN  Infection Prevention:   single patient room provided   rest/sleep promoted   personal protective equipment utilized   hand hygiene promoted  Goal: Optimal Comfort and Wellbeing  Outcome: Ongoing, Progressing  Intervention: Provide Person-Centered Care  Recent Flowsheet Documentation  Taken 2/1/2021 2000 by Elle Betancourt RN  Trust Relationship/Rapport:   care explained   choices provided   questions encouraged   questions answered   thoughts/feelings acknowledged  Goal: Readiness for Transition of Care  Outcome: Ongoing, Progressing     Problem: Pain Acute  Goal: Optimal Pain Control  Outcome: Ongoing, Progressing  Intervention: Prevent or Manage Pain  Recent Flowsheet Documentation  Taken 2/1/2021 2000 by Elle Betancourt RN  Sensory Stimulation Regulation:   care clustered   quiet environment  promoted  Sleep/Rest Enhancement:   awakenings minimized   consistent schedule promoted   regular sleep/rest pattern promoted   room darkened  Intervention: Optimize Psychosocial Wellbeing  Recent Flowsheet Documentation  Taken 2/1/2021 2000 by Elle Betancourt RN  Supportive Measures:   active listening utilized   self-care encouraged  Diversional Activities:   smartphone   television     Problem: Skin Injury Risk Increased  Goal: Skin Health and Integrity  Outcome: Ongoing, Progressing  Intervention: Optimize Skin Protection  Recent Flowsheet Documentation  Taken 2/2/2021 0200 by Elle Betancourt RN  Pressure Reduction Techniques:   frequent weight shift encouraged   weight shift assistance provided  Head of Bed (HOB): [facility] [place] elevated  Pressure Reduction Devices: pressure-redistributing mattress utilized  Skin Protection:   adhesive use limited   incontinence pads utilized  Taken 2/2/2021 0000 by Elle Betancourt RN  Pressure Reduction Techniques:   weight shift assistance provided   frequent weight shift encouraged  Head of Bed (HOB): [facility] [place] elevated  Pressure Reduction Devices: pressure-redistributing mattress utilized  Skin Protection:   adhesive use limited   incontinence pads utilized  Taken 2/1/2021 2200 by Elle Betancourt RN  Pressure Reduction Techniques:   weight shift assistance provided   frequent weight shift encouraged  Head of Bed (HOB): [facility] [place] elevated  Pressure Reduction Devices: pressure-redistributing mattress utilized  Skin Protection:   adhesive use limited   incontinence pads utilized  Taken 2/1/2021 2000 by Elle Betancourt RN  Pressure Reduction Techniques:   weight shift assistance provided   frequent weight shift encouraged  Head of Bed (HOB): [facility] [place] elevated  Pressure Reduction Devices: pressure-redistributing mattress utilized  Skin Protection:   adhesive use limited   incontinence pads utilized     Problem: Fall Injury Risk  Goal: Absence of Fall and Fall-Related Injury  Outcome: Ongoing,  Progressing  Intervention: Identify and Manage Contributors to Fall Injury Risk  Recent Flowsheet Documentation  Taken 2/2/2021 0200 by Elle Betancourt, RN  Medication Review/Management: medications reviewed  Taken 2/2/2021 0000 by Elle Betancourt RN  Medication Review/Management: medications reviewed  Taken 2/1/2021 2200 by Elle Betancourt, RN  Medication Review/Management: medications reviewed  Taken 2/1/2021 2000 by Elle Betancourt, RN  Medication Review/Management: medications reviewed  Intervention: Promote Injury-Free Environment  Recent Flowsheet Documentation  Taken 2/2/2021 0200 by Elle Betancourt RN  Safety Promotion/Fall Prevention:   activity supervised   assistive device/personal items within reach   clutter free environment maintained   nonskid shoes/slippers when out of bed   room organization consistent   safety round/check completed  Taken 2/2/2021 0000 by Elle Betancourt RN  Safety Promotion/Fall Prevention:   activity supervised   assistive device/personal items within reach   clutter free environment maintained   nonskid shoes/slippers when out of bed   room organization consistent   safety round/check completed  Taken 2/1/2021 2200 by Elle Betancourt RN  Safety Promotion/Fall Prevention:   activity supervised   assistive device/personal items within reach   clutter free environment maintained   nonskid shoes/slippers when out of bed   room organization consistent   safety round/check completed  Taken 2/1/2021 2000 by Elle Betancourt, RN  Safety Promotion/Fall Prevention:   activity supervised   assistive device/personal items within reach   clutter free environment maintained   nonskid shoes/slippers when out of bed   room organization consistent   safety round/check completed   Goal Outcome Evaluation:  Plan of Care Reviewed With: patient  Progress: improving

## 2021-02-03 LAB
ANION GAP SERPL CALCULATED.3IONS-SCNC: 9 MMOL/L (ref 5–15)
BASOPHILS # BLD AUTO: 0.07 10*3/MM3 (ref 0–0.2)
BASOPHILS NFR BLD AUTO: 0.7 % (ref 0–1.5)
BUN SERPL-MCNC: 14 MG/DL (ref 6–20)
BUN/CREAT SERPL: 21.9 (ref 7–25)
CALCIUM SPEC-SCNC: 8.6 MG/DL (ref 8.6–10.5)
CHLORIDE SERPL-SCNC: 102 MMOL/L (ref 98–107)
CO2 SERPL-SCNC: 25 MMOL/L (ref 22–29)
CREAT SERPL-MCNC: 0.64 MG/DL (ref 0.76–1.27)
DEPRECATED RDW RBC AUTO: 45.7 FL (ref 37–54)
EOSINOPHIL # BLD AUTO: 0.64 10*3/MM3 (ref 0–0.4)
EOSINOPHIL NFR BLD AUTO: 6 % (ref 0.3–6.2)
ERYTHROCYTE [DISTWIDTH] IN BLOOD BY AUTOMATED COUNT: 12.8 % (ref 12.3–15.4)
GFR SERPL CREATININE-BSD FRML MDRD: 129 ML/MIN/1.73
GLUCOSE BLDC GLUCOMTR-MCNC: 104 MG/DL (ref 70–130)
GLUCOSE BLDC GLUCOMTR-MCNC: 112 MG/DL (ref 70–130)
GLUCOSE BLDC GLUCOMTR-MCNC: 124 MG/DL (ref 70–130)
GLUCOSE BLDC GLUCOMTR-MCNC: 124 MG/DL (ref 70–130)
GLUCOSE SERPL-MCNC: 107 MG/DL (ref 65–99)
HCT VFR BLD AUTO: 31.3 % (ref 37.5–51)
HGB BLD-MCNC: 10 G/DL (ref 13–17.7)
IMM GRANULOCYTES # BLD AUTO: 0.18 10*3/MM3 (ref 0–0.05)
IMM GRANULOCYTES NFR BLD AUTO: 1.7 % (ref 0–0.5)
LYMPHOCYTES # BLD AUTO: 2.29 10*3/MM3 (ref 0.7–3.1)
LYMPHOCYTES NFR BLD AUTO: 21.3 % (ref 19.6–45.3)
MAGNESIUM SERPL-MCNC: 2.2 MG/DL (ref 1.6–2.6)
MCH RBC QN AUTO: 31.5 PG (ref 26.6–33)
MCHC RBC AUTO-ENTMCNC: 31.9 G/DL (ref 31.5–35.7)
MCV RBC AUTO: 98.7 FL (ref 79–97)
MONOCYTES # BLD AUTO: 0.98 10*3/MM3 (ref 0.1–0.9)
MONOCYTES NFR BLD AUTO: 9.1 % (ref 5–12)
NEUTROPHILS NFR BLD AUTO: 6.57 10*3/MM3 (ref 1.7–7)
NEUTROPHILS NFR BLD AUTO: 61.2 % (ref 42.7–76)
NRBC BLD AUTO-RTO: 0 /100 WBC (ref 0–0.2)
PHOSPHATE SERPL-MCNC: 3.4 MG/DL (ref 2.5–4.5)
PLATELET # BLD AUTO: 659 10*3/MM3 (ref 140–450)
PMV BLD AUTO: 9.3 FL (ref 6–12)
POTASSIUM SERPL-SCNC: 4 MMOL/L (ref 3.5–5.2)
RBC # BLD AUTO: 3.17 10*6/MM3 (ref 4.14–5.8)
SODIUM SERPL-SCNC: 136 MMOL/L (ref 136–145)
WBC # BLD AUTO: 10.73 10*3/MM3 (ref 3.4–10.8)

## 2021-02-03 PROCEDURE — 25010000002 HEPARIN (PORCINE) PER 1000 UNITS: Performed by: COLON & RECTAL SURGERY

## 2021-02-03 PROCEDURE — 25010000002 MICAFUNGIN SODIUM 100 MG RECONSTITUTED SOLUTION: Performed by: INTERNAL MEDICINE

## 2021-02-03 PROCEDURE — 84100 ASSAY OF PHOSPHORUS: CPT

## 2021-02-03 PROCEDURE — 83735 ASSAY OF MAGNESIUM: CPT

## 2021-02-03 PROCEDURE — 25010000002 PIPERACILLIN SOD-TAZOBACTAM PER 1 G

## 2021-02-03 PROCEDURE — 97165 OT EVAL LOW COMPLEX 30 MIN: CPT

## 2021-02-03 PROCEDURE — 97161 PT EVAL LOW COMPLEX 20 MIN: CPT | Performed by: PHYSICAL THERAPIST

## 2021-02-03 PROCEDURE — 85025 COMPLETE CBC W/AUTO DIFF WBC: CPT | Performed by: COLON & RECTAL SURGERY

## 2021-02-03 PROCEDURE — 82962 GLUCOSE BLOOD TEST: CPT

## 2021-02-03 PROCEDURE — 80048 BASIC METABOLIC PNL TOTAL CA: CPT

## 2021-02-03 RX ADMIN — MICAFUNGIN SODIUM 100 MG: 100 INJECTION, POWDER, LYOPHILIZED, FOR SOLUTION INTRAVENOUS at 09:36

## 2021-02-03 RX ADMIN — TAZOBACTAM SODIUM AND PIPERACILLIN SODIUM 3.38 G: 375; 3 INJECTION, SOLUTION INTRAVENOUS at 16:09

## 2021-02-03 RX ADMIN — HEPARIN SODIUM 5000 UNITS: 5000 INJECTION, SOLUTION INTRAVENOUS; SUBCUTANEOUS at 05:40

## 2021-02-03 RX ADMIN — HYDROCODONE BITARTRATE AND ACETAMINOPHEN 1 TABLET: 7.5; 325 TABLET ORAL at 10:24

## 2021-02-03 RX ADMIN — PANTOPRAZOLE SODIUM 40 MG: 40 TABLET, DELAYED RELEASE ORAL at 09:35

## 2021-02-03 RX ADMIN — SODIUM CHLORIDE, PRESERVATIVE FREE 10 ML: 5 INJECTION INTRAVENOUS at 09:36

## 2021-02-03 RX ADMIN — SODIUM CHLORIDE, PRESERVATIVE FREE 10 ML: 5 INJECTION INTRAVENOUS at 21:03

## 2021-02-03 RX ADMIN — HEPARIN SODIUM 5000 UNITS: 5000 INJECTION, SOLUTION INTRAVENOUS; SUBCUTANEOUS at 16:08

## 2021-02-03 RX ADMIN — TAZOBACTAM SODIUM AND PIPERACILLIN SODIUM 3.38 G: 375; 3 INJECTION, SOLUTION INTRAVENOUS at 05:40

## 2021-02-03 RX ADMIN — HYDROCODONE BITARTRATE AND ACETAMINOPHEN 1 TABLET: 7.5; 325 TABLET ORAL at 18:11

## 2021-02-03 RX ADMIN — TAZOBACTAM SODIUM AND PIPERACILLIN SODIUM 3.38 G: 375; 3 INJECTION, SOLUTION INTRAVENOUS at 21:03

## 2021-02-03 RX ADMIN — ESCITALOPRAM OXALATE 10 MG: 10 TABLET ORAL at 09:35

## 2021-02-03 RX ADMIN — TAMSULOSIN HYDROCHLORIDE 0.4 MG: 0.4 CAPSULE ORAL at 09:35

## 2021-02-03 RX ADMIN — SODIUM CHLORIDE, POTASSIUM CHLORIDE, SODIUM LACTATE AND CALCIUM CHLORIDE 100 ML/HR: 600; 310; 30; 20 INJECTION, SOLUTION INTRAVENOUS at 02:19

## 2021-02-03 RX ADMIN — PANTOPRAZOLE SODIUM 40 MG: 40 TABLET, DELAYED RELEASE ORAL at 17:30

## 2021-02-03 NOTE — PLAN OF CARE
Pt rested most of night. JANNET with minimal output. Pain controlled with PRNs. Monitoring.      Problem: Adult Inpatient Plan of Care  Goal: Plan of Care Review  Outcome: Ongoing, Progressing  Flowsheets (Taken 2/3/2021 0525)  Plan of Care Reviewed With: patient  Goal: Patient-Specific Goal (Individualized)  Outcome: Ongoing, Progressing  Goal: Absence of Hospital-Acquired Illness or Injury  Outcome: Ongoing, Progressing  Intervention: Identify and Manage Fall Risk  Recent Flowsheet Documentation  Taken 2/3/2021 0400 by Elena De Anda RN  Safety Promotion/Fall Prevention:   activity supervised   assistive device/personal items within reach   clutter free environment maintained   fall prevention program maintained   nonskid shoes/slippers when out of bed   safety round/check completed  Taken 2/3/2021 0200 by Elena De Anda RN  Safety Promotion/Fall Prevention:   activity supervised   assistive device/personal items within reach   clutter free environment maintained   fall prevention program maintained   nonskid shoes/slippers when out of bed   safety round/check completed  Intervention: Prevent Skin Injury  Recent Flowsheet Documentation  Taken 2/3/2021 0400 by Elena De Anda, RN  Body Position: position changed independently  Taken 2/3/2021 0200 by Elena De Anda RN  Body Position: position changed independently  Goal: Optimal Comfort and Wellbeing  Outcome: Ongoing, Progressing  Goal: Readiness for Transition of Care  Outcome: Ongoing, Progressing   Goal Outcome Evaluation:  Plan of Care Reviewed With: patient

## 2021-02-03 NOTE — PROGRESS NOTES
"IM progress note      Craig Smalls  [id number]  1965     LOS: 9 days     Attending: Zane Gonzales MD    Primary Care Provider: Albert Delgado MD      Chief Complaint/Reason for visit:  Abd pain    Subjective   Doing well.  Denies pain.  Ambulating in halls.  Tolerating diet.  Wants to go home. Denies f/c/n/v/sob/cp.    Objective       Visit Vitals  /71 (BP Location: Left arm, Patient Position: Lying)   Pulse 77   Temp 98.6 °F (37 °C) (Oral)   Resp 18   Ht 175.3 cm (69.02\")   Wt 69 kg (152 lb 1.8 oz)   SpO2 96%   BMI 22.45 kg/m²     Temp (24hrs), Av.6 °F (37 °C), Min:98 °F (36.7 °C), Max:98.9 °F (37.2 °C)      Respiratory: RA    Physical Exam:     General Appearance:    Alert, cooperative, in no acute distress   Head:    Normocephalic, without obvious abnormality, atraumatic.     Lungs:     Normal effort, symmetric chest rise, no crepitus, clear to      auscultation bilaterally             Heart:    Regular rhythm and normal rate, normal S1 and S2   Abdomen:     Soft, expected tenderness. Incision CDI. Stoma pink, with liquid output. JANNET present x2 (RLQ, LLQ)     Extremities:   No clubbing, cyanosis or edema.  No deformities.    Pulses:   Pulses palpable and equal bilaterally   Skin:   No bleeding, bruising or rash          Results Review:     I reviewed the patient's new clinical results.   Results from last 7 days   Lab Units 21   WBC 10*3/mm3 10.73 14.72* 15.41*   HEMOGLOBIN g/dL 10.0* 10.4* 9.9*   HEMATOCRIT % 31.3* 32.8* 31.4*   PLATELETS 10*3/mm3 659* 689* 621*     Results from last 7 days   Lab Units 21  0631   SODIUM mmol/L 136 137 136 136   POTASSIUM mmol/L 4.0 4.7 4.1 4.4   CHLORIDE mmol/L 102 101 102 103   CO2 mmol/L 25.0 29.0 26.0 26.0   BUN mg/dL 14 14 15 14   CREATININE mg/dL 0.64* 0.59* 0.63* 0.57*   CALCIUM mg/dL 8.6 8.9 8.4* 8.7   BILIRUBIN mg/dL  --  0.4 0.6 0.8   ALK PHOS U/L  --  " 88 82 91   ALT (SGPT) U/L  --  50* 40 38   AST (SGOT) U/L  --  38 35 34   GLUCOSE mg/dL 107* 128* 119* 103*     01/26/2021 2157 01/29/2021 0645 Body Fluid Culture - Peritoneal Fluid, Peritoneum [[id number]]    (Abnormal)   Peritoneal Fluid from Peritoneum    Final result Component Value   Body Fluid Culture Scant growth (1+) Enterococcus faecalisAbnormal    Gram Stain Few (2+) WBCs seen    No organisms seen       Susceptibility     Enterococcus faecalis     SAMI     Ampicillin <=2  Susceptible     Gentamicin High Level Synergy SYN-S  Susceptible     Vancomycin 2  Susceptible            Linear View                  I reviewed the patient's new imaging including images and reports.    All medications reviewed.   escitalopram, 10 mg, Oral, Daily  heparin (porcine), 5,000 Units, Subcutaneous, Q8H  insulin regular, 2-7 Units, Subcutaneous, Q6H  micafungin (MYCAMINE) IV, 100 mg, Intravenous, Q24H  pantoprazole, 40 mg, Oral, BID AC  piperacillin-tazobactam, 3.375 g, Intravenous, Q8H  sodium chloride, 10 mL, Intravenous, Q12H  sodium chloride, 10 mL, Intravenous, Q12H  tamsulosin, 0.4 mg, Oral, BID        Assessment/Plan     S/P Exploratory laparotomy, Washout of hematoma, cultures obtained- without gross evidence of significant ileus character or infection, MARCUS, Bilateral lower abdominal JANNET drain placements.    S/P proctocolectomy( total mesorectal excision, ileal pouch, pouch anal anastomosis, protecting loop ileostomy, resection of accessory spleen)       Small bowel obstruction (CMS/HCC)    Benign prostatic hyperplasia without lower urinary tract symptoms  Volume depletion.  History of ulcerative colitis, history of biologic treatment.  Leukocytosis.  Intraabdominal hematoma with enterococcus infection.    Plan    1. Ambulation, several times daily.  2. Pain control-prns   3. IS-encouraged  4. DVT proph- mechs/heparin    WOCN for stoma care/ongoing    [facility], Dr Cantrell, abx management, watching WBC trending down.    NG  tube is out.  Diet advance.    Nutrition: TPN . Po as able.  Wean TPN     Restoril, melatonin to help with sleep.    Prn chlorpromazine ( stopped zofran and as needed Haldol)    DC home when TPN off and abx arrangements complete      Jessica Patino, APRN  02/03/21  12:45 EST

## 2021-02-03 NOTE — PLAN OF CARE
Goal Outcome Evaluation:  Plan of Care Reviewed With: patient, spouse     Outcome Summary: PT evaluation completed.  Pt demonstrated independence with bed mobility, transfers, 500 feet of gait without AD, and ambulating up/down 5 stairs without a HR - no unsteadiness or LOB noted.  Pt has no concerns re: mobility or safety once d/c'd.  Pt has been ambulating in the hallways with spouse multiple times a day.  PT will sign off.  Recommend home at d/c.

## 2021-02-03 NOTE — PROGRESS NOTES
Clinical Nutrition   Reason For Visit: Follow-up protocol, PN/PO    Patient Name: Craig Smalls  YOB: 1965  MRN: [id number]  Date of Encounter: 02/03/21 13:41 EST  Admission date: 1/25/2021      Patient eating well without issues. Had donuts this morning. Waiting on lunch to arrive and eating a popsicle. Drinking 3 Boost Glucose Control supplements daily.    RD recommends weaning TPN off today.    Nutrition Assessment     Admission Problem List:  Recent previous admission: Ulcerative colitis s/p proctocolectomy, total mesorectal excision, ileal pouch, anal anastomosis, protecting loop ileostomy (1/19/21)    Abdominal pain  High output ileostomy  Some degree of ileus  Mild dilation small bowel  Acute post-op fever/leukocytosis  Pelvic hematoma  Intra-abdominal fluid collection      Applicable PMH:  Ulcerative colitis      Applicable medical tests/procedures since admission:  (1/25) NGT to L[facility] initiated  (1/26) s/p exploratory laparotomy, washout of hematoma, no evidence of ileus or infection, lysis of adhesions, bilateral lower abdominal JANNET drains placed; PICC placed and TPN initiated  (1/31) NGT to L[facility] discontinued       Reported/Observed/Food/Nutrition Related History   I/Os within past 24 hours:  Ileostomy output = 800 ml  UOP = 2750 ml  Drain output = 85 ml    Patient and wife present during visit. Patient reports he is eating fairly well and tolerating oral intake. States he didn't eat much last night because he didn't like the food. Ate donuts from [email or web address] this morning that wife brought in. Eating a popsicle and waiting on lunch to arrive. States he has been drinking 3 Boost GC supplements daily. Patient shares that he eats fish but does not eat chicken or beef. Does not drink milk but does eat cottage cheese and yogurt. Loves peanut butter. Plans to drink Boost/Ensure or Muscle Milk ONS at home. Understands that he needs to consume adequate protein at home.    Anthropometrics    Height: 69 in  Weight: 152 lbs (bed scale wt 2/3 per nsg doc)  BMI: 22.5  BMI classification: Normal: 18.5-24.9kg/m2   IBW: 160 lbs    UBW: 165 lbs (standing wt 1/18/21 per EMR)  Weight change: ? Unintentional weight loss of 13 lbs x 2.5 weeks    Labs reviewed   Labs reviewed: Yes    Medications reviewed   Medications reviewed: Yes  Pertinent: antibiotic, insulin, protonix  GTT: LR @ 100 ml/hr    Needs Assessment (1/27)   Height used: 69 in/175.3 cm  Weight used: 159 lbs/72 kg (actual wt)    Estimated Calories needs: ~2000 calories daily  25 kcal/kg = 1800  MSJ = 1598 x 1.3 = 2077    Estimated Protein needs: ~108 g protein  1.2-1.5 g/kg =     Current Nutrition Prescription   PO: Low Fiber  Oral Nutrition Supplement: Boost GC 3x daily    PN: D20%, AA6% @ 40 ml/hr.  Route: PICC  Nutrition provided at goal rate: 960 ml, 885 calories (44% est needs), 58 g protein (54% est needs), GIR = 1.9 mg/kg/min    Evaluation of Received Nutrient/Fluid Intake:  PO- 100% at breakfast this morning  TPN- insufficient data    Nutrition Diagnosis     1/28, 2/1, 2/3  Problem Inadequate oral intake   Etiology Altered GI function   Signs/Symptoms Diet recently advanced from full liquids to solids; did not eat well last night at dinner due to disliking the food; ate donuts that wife brought in this morning; states he is drinking Boost GC with each meal   Status: ongoing/improving    1/28  Problem Food and nutrition knowledge deficit   Etiology No prior receipt of education   Signs/Symptoms Patient with new ileostomy (created 1/19/21) and did not receive ileostomy nutrition education after surgery   Status: resolved 1/29 - RD provided education    Intervention   Intervention: Follow treatment progress, Care plan reviewed, Encourage intake     -RD recommends weaning TPN to off today.    Goal:   General: Nutrition support treatment  PO: Tolerate PO, Increase intake, Advace diet as medically appropriate  EN/PN: PN to  PO    Monitoring/Evaluation:   Monitoring/Evaluation: Per protocol, I&O, PO intake, Supplement intake, Pertinent labs, PN delivery/tolerance, Weight, GI status, Symptoms    Harriet Huston RD  Time Spent: 45 min

## 2021-02-03 NOTE — PROGRESS NOTES
Continued Stay Note  [facility]     Patient Name: Craig Smalls  MRN: [id number]  Today's Date: 2/3/2021    Admit Date: 1/25/2021    Discharge Plan     Row Name 02/03/21 1601       Plan    Plan  discharge plan    Plan Comments  SPoke with pt and pt's spouse in room and plan remains home with spouse and HH arranged with [facility]. Pt not medically ready for discharge as pt is being weaned off TPN. [facility] following and may need outpatient IV antibiotics pending [facility]'s final recommendations,. CM will cont to follow.    Final Discharge Disposition Code  06 - home with home health care        Discharge Codes    No documentation.       Expected Discharge Date and Time     Expected Discharge Date Expected Discharge Time    Feb 4, 2021             Sonia Garcia RN

## 2021-02-03 NOTE — PROGRESS NOTES
"IM progress note      Craig Smalls  [id number]  1965     LOS: 8 days     Attending: Zane Gonzales MD    Primary Care Provider: Albert Delgado MD      Chief Complaint/Reason for visit:  Abd pain    Subjective   Had a good day.  Tolerated soft p.o. diet.  Has stoma output.  Voiding without difficulty.  Good pain control.  No nausea or vomiting or shortness of breath.    Objective       Visit Vitals  /79   Pulse 90   Temp 97.5 °F (36.4 °C) (Infrared)   Resp 18   Ht 175.3 cm (69.02\")   Wt 69.3 kg (152 lb 12.8 oz)   SpO2 96%   BMI 22.55 kg/m²     Temp (24hrs), Av °F (36.7 °C), Min:97.5 °F (36.4 °C), Max:98.4 °F (36.9 °C)      Respiratory: RA    Physical Exam:     General Appearance:    Alert, cooperative, in no acute distress   Head:    Normocephalic, without obvious abnormality, atraumatic. NGT present bilious output with some blood    Lungs:     Normal effort, symmetric chest rise, no crepitus, clear to      auscultation bilaterally             Heart:    Regular rhythm and normal rate, normal S1 and S2   Abdomen:     Soft, expected tenderness. Incision CDI. Stoma pink, with liquid output. JANNET present x2 (RLQ, LLQ)       Extremities:   No clubbing, cyanosis or edema.  No deformities.    Pulses:   Pulses palpable and equal bilaterally   Skin:   No bleeding, bruising or rash          Results Review:     I reviewed the patient's new clinical results.   Results from last 7 days   Lab Units 21  0631   WBC 10*3/mm3 14.72* 15.41* 21.42*   HEMOGLOBIN g/dL 10.4* 9.9* 10.8*   HEMATOCRIT % 32.8* 31.4* 34.0*   PLATELETS 10*3/mm3 689* 621* 622*     Results from last 7 days   Lab Units 21  0631   SODIUM mmol/L 137 136 136   POTASSIUM mmol/L 4.7 4.1 4.4   CHLORIDE mmol/L 101 102 103   CO2 mmol/L 29.0 26.0 26.0   BUN mg/dL 14 15 14   CREATININE mg/dL 0.59* 0.63* 0.57*   CALCIUM mg/dL 8.9 8.4* 8.7   BILIRUBIN mg/dL 0.4 0.6 0.8   ALK PHOS " U/L 88 82 91   ALT (SGPT) U/L 50* 40 38   AST (SGOT) U/L 38 35 34   GLUCOSE mg/dL 128* 119* 103*     01/26/2021 2157 01/29/2021 0645 Body Fluid Culture - Peritoneal Fluid, Peritoneum [[id number]]    (Abnormal)   Peritoneal Fluid from Peritoneum    Final result Component Value   Body Fluid Culture Scant growth (1+) Enterococcus faecalisAbnormal    Gram Stain Few (2+) WBCs seen    No organisms seen       Susceptibility     Enterococcus faecalis     SAMI     Ampicillin <=2  Susceptible     Gentamicin High Level Synergy SYN-S  Susceptible     Vancomycin 2  Susceptible            Linear View                  I reviewed the patient's new imaging including images and reports.    All medications reviewed.   escitalopram, 10 mg, Oral, Daily  heparin (porcine), 5,000 Units, Subcutaneous, Q8H  insulin regular, 2-7 Units, Subcutaneous, Q6H  micafungin (MYCAMINE) IV, 100 mg, Intravenous, Q24H  pantoprazole, 40 mg, Oral, BID AC  piperacillin-tazobactam, 3.375 g, Intravenous, Q8H  sodium chloride, 10 mL, Intravenous, Q12H  sodium chloride, 10 mL, Intravenous, Q12H  tamsulosin, 0.4 mg, Oral, BID        Assessment/Plan     S/P Exploratory laparotomy, Washout of hematoma, cultures obtained- without gross evidence of significant ileus character or infection, MARCUS, Bilateral lower abdominal JANNET drain placements.    S/P proctocolectomy( total mesorectal excision, ileal pouch, pouch anal anastomosis, protecting loop ileostomy, resection of accessory spleen)       Small bowel obstruction (CMS/HCC)    Benign prostatic hyperplasia without lower urinary tract symptoms  Volume depletion.  History of ulcerative colitis, history of biologic treatment.  Leukocytosis.  Intraabdominal hematoma with enterococcus infection.    Plan    1. Ambulation, several times daily.  2. Pain control-prns   3. IS-encouraged  4. DVT proph- mechs/heparin    WOCN for stoma care/ongoing    [facility], Dr Cantrell, abx management, watching WBC trending down.    NG tube is out.   Diet advance.    Nutrition: TPN . Po as able.  Wean TPN soon    Restoril, melatonin to help with sleep.    Prn chlorpromazine ( stopped zofran and as needed Haldol)    D/w pt.     Zane Gonzales MD  02/02/21  21:00 EST

## 2021-02-03 NOTE — NURSING NOTE
WOC follow-up:     Appliance looks good.   Some soiling noted on medial edge r/t incisional drainage.   Will leave appliance as is.   Should be able to get 48 hours of wear time.     Patient ambulation well.   Patient tolerating PO.   Hopefully, TPN can be discontinued today.     Will plan to change appliance and provide discharge supplies tomorrow AM.     Thanks

## 2021-02-03 NOTE — THERAPY DISCHARGE NOTE
Patient Name: Craig Smalls  : 1965    MRN: [id number]                              Today's Date: 2/3/2021       Admit Date: 2021    Visit Dx:     ICD-10-CM ICD-9-CM   1. Small bowel obstruction (CMS/HCC)  K56.609 560.9   2. Postoperative abdominal pain  R10.9 789.00    G89.18 338.18   3. Hematoma  T14.8XXA 924.9   4. S/P proctocolectomy( total mesorectal excision, ileal pouch, pouch anal anastomosis, protecting loop ileostomy, resection of accessory spleen)     Z90.49 V45.79     V45.72   5. S/P Exploratory laparotomy, Washout of hematoma, cultures obtained- without gross evidence of significant ileus character or infection, MARCUS, Bilateral lower abdominal JANNET drain placements.  Z98.890 V45.89     Patient Active Problem List   Diagnosis   • Chronic ulcerative colitis (CMS/HCC)   • S/P proctocolectomy( total mesorectal excision, ileal pouch, pouch anal anastomosis, protecting loop ileostomy, resection of accessory spleen)      • Leukocytosis, likely reactive   • Acute blood loss anemia   • Hyperkalemia, likely related to IVF   • Small bowel obstruction (CMS/HCC)   • Benign prostatic hyperplasia without lower urinary tract symptoms   • S/P Exploratory laparotomy, Washout of hematoma, cultures obtained- without gross evidence of significant ileus character or infection, MARCUS, Bilateral lower abdominal JANNET drain placements.     Past Medical History:   Diagnosis Date   • Enlarged prostate    • Ulcerative colitis, chronic (CMS/HCC)    • Wears glasses     Reading    • Wears hearing aid     non-compliant at times   • Wears partial dentures     UPPER PARTIAL     Past Surgical History:   Procedure Laterality Date   • ABDOMINAL PERINEAL RESECTION N/A 2021    Procedure: Proctocolectomy, total mesorectal excision, Ileal pouch, Pouch anal anastomosis, Protecting loop ileostomy, Resection of accessory spleen;  Surgeon: Silverio Montero MD;  Location: [facility];  Service: General;  Laterality: N/A;   • COLON  RESECTION N/A 1/26/2021    Procedure: SURGICAL EXPLORATION AND DRAIN PLACEMENT, LYSIS OF ADHESIONS;  Surgeon: Silverio Montero MD;  Location: [facility];  Service: General;  Laterality: N/A;   • COLONOSCOPY  2020     General Information     Row Name 02/03/21 1047          Physical Therapy Time and Intention    Document Type  discharge evaluation/summary  -LM     Mode of Treatment  individual therapy;physical therapy  -LM     Row Name 02/03/21 1047          General Information    Patient Profile Reviewed  yes  -LM     Prior Level of Function  independent:;all household mobility;gait;ADL's  -LM     Existing Precautions/Restrictions  other (see comments) 2 JANNET Drains; Ileostomy  -LM     Barriers to Rehab  none identified  -LM     Row Name 02/03/21 1047          Living Environment    Lives With  spouse  -LM     Row Name 02/03/21 1047          Home Main Entrance    Number of Stairs, Main Entrance  eight  -LM     Stair Railings, Main Entrance  railings on both sides of stairs  -LM     Row Name 02/03/21 1047          Stairs Within Home, Primary    Stairs, Within Home, Primary  into bathroom  -LM     Number of Stairs, Within Home, Primary  two  -LM     Stair Railings, Within Home, Primary  railings on both sides of stairs  -LM     Row Name 02/03/21 1047          Cognition    Orientation Status (Cognition)  oriented x 4  -LM       User Key  (r) = Recorded By, (t) = Taken By, (c) = Cosigned By    Initials Name Provider Type    LM Malika Orona PT Physical Therapist        Mobility     Row Name 02/03/21 1047          Bed Mobility    Bed Mobility  supine-sit;sit-supine  -LM     Supine-Sit [place] (Bed Mobility)  independent  -LM     Sit-Supine [place] (Bed Mobility)  independent  -LM     Row Name 02/03/21 1047          Sit-Stand Transfer    Sit-Stand [place] (Transfers)  independent  -LM     Assistive Device (Sit-Stand Transfers)  -- No AD  -LM     Row Name 02/03/21 1047          Gait/Stairs (Locomotion)     [place] Level (Gait)  independent  -LM     Distance in Feet (Gait)  500 feet  -LM     [place] Level (Stairs)  independent  -LM     Handrail Location (Stairs)  none  -LM     Number of Steps (Stairs)  5  -LM     Ascending Technique (Stairs)  step-over-step  -LM     Descending Technique (Stairs)  step-over-step  -LM     Comment (Gait/Stairs)  No unsteadiness or gait abnormalities noted.  Pt feels he is at baseline and has no concerns re: mobility or safety.  # of stairs limited due to IV tube length.  -LM       User Key  (r) = Recorded By, (t) = Taken By, (c) = Cosigned By    Initials Name Provider Type    LM Malika Orona PT Physical Therapist        Obj/Interventions     Row Name 02/03/21 1047          Range of Motion Comprehensive    General Range of Motion  bilateral lower extremity ROM WFL  -LM     [facility] Name 02/03/21 1047          Strength Comprehensive (MMT)    General Manual Muscle Testing (MMT) Assessment  no strength deficits identified BLEs  -LM     Row Name 02/03/21 1047          Balance    Balance Assessment  sitting static balance;standing static balance;standing dynamic balance  -LM     Static Sitting Balance  WFL;sitting, edge of bed  -LM     Static Standing Balance  WFL;unsupported  -LM     Dynamic Standing Balance  WFL;unsupported  -LM     Row Name 02/03/21 1047          Sensory Assessment (Somatosensory)    Sensory Assessment (Somatosensory)  LE sensation intact  -LM       User Key  (r) = Recorded By, (t) = Taken By, (c) = Cosigned By    Initials Name Provider Type    LM Malika Orona PT Physical Therapist        Goals/Plan    No documentation.       Clinical Impression     Row Name 02/03/21 1047          Pain    Additional Documentation  Pain Scale: Numbers Pre/Post-Treatment (Group)  -LM     [facility] Name 02/03/21 1047          Pain Scale: Numbers Pre/Post-Treatment    Pretreatment Pain Rating  4/10  -LM     Posttreatment Pain Rating  4/10  -LM     Pain Location - Orientation  incisional  -LM      Pain Location  abdomen  -LM     Pain Intervention(s)  Repositioned;Ambulation/increased activity  -LM     Row Name 02/03/21 1047          Plan of Care Review    Plan of Care Reviewed With  patient;spouse  -     Outcome Summary  PT evaluation completed.  Pt demonstrated independence with bed mobility, transfers, 500 feet of gait without AD, and ambulating up/down 5 stairs without a HR - no unsteadiness or LOB noted.  Pt has no concerns re: mobility or safety once d/c'd.  Pt has been ambulating in the hallways with spouse multiple times a day.  PT will sign off.  Recommend home at d/c.  -     Row Name 02/03/21 1047          Therapy Assessment/Plan (PT)    Criteria for Skilled Interventions Met (PT)  no;no problems identified which require skilled intervention  -LM     Row Name 02/03/21 1047          Vital Signs    Pre Systolic BP Rehab  106  -LM     Pre Treatment Diastolic BP  71  -LM     Pretreatment Heart Rate (beats/min)  75  -LM     Posttreatment Heart Rate (beats/min)  70  -LM     Pre Patient Position  Supine  -LM     Post Patient Position  Supine  -LM     Row Name 02/03/21 1047          Positioning and Restraints    Pre-Treatment Position  in bed  -LM     Post Treatment Position  bed  -LM     In Bed  supine;call light within reach;encouraged to call for assist;with family/caregiver;notified nsg  -       User Key  (r) = Recorded By, (t) = Taken By, (c) = Cosigned By    Initials Name Provider Type    LM Malika Orona, PT Physical Therapist        Outcome Measures     Row Name 02/03/21 1047          How much help from another person do you currently need...    Turning from your back to your side while in flat bed without using bedrails?  4  -LM     Moving from lying on back to sitting on the side of a flat bed without bedrails?  4  -LM     Moving to and from a bed to a chair (including a wheelchair)?  4  -LM     Standing up from a chair using your arms (e.g., wheelchair, bedside chair)?  4  -LM      Climbing 3-5 steps with a railing?  4  -LM     To walk in hospital room?  4  -LM     AM-PAC 6 Clicks Score (PT)  24  -LM     Row Name 02/03/21 1047          Functional Assessment    Outcome Measure Options  AM-PAC 6 Clicks Basic Mobility (PT)  -       User Key  (r) = Recorded By, (t) = Taken By, (c) = Cosigned By    Initials Name Provider Type    Malika Quevedo PT Physical Therapist        Physical Therapy Education                 Title: PT OT SLP Therapies (In Progress)     Topic: Physical Therapy (Done)     Point: Mobility training (Done)     Learning Progress Summary           Patient Acceptance, E, VU,DU by LM at 2/3/2021 1118    Acceptance, E, VU by BR at 1/29/2021 1007                   Point: Precautions (Done)     Learning Progress Summary           Patient Acceptance, E, VU,DU by LM at 2/3/2021 1118    Acceptance, E, VU by BR at 1/29/2021 1007                               User Key     Initials Effective Dates Name Provider Type Discipline     07/24/19 -  Malika Orona PT Physical Therapist PT    BR 06/16/16 -  Shweta Vital RN Registered Nurse Nurse              PT Recommendation and Plan     Plan of Care Reviewed With: patient, spouse  Outcome Summary: PT evaluation completed.  Pt demonstrated independence with bed mobility, transfers, 500 feet of gait without AD, and ambulating up/down 5 stairs without a HR - no unsteadiness or LOB noted.  Pt has no concerns re: mobility or safety once d/c'd.  Pt has been ambulating in the hallways with spouse multiple times a day.  PT will sign off.  Recommend home at d/c.     Time Calculation:   PT Charges     Row Name 02/03/21 1047             Time Calculation    Start Time  1047  -LM      PT Received On  02/03/21  -        User Key  (r) = Recorded By, (t) = Taken By, (c) = Cosigned By    Initials Name Provider Type    Malika Quevedo PT Physical Therapist        Therapy Charges for Today     Code Description Service Date Service Provider Modifiers  Qty    [id number] HC PT EVAL LOW COMPLEXITY 4 2/3/2021 Malika Orona, PT GP 1          PT G-Codes  Outcome Measure Options: AM-PAC 6 Clicks Basic Mobility (PT)  AM-PAC 6 Clicks Score (PT): 24  AM-PAC 6 Clicks Score (OT): 24    PT Discharge Summary  Anticipated Discharge Disposition (PT): home    Malika Orona, PT  2/3/2021

## 2021-02-03 NOTE — PROGRESS NOTES
[facility] Progress Note        Antibiotics:  Anti-Infectives (From admission, onward)    Ordered     Dose/Rate Route Frequency Start Stop    02/01/21 0947  piperacillin-tazobactam (ZOSYN) 3.375 g in iso-osmotic dextrose 50 ml (premix)     Ordering Provider: Adalgisa Flores PharmD    3.375 g  over 4 Hours Intravenous Every 8 Hours Scheduled 02/01/21 1400 02/14/21 1359    01/31/21 1319  piperacillin-tazobactam (ZOSYN) 3.375 g in iso-osmotic dextrose 50 ml (premix)     Ordering Provider: Andrei Cantrell MD    3.375 g  over 30 Minutes Intravenous Once 01/31/21 1415 01/31/21 1550    01/31/21 1319  DAPTOmycin (CUBICIN) 400 mg in sodium chloride 0.9 % 50 mL IVPB     Ordering Provider: Andrei Cantrell MD    6 mg/kg × 68.5 kg  100 mL/hr over 30 Minutes Intravenous Once 01/31/21 1415 01/31/21 1528    01/27/21 0823  micafungin 100 mg/100 mL 0.9% NS IVPB (mbp)     Andrei Cantrell MD reviewed the order on 02/01/21 0714.   Ordering Provider: Andrei Cantrell MD    100 mg  over 60 Minutes Intravenous Every 24 Hours 01/27/21 0900 02/10/21 0859    01/26/21 0827  micafungin 100 mg/100 mL 0.9% NS IVPB (mbp)     Ordering Provider: Andrei Cantrell MD    100 mg  over 60 Minutes Intravenous Once 01/26/21 1000 01/26/21 1052    01/26/21 0827  DAPTOmycin (CUBICIN) 400 mg in sodium chloride 0.9 % 50 mL IVPB     Ordering Provider: Andrei Cantrell MD    6 mg/kg × 68.5 kg  100 mL/hr over 30 Minutes Intravenous Once 01/26/21 0915 01/26/21 1145    01/25/21 2321  piperacillin-tazobactam (ZOSYN) 3.375 g in iso-osmotic dextrose 50 ml (premix)     Ordering Provider: Zane Gonzales MD    3.375 g  over 30 Minutes Intravenous Once 01/26/21 0015 01/26/21 0103    01/25/21 1657  piperacillin-tazobactam (ZOSYN) 3.375 g in iso-osmotic dextrose 50 ml (premix)     Ordering Provider: Cabrera Mayer MD    3.375 g Intravenous Once 01/25/21 1659 01/25/21 1755          CC: abd pain    HPI:    Patient is a 55 y.o.  Yr old male with history  of ulcerative colitis with prior Entyvio, although not for at least a month.  Admitted January 19 until January 23 for surgery by Dr. Montero; he underwent surgery on January 19 with proctocolectomy, total measle rectal excision with ileal pouch, protecting loop ileostomy and pouch/anal anastomosis with resection of accessory spleen. Drains removed prior to discharge.   Postoperative course had been complicated by drop in hematocrit postop day 2 per records with concern for possible hematoma.  In addition notes indicate JEFF stapler dysfunction but no evidence at the time for leak/extravasation; discharged  January 23.  Readmitted on January 25 with progressive abdominal pain, particularly in the left side lower quadrant.  Evidence for leukocytosis and fever to 100.6; CT scan of the abdomen with evidence for free air, high density material intermingled with abdominal ascites per radiology, but no discrete anastomotic leak seen per radiology.   Dilated small bowel loops noted  Per radiology.  Case discussed with Dr. Montero with  operative washout on January 26.     1/31/21 wbc increased, empiric change unasyn to zosyn;  daptmoycin x 1 ;  U/A and blood cultures sent    2/3/21 feeling better in general ;  Advancing diet and weaning TPN per him;     postop Abdominal pain is less intense, intermittent, nonradiating,  Worse with movement and pressure,  2   out of 10 in severity, better with pain meds.  He denies hematochezia melena or hematemesis.    No headache photophobia or neck stiffness.  No shortness of breath  Hemoptysis; mild dry cough.  No dysuria hematuria or pyuria.   No other new skin rash     ROS:      2/3/21 No f/c/s. No n/v/d. No rash. No new ADR to Abx.     Constitutional-- No  chills or sweats.  Appetite diminished with fatigue  Heent-- No new vision, hearing or throat complaints.  No epistaxis or oral sores.  Denies odynophagia or dysphagia.  No flashers, floaters or eye pain. No odynophagia or dysphagia. No  "headache, photophobia or neck stiffness.  CV-- No chest pain, palpitation or syncope  Resp-- No SOB/cough/Hemoptysis  GI- +nausea; No vomiting, or diarrhea.  No hematochezia, melena, or hematemesis. Denies jaundice or chronic liver disease.  -- No dysuria, hematuria, or flank pain.  Denies hesitancy, urgency or flank pain.  Lymph- no swollen lymph nodes in neck/axilla or groin.   Heme- No active bruising; no Hx of DVT or PE.  MS-- no swelling or pain in the bones or joints of arms/legs.  No new back pain.  Neuro-- No acute focal weakness or numbness in the arms or legs.  No seizures.     Full 12 point review of systems reviewed and negative otherwise for acute complaints, except for above       PE:   /76 (BP Location: Left arm, Patient Position: Lying)   Pulse 74   Temp 98.9 °F (37.2 °C) (Oral)   Resp 18   Ht 175.3 cm (69.02\")   Wt 69 kg (152 lb 1.8 oz)   SpO2 96%   BMI 22.45 kg/m²     GENERAL: sleepy  , in no acute distress.    HEENT: Normocephalic, atraumatic.  PERRL. EOMI. No conjunctival injection. No icterus. Oropharynx clear without evidence of thrush or exudate. No evidence of peridontal disease.    NECK: Supple without nuchal rigidity. No mass.  LYMPH: No cervical, axillary or inguinal lymphadenopathy.  HEART: RRR; No murmur, rubs, gallops.   LUNGS: diminished at bases , trace crackles bilaterally without wheezing,   rhonchi. Normal respiratory effort. Nonlabored. No dullness.  ABDOMEN: Soft, less tender, not distended. diminished bowel sounds.  NO mass or HSM.  EXT:  No cyanosis, clubbing or edema. No cord.  : Genitalia generally unremarkable.    MSK: FROM without joint effusions noted arms/legs.    SKIN: Warm and dry without cutaneous eruptions on Inspection/palpation.    NEURO: sleepy       No peripheral stigmata such phenomena of endocarditis     abd  surgical site without obvious purulence; wound grnaulating.  Minimal skin discoloration and no discrete mass/bulge or fluctuance. No new " purulence     ostomy noted     IV without obvious redness or drainage    Laboratory Data    Results from last 7 days   Lab Units 02/03/21 0416 02/02/21 0410 02/01/21  0454   WBC 10*3/mm3 10.73 14.72* 15.41*   HEMOGLOBIN g/dL 10.0* 10.4* 9.9*   HEMATOCRIT % 31.3* 32.8* 31.4*   PLATELETS 10*3/mm3 659* 689* 621*     Results from last 7 days   Lab Units 02/03/21  0416   SODIUM mmol/L 136   POTASSIUM mmol/L 4.0   CHLORIDE mmol/L 102   CO2 mmol/L 25.0   BUN mg/dL 14   CREATININE mg/dL 0.64*   GLUCOSE mg/dL 107*   CALCIUM mg/dL 8.6     Results from last 7 days   Lab Units 02/02/21  0410   ALK PHOS U/L 88   BILIRUBIN mg/dL 0.4   ALT (SGPT) U/L 50*   AST (SGOT) U/L 38         Results from last 7 days   Lab Units 02/01/21 0454   CRP mg/dL 3.95*       Estimated Creatinine Clearance: 124.3 mL/min (A) (by C-G formula based on SCr of 0.64 mg/dL (L)).      Microbiology:      Radiology:  Imaging Results (Last 72 Hours)     ** No results found for the last 72 hours. **            Impression:     --acute postoperative IA abscess/infected hematoma, E Faecalis so far in culture;    Postoperative course with concern for blood loss and  hematoma.   radiology reports no confirmatory evidence for leak. Had operative intervention/washout  1/26   ; less cough and incentive spirometry encouraged;  No urinary symptoms, no other skin/soft tissue process present.  IV site appears okay.  Abdominal wall surgical site and stoma appear okay.  Monitor for other potential process    **culture with enterococcus  So far    --postop leukocytosis as below     --acute abd pain as above     --Ulcerative colitis with prior history immunosuppression/Biologics although not for at least a month preceding admission. Surgery as above on January 19 with colon resection     -- Acute postoperative anemia with concern for acute blood loss and hematoma as above       PLAN:     --IV  Zosyn/mycamine      --Checks/review labs cultures and scans     --Partial history  per nursing staff     --Discussed with microbiology       --Highly complex set of issues with high risk for further serious morbidity and other serious sequela     --Urinalysis noted and bland    --encouraged incentive spirometry    --d/w Dr DEAN and Dr Montero    --wbc trended up postop, but overall feeling better; d/w Dr DEAN, nursing  And Dr Montero;  Checked urinalysis 1/31;  No new resp symptoms, exam nonfocal and encouraged spirometry; abd improving and no new suppurative change at abd wall;  No new urinary symptoms; IF recurrent climb in wbc you may need to check further scans -v- further empiric abx adjustments -v- both/other workup; given improvements with zosyn/mycamine, continue for now      Andrei Cantrell MD  2/3/2021

## 2021-02-03 NOTE — PLAN OF CARE
Problem: Adult Inpatient Plan of Care  Goal: Plan of Care Review  Recent Flowsheet Documentation  Taken 2/3/2021 2945 by Cat Ford OT  Plan of Care Reviewed With: patient  Outcome Summary: OT eval complete.  Pt mod ind supine to sit,  independent to doff socks and don/doff shoes, independent STS,  SBA ( d/t IV pole)  to ambulate 240 ft without AD.  Pt does not demonstrate any deficits which would require OT intervention at this time.  Recommend home upon d/c

## 2021-02-03 NOTE — PROGRESS NOTES
"[facility] ([facility])      Feels well  Wife at bedside  Readily ambulatory and good results with diet  Looking forward to discharge home      Vital Signs:  Blood pressure 105/69, pulse 80, temperature 98.1 °F (36.7 °C), temperature source Oral, resp. rate 18, height 175.3 cm (69.02\"), weight 69 kg (152 lb 1.8 oz), SpO2 96 %.    Labs past 24 hours:  Lab Results (last 24 hours)     Procedure Component Value Units Date/Time    POC Glucose Once [[id number]]  (Normal) Collected: 02/03/21 1650    Specimen: Blood Updated: 02/03/21 1657     Glucose 112 mg/dL     Blood Culture - Blood, Hand, Left [[id number]] Collected: 01/31/21 1352    Specimen: Blood from Hand, Left Updated: 02/03/21 1500     Blood Culture No growth at 3 days    Blood Culture - Blood, Arm, Left [[id number]] Collected: 01/31/21 1352    Specimen: Blood from Arm, Left Updated: 02/03/21 1500     Blood Culture No growth at 3 days    POC Glucose Once [[id number]]  (Normal) Collected: 02/03/21 1148    Specimen: Blood Updated: 02/03/21 1208     Glucose 124 mg/dL     Basic Metabolic Panel [[id number]]  (Abnormal) Collected: 02/03/21 0416    Specimen: Blood Updated: 02/03/21 0612     Glucose 107 mg/dL      BUN 14 mg/dL      Creatinine 0.64 mg/dL      Sodium 136 mmol/L      Potassium 4.0 mmol/L      Chloride 102 mmol/L      CO2 25.0 mmol/L      Calcium 8.6 mg/dL      eGFR Non African Amer 129 mL/min/1.73      BUN/Creatinine Ratio 21.9     Anion Gap 9.0 mmol/L     Narrative:      GFR Normal >60  Chronic Kidney Disease <60  Kidney Failure <15      Magnesium [[id number]]  (Normal) Collected: 02/03/21 0416    Specimen: Blood Updated: 02/03/21 0612     Magnesium 2.2 mg/dL     Phosphorus [[id number]]  (Normal) Collected: 02/03/21 0416    Specimen: Blood Updated: 02/03/21 0612     Phosphorus 3.4 mg/dL     CBC & Differential [[id number]]  (Abnormal) Collected: 02/03/21 0416    Specimen: Blood Updated: 02/03/21 0558    Narrative:      The following " orders were created for panel order CBC & Differential.  Procedure                               Abnormality         Status                     ---------                               -----------         ------                     CBC Auto Differential[[id number]]        Abnormal            Final result                 Please view results for these tests on the individual orders.    CBC Auto Differential [[id number]]  (Abnormal) Collected: 02/03/21 0416    Specimen: Blood Updated: 02/03/21 0558     WBC 10.73 10*3/mm3      RBC 3.17 10*6/mm3      Hemoglobin 10.0 g/dL      Hematocrit 31.3 %      MCV 98.7 fL      MCH 31.5 pg      MCHC 31.9 g/dL      RDW 12.8 %      RDW-SD 45.7 fl      MPV 9.3 fL      Platelets 659 10*3/mm3      Neutrophil % 61.2 %      Lymphocyte % 21.3 %      Monocyte % 9.1 %      Eosinophil % 6.0 %      Basophil % 0.7 %      Immature Grans % 1.7 %      Neutrophils, Absolute 6.57 10*3/mm3      Lymphocytes, Absolute 2.29 10*3/mm3      Monocytes, Absolute 0.98 10*3/mm3      Eosinophils, Absolute 0.64 10*3/mm3      Basophils, Absolute 0.07 10*3/mm3      Immature Grans, Absolute 0.18 10*3/mm3      nRBC 0.0 /100 WBC     POC Glucose Once [[id number]]  (Normal) Collected: 02/03/21 0512    Specimen: Blood Updated: 02/03/21 0513     Glucose 104 mg/dL     POC Glucose Once [[id number]]  (Normal) Collected: 02/03/21 0032    Specimen: Blood Updated: 02/03/21 0034     Glucose 124 mg/dL     AFB Culture - Peritoneal Fluid, Peritoneum [[id number]] Collected: 01/26/21 2157    Specimen: Peritoneal Fluid from Peritoneum Updated: 02/02/21 2215     AFB Culture No AFB isolated at 1 week     AFB Stain No acid fast bacilli seen on concentrated smear    POC Glucose Once [[id number]]  (Normal) Collected: 02/02/21 1949    Specimen: Blood Updated: 02/02/21 1950     Glucose 103 mg/dL           I/O last shift:  I/O this shift:  In: 960 [P.O.:960]  Out: 2100 [Urine:1650; Stool:450]     PHYSICAL EXAM-  Comfortable  cv- rsr  Chest- clear,  =  Abd- soft, mild distention, incision ok, JPs ok, stoma ok    Pathology:  Order Name Source Comment Collection Info Order Time   COVID PRE-OP / PRE-PROCEDURE SCREENING ORDER (NO ISOLATION) Nasopharynx   1/26/2021  2:47 PM     Please select your location:   [facility]          COVID Screening Order:   In-House: EMERGENT-DIAMOND,2 HR TAT [[id number]]          Previously tested for COVID-19?   Unknown          Employed in healthcare setting?   Unknown          Symptomatic for COVID-19 as defined by CDC?   Unknown          Hospitalized for COVID-19?   Unknown          Admitted to ICU for COVID-19?   Unknown          Resident in a congregate (group) care setting?   Unknown        COVID PRE-OP / PRE-PROCEDURE SCREENING ORDER (NO ISOLATION) Nasopharynx   1/26/2021  6:51 PM     Please select your location:   [facility]          COVID Screening Order:   In-House: EMERGENT-CEPHEID,3-4 HR TAT [[id number]]          Previously tested for COVID-19?   Yes          Employed in healthcare setting?   Unknown          Symptomatic for COVID-19 as defined by CDC?   Unknown          Hospitalized for COVID-19?   Unknown          Admitted to ICU for COVID-19?   Unknown          Resident in a congregate (group) care setting?   Unknown        ANAEROBIC CULTURE Peritoneum  Collected By: Silverio Montero MD 1/26/2021 10:01 PM   BODY FLUID CULTURE Peritoneum  Collected By: Silverio Montero MD 1/26/2021 10:01 PM   AFB CULTURE Peritoneum  Collected By: Silverio Montero MD 1/26/2021 10:01 PM   .    Assessment and Plan:  Feels well  Wife at bedside  Readily ambulatory and good results with diet  Looking forward to discharge home    Seen yesterday, but I forgot to write a note  Good progress yesterday  Even better progress today    Plans to remove both left and right JANNET drains  Discharge instructions reviewed with emphasis on maintaining hydration, use of Imodium    Office follow-up in 7 to 12 days.      Silverio Montero MD  02/03/21  18:19 EST

## 2021-02-03 NOTE — THERAPY DISCHARGE NOTE
Acute Care - Occupational Therapy Discharge  [facility]    Patient Name: Craig Smalls  : 1965    MRN: [id number]                              Today's Date: 2/3/2021       Admit Date: 2021    Visit Dx:     ICD-10-CM ICD-9-CM   1. Small bowel obstruction (CMS/HCC)  K56.609 560.9   2. Postoperative abdominal pain  R10.9 789.00    G89.18 338.18   3. Hematoma  T14.8XXA 924.9   4. S/P proctocolectomy( total mesorectal excision, ileal pouch, pouch anal anastomosis, protecting loop ileostomy, resection of accessory spleen)     Z90.49 V45.79     V45.72   5. S/P Exploratory laparotomy, Washout of hematoma, cultures obtained- without gross evidence of significant ileus character or infection, MARCUS, Bilateral lower abdominal JANNET drain placements.  Z98.890 V45.89     Patient Active Problem List   Diagnosis   • Chronic ulcerative colitis (CMS/HCC)   • S/P proctocolectomy( total mesorectal excision, ileal pouch, pouch anal anastomosis, protecting loop ileostomy, resection of accessory spleen)      • Leukocytosis, likely reactive   • Acute blood loss anemia   • Hyperkalemia, likely related to IVF   • Small bowel obstruction (CMS/HCC)   • Benign prostatic hyperplasia without lower urinary tract symptoms   • S/P Exploratory laparotomy, Washout of hematoma, cultures obtained- without gross evidence of significant ileus character or infection, MARCUS, Bilateral lower abdominal JANNET drain placements.     Past Medical History:   Diagnosis Date   • Enlarged prostate    • Ulcerative colitis, chronic (CMS/HCC)    • Wears glasses     Reading    • Wears hearing aid     non-compliant at times   • Wears partial dentures     UPPER PARTIAL     Past Surgical History:   Procedure Laterality Date   • ABDOMINAL PERINEAL RESECTION N/A 2021    Procedure: Proctocolectomy, total mesorectal excision, Ileal pouch, Pouch anal anastomosis, Protecting loop ileostomy, Resection of accessory spleen;  Surgeon: Silverio Montero MD;  Location:   [facility];  Service: General;  Laterality: N/A;   • COLON RESECTION N/A 1/26/2021    Procedure: SURGICAL EXPLORATION AND DRAIN PLACEMENT, LYSIS OF ADHESIONS;  Surgeon: Silverio Montero MD;  Location:  [facility];  Service: General;  Laterality: N/A;   • COLONOSCOPY  2020     General Information     Row Name 02/03/21 0745          OT Time and Intention    Document Type  discharge evaluation/summary  -     Mode of Treatment  occupational therapy  -     Row Name 02/03/21 0708          General Information    Patient Profile Reviewed  yes  -     Prior Level of Function  independent:;ADL's;all household mobility;community mobility;driving  -     Existing Precautions/Restrictions  -- 2 JANNET drains, ileostomy  -     Row Name 02/03/21 0739          Living Environment    Lives With  spouse  -     Row Name 02/03/21 0772          Home Main Entrance    Number of Stairs, Main Entrance  ten  -AC     Stair Railings, Main Entrance  railings on both sides of stairs  -     Row Name 02/03/21 0745          Stairs Within Home, Primary    Stairs, Within Home, Primary  into bathroom  -     Number of Stairs, Within Home, Primary  two  -AC     Stair Railings, Within Home, Primary  railing on left side (ascending)  -     Row Name 02/03/21 0786          Cognition    Orientation Status (Cognition)  oriented x 4  -AC       User Key  (r) = Recorded By, (t) = Taken By, (c) = Cosigned By    Initials Name Provider Type    AC Cat Ford OT Occupational Therapist        Mobility/ADL's     Row Name 02/03/21 0718          Bed Mobility    Bed Mobility  supine-sit  -     Supine-Sit [place] (Bed Mobility)  modified independence  -     Assistive Device (Bed Mobility)  head of bed elevated  -     Row Name 02/03/21 0786          Transfers    Transfers  sit-stand transfer  -     Sit-Stand [place] (Transfers)  independent  -     Row Name 02/03/21 0763          Functional Mobility    Functional Mobility- Ind. Level  standby  assist d/t IV pole  -     Functional Mobility- Device  -- no AD  -     Functional Mobility-Distance (Feet)  240  -     Row Name 02/03/21 0745          Activities of Daily Living    BADL Assessment/Intervention  lower body dressing  -[facility] Name 02/03/21 0745          Lower Body Dressing Assessment/Training    [place] Level (Lower Body Dressing)  doff;don;shoes/slippers;socks;independent  -     Position (Lower Body Dressing)  edge of bed sitting  -       User Key  (r) = Recorded By, (t) = Taken By, (c) = Cosigned By    Initials Name Provider Type     Cat Ford, OT Occupational Therapist        Obj/Interventions     Row Name 02/03/21 0745          Sensory Assessment (Somatosensory)    Sensory Assessment (Somatosensory)  UE sensation intact;bilateral LE  -[facility] Name 02/03/21 0745          Range of Motion Comprehensive    General Range of Motion  bilateral upper extremity ROM WNL  -[facility] Name 02/03/21 0745          Strength Comprehensive (MMT)    Comment, General Manual Muscle Testing (MMT) Assessment  BUE 5/5  -       User Key  (r) = Recorded By, (t) = Taken By, (c) = Cosigned By    Initials Name Provider Type     Cat Ford, OT Occupational Therapist        Goals/Plan    No documentation.       Clinical Impression     [facility] Name 02/03/21 0745          Pain Assessment    Additional Documentation  Pain Scale: Numbers Pre/Post-Treatment (Group)  -AC     Row Name 02/03/21 0745          Pain Scale: Numbers Pre/Post-Treatment    Pretreatment Pain Rating  0/10 - no pain  -     Posttreatment Pain Rating  0/10 - no pain  -AC     Row Name 02/03/21 0745          Plan of Care Review    Plan of Care Reviewed With  patient  -AC     Outcome Summary  OT eval complete.  Pt mod ind supine to sit,  independent to doff socks and don/doff shoes, independent STS,  SBA ( d/t IV pole)  to ambulate 240 ft without AD.  Pt does not demonstrate any deficits which would require OT intervention at this  time.  Recommend home upon d/c  -AC     Row Name 02/03/21 0745          Therapy Assessment/Plan (OT)    Criteria for Skilled Therapeutic Interventions Met (OT)  no;no problems identified which require skilled intervention  -AC     Row Name 02/03/21 0745          Therapy Plan Review/Discharge Plan (OT)    Anticipated Discharge Disposition (OT)  home with assist  -AC     Row Name 02/03/21 0745          Vital Signs    Pre Systolic BP Rehab  103  -AC     Pre Treatment Diastolic BP  61  -AC     Post Systolic BP Rehab  119  -AC     Post Treatment Diastolic BP  78  -AC     Pretreatment Heart Rate (beats/min)  77  -AC     Posttreatment Heart Rate (beats/min)  71  -AC     O2 Delivery Pre Treatment  room air  -AC     O2 Delivery Intra Treatment  room air  -AC     O2 Delivery Post Treatment  room air  -AC     Pre Patient Position  Supine  -AC     Intra Patient Position  Standing  -AC     Post Patient Position  Sitting  -AC     Row Name 02/03/21 0745          Positioning and Restraints    Pre-Treatment Position  in bed  -AC     Post Treatment Position  chair  -AC     In Chair  reclined;call light within reach;encouraged to call for assist  -AC       User Key  (r) = Recorded By, (t) = Taken By, (c) = Cosigned By    Initials Name Provider Type    AC Cat Ford, OT Occupational Therapist        Outcome Measures     Row Name 02/03/21 0745          How much help from another is currently needed...    Putting on and taking off regular lower body clothing?  4  -AC     Bathing (including washing, rinsing, and drying)  4  -AC     Toileting (which includes using toilet bed pan or urinal)  4  -AC     Putting on and taking off regular upper body clothing  4  -AC     Taking care of personal grooming (such as brushing teeth)  4  -AC     Eating meals  4  -AC     AM-PAC 6 Clicks Score (OT)  24  -AC     Row Name 02/03/21 0745          Functional Assessment    Outcome Measure Options  AM-PAC 6 Clicks Daily Activity (OT)  -AC       User Key   (r) = Recorded By, (t) = Taken By, (c) = Cosigned By    Initials Name Provider Type     Cat Ford OT Occupational Therapist        Occupational Therapy Education                 Title: PT OT SLP Therapies (In Progress)     Topic: Occupational Therapy (In Progress)     Point: ADL training (Done)     Description:   Instruct learner(s) on proper safety adaptation and remediation techniques during self care or transfers.   Instruct in proper use of assistive devices.              Learning Progress Summary           Patient Acceptance, E, VU by  at 2/3/2021 8674    Comment: role of OT, d/c plan                   Point: Home exercise program (Not Started)     Description:   Instruct learner(s) on appropriate technique for monitoring, assisting and/or progressing therapeutic exercises/activities.              Learner Progress:  Not documented in this visit.          Point: Precautions (Not Started)     Description:   Instruct learner(s) on prescribed precautions during self-care and functional transfers.              Learner Progress:  Not documented in this visit.          Point: Body mechanics (Not Started)     Description:   Instruct learner(s) on proper positioning and spine alignment during self-care, functional mobility activities and/or exercises.              Learner Progress:  Not documented in this visit.                      User Key     Initials Effective Dates Name Provider Type Discipline     06/23/15 -  Cat Ford OT Occupational Therapist OT              OT Recommendation and Plan  Retired Outcome Summary/Treatment Plan (OT)  Anticipated Discharge Disposition (OT): home with assist  Plan of Care Review  Plan of Care Reviewed With: patient  Outcome Summary: OT eval complete.  Pt mod ind supine to sit,  independent to doff socks and don/doff shoes, independent STS,  SBA ( d/t IV pole)  to ambulate 240 ft without AD.  Pt does not demonstrate any deficits which would require OT intervention at  this time.  Recommend home upon d/c  Plan of Care Reviewed With: patient  Outcome Summary: OT eval complete.  Pt mod ind supine to sit,  independent to doff socks and don/doff shoes, independent STS,  SBA ( d/t IV pole)  to ambulate 240 ft without AD.  Pt does not demonstrate any deficits which would require OT intervention at this time.  Recommend home upon d/c     Time Calculation:   Time Calculation- OT     Row Name 02/03/21 0745             Time Calculation- OT    OT Start Time  0745  -      OT Received On  02/03/21  -        User Key  (r) = Recorded By, (t) = Taken By, (c) = Cosigned By    Initials Name Provider Type    AC Cat Ford, OT Occupational Therapist        Therapy Charges for Today     Code Description Service Date Service Provider Modifiers Qty    [id number]  OT EVAL LOW COMPLEXITY 4 2/3/2021 Cat Ford OT GO 1               Cat Ford OT  2/3/2021

## 2021-02-04 VITALS
HEIGHT: 69 IN | RESPIRATION RATE: 17 BRPM | SYSTOLIC BLOOD PRESSURE: 112 MMHG | TEMPERATURE: 98.8 F | OXYGEN SATURATION: 97 % | HEART RATE: 81 BPM | BODY MASS INDEX: 21.88 KG/M2 | WEIGHT: 147.7 LBS | DIASTOLIC BLOOD PRESSURE: 71 MMHG

## 2021-02-04 LAB
ANION GAP SERPL CALCULATED.3IONS-SCNC: 6 MMOL/L (ref 5–15)
BUN SERPL-MCNC: 13 MG/DL (ref 6–20)
BUN/CREAT SERPL: 17.8 (ref 7–25)
CALCIUM SPEC-SCNC: 9.2 MG/DL (ref 8.6–10.5)
CHLORIDE SERPL-SCNC: 99 MMOL/L (ref 98–107)
CO2 SERPL-SCNC: 28 MMOL/L (ref 22–29)
CREAT SERPL-MCNC: 0.73 MG/DL (ref 0.76–1.27)
DEPRECATED RDW RBC AUTO: 46.1 FL (ref 37–54)
ERYTHROCYTE [DISTWIDTH] IN BLOOD BY AUTOMATED COUNT: 12.9 % (ref 12.3–15.4)
GFR SERPL CREATININE-BSD FRML MDRD: 111 ML/MIN/1.73
GLUCOSE BLDC GLUCOMTR-MCNC: 98 MG/DL (ref 70–130)
GLUCOSE BLDC GLUCOMTR-MCNC: 99 MG/DL (ref 70–130)
GLUCOSE SERPL-MCNC: 87 MG/DL (ref 65–99)
HCT VFR BLD AUTO: 33.5 % (ref 37.5–51)
HGB BLD-MCNC: 10.6 G/DL (ref 13–17.7)
MAGNESIUM SERPL-MCNC: 2.2 MG/DL (ref 1.6–2.6)
MCH RBC QN AUTO: 31.4 PG (ref 26.6–33)
MCHC RBC AUTO-ENTMCNC: 31.6 G/DL (ref 31.5–35.7)
MCV RBC AUTO: 99.1 FL (ref 79–97)
PHOSPHATE SERPL-MCNC: 3 MG/DL (ref 2.5–4.5)
PLATELET # BLD AUTO: 720 10*3/MM3 (ref 140–450)
PMV BLD AUTO: 9.2 FL (ref 6–12)
POTASSIUM SERPL-SCNC: 4.3 MMOL/L (ref 3.5–5.2)
RBC # BLD AUTO: 3.38 10*6/MM3 (ref 4.14–5.8)
SODIUM SERPL-SCNC: 133 MMOL/L (ref 136–145)
WBC # BLD AUTO: 9.61 10*3/MM3 (ref 3.4–10.8)

## 2021-02-04 PROCEDURE — 83735 ASSAY OF MAGNESIUM: CPT

## 2021-02-04 PROCEDURE — 85027 COMPLETE CBC AUTOMATED: CPT | Performed by: COLON & RECTAL SURGERY

## 2021-02-04 PROCEDURE — 25010000002 PIPERACILLIN SOD-TAZOBACTAM PER 1 G

## 2021-02-04 PROCEDURE — 80048 BASIC METABOLIC PNL TOTAL CA: CPT

## 2021-02-04 PROCEDURE — 82962 GLUCOSE BLOOD TEST: CPT

## 2021-02-04 PROCEDURE — 84100 ASSAY OF PHOSPHORUS: CPT

## 2021-02-04 RX ORDER — AMOXICILLIN AND CLAVULANATE POTASSIUM 875; 125 MG/1; MG/1
1 TABLET, FILM COATED ORAL 2 TIMES DAILY
Qty: 10 TABLET | Refills: 0 | Status: SHIPPED | OUTPATIENT
Start: 2021-02-04 | End: 2021-12-14

## 2021-02-04 RX ADMIN — PANTOPRAZOLE SODIUM 40 MG: 40 TABLET, DELAYED RELEASE ORAL at 09:56

## 2021-02-04 RX ADMIN — HYDROCODONE BITARTRATE AND ACETAMINOPHEN 1 TABLET: 7.5; 325 TABLET ORAL at 11:17

## 2021-02-04 RX ADMIN — TAZOBACTAM SODIUM AND PIPERACILLIN SODIUM 3.38 G: 375; 3 INJECTION, SOLUTION INTRAVENOUS at 05:42

## 2021-02-04 RX ADMIN — TAMSULOSIN HYDROCHLORIDE 0.4 MG: 0.4 CAPSULE ORAL at 09:56

## 2021-02-04 RX ADMIN — ESCITALOPRAM OXALATE 10 MG: 10 TABLET ORAL at 09:56

## 2021-02-04 NOTE — PROGRESS NOTES
Case Management Discharge Note      Final Note: Plan is home with spouse and HH [facility]. CM notified [phone]) of pt being discharged today and will be in contact with pt to arrange HH visits. CM will fax discharge summary to [facility] at [phone] when available. Pt discharged home on oral antibiotics. Pt/spouse denies further discharge needs.         Selected Continued Care - Discharged on 2/4/2021 Admission date: 1/25/2021 - Discharge disposition: Home or Self Care    Destination    No services have been selected for the patient.              Durable Medical Equipment    No services have been selected for the patient.              Dialysis/Infusion    No services have been selected for the patient.              Home Medical Care Coordination complete    Service Provider Selected Services Address Phone Fax Patient Preferred    [facility]  [facility] [address] [facility] [address] [phone] --          Therapy    No services have been selected for the patient.              Community Resources    No services have been selected for the patient.                Selected Continued Care - Prior Encounters Includes selections from prior encounters from 10/27/2020 to 2/4/2021    Discharged on 1/23/2021 Admission date: 1/19/2021 - Discharge disposition: Home-Health Care c    Home Medical Care     Service Provider Selected Services Address Phone Fax Patient Preferred    [facility]  [facility] [address] [facility] [address] [phone] --                         Final Discharge Disposition Code: 06 - home with home health care

## 2021-02-04 NOTE — PAYOR COMM NOTE
"Craig Bajwa (55 y.o. Male)     Date of Birth Social Security Number Address Home Phone MRN    1965  [address]   [place] [address] [phone] [id number]    Christian Marital Status          Protestant        Admission Date Admission Type Admitting Provider Attending Provider Department, Room/Bed    1/25/21 Emergency Zane Gonzales MD [place], [place], MD [address], [address]    Discharge Date Discharge Disposition Discharge Destination         Home or Self Care              Attending Provider: Zane Gonzales MD    Allergies: No Known Allergies    Isolation: None   Infection: None   Code Status: CPR    Ht: 175.3 cm (69.02\")   Wt: 67 kg (147 lb 11.2 oz)    Admission Cmt: None   Principal Problem: S/P Exploratory laparotomy, Washout of hematoma, cultures obtained- without gross evidence of significant ileus character or infection, MARCUS, Bilateral lower abdominal JANNET drain placements. [Z98.890]                 Active Insurance as of 1/25/2021     Primary Coverage     Payor Plan Insurance Group Employer/Plan Group    [employer] CR[employer] [facility] [employer] EMPL[employer]EE [id number]     Payor Plan Address Payor Plan Phone Number Payor Plan Fax Number Effective Dates    [address] [phone]  1/1/2019 - None Entered    [facility] [id number]       Subscriber Name Subscriber Birth Date Member ID       CRAIG BAJWA 1965 [id number]           Secondary Coverage     Payor Plan Insurance Group Employer/Plan Group    [facility] PLAN [facility] PLAN [facility] [id number]     Payor Plan Address Payor Plan Phone Number Payor Plan Fax Number Effective Dates    [address]   1/1/2021 - None Entered    [facility] [address]       Subscriber Name Subscriber Birth Date Member ID       CRAIG BAJWA 1965 [id number]                 Emergency Contacts      (Rel.) Home Phone Work Phone Mobile Phone    Jerri Bajwa (Spouse) [phone] -- " [phone]            Discharge Order (From admission, onward)     Start     Ordered    02/04/21 0926  Discharge patient  Once     Comments: F/u  and  per their orders.   Expected Discharge Date: 02/04/21    Expected Discharge Time: Midday    Discharge Disposition: Home or Self Care    Physician of Record for Attribution - Please select from Treatment Team: COLLEEN KILGORE [[id number]]    Review needed by CMO to determine Physician of Record: No       Question Answer Comment   Physician of Record for Attribution - Please select from Treatment Team COLLEEN KILGORE    Review needed by CMO to determine Physician of Record No        02/04/21 0926

## 2021-02-04 NOTE — PROGRESS NOTES
Case Management Discharge Note      Final Note: Plan is home with spouse and HH arranged [facility]. CM notified [phone]) of pt being discharged today and will be in contact with pt to arrange HH visits. CM will fax discharge summary to [facility] at [phone] when available. Pt discharged home on oral antibiotics. Pt/spouse denies further discharge needs.         Selected Continued Care - Discharged on 2/4/2021 Admission date: 1/25/2021 - Discharge disposition: Home or Self Care    Destination    No services have been selected for the patient.              Durable Medical Equipment    No services have been selected for the patient.              Dialysis/Infusion    No services have been selected for the patient.              Home Medical Care Coordination complete    Service Provider Selected Services Address Phone Fax Patient Preferred    [facility]  [facility] [address] [facility] [address] [phone] --          Therapy    No services have been selected for the patient.              Community Resources    No services have been selected for the patient.                Selected Continued Care - Prior Encounters Includes selections from prior encounters from 10/27/2020 to 2/4/2021    Discharged on 1/23/2021 Admission date: 1/19/2021 - Discharge disposition: Home-Health Care c    Home Medical Care     Service Provider Selected Services Address Phone Fax Patient Preferred    [facility]  [facility] [address] [facility] [address] [phone] --                         Final Discharge Disposition Code: 06 - home with home health care

## 2021-02-04 NOTE — PROGRESS NOTES
[facility] Progress Note        Antibiotics:  Anti-Infectives (From admission, onward)    Ordered     Dose/Rate Route Frequency Start Stop    02/01/21 0947  piperacillin-tazobactam (ZOSYN) 3.375 g in iso-osmotic dextrose 50 ml (premix)     Adalgisa Flores, PharmD reviewed the order on 02/03/21 0949.   Ordering Provider: Adalgisa Folres, PharmD    3.375 g  over 4 Hours Intravenous Every 8 Hours Scheduled 02/01/21 1400 02/14/21 1359    01/31/21 1319  piperacillin-tazobactam (ZOSYN) 3.375 g in iso-osmotic dextrose 50 ml (premix)     Ordering Provider: Andrei Cantrell MD    3.375 g  over 30 Minutes Intravenous Once 01/31/21 1415 01/31/21 1550    01/31/21 1319  DAPTOmycin (CUBICIN) 400 mg in sodium chloride 0.9 % 50 mL IVPB     Ordering Provider: Andrei Cantrell MD    6 mg/kg × 68.5 kg  100 mL/hr over 30 Minutes Intravenous Once 01/31/21 1415 01/31/21 1528    01/27/21 0823  micafungin 100 mg/100 mL 0.9% NS IVPB (mbp)     Andrei Cantrell MD reviewed the order on 02/01/21 0714.   Ordering Provider: Andrei Cantrell MD    100 mg  over 60 Minutes Intravenous Every 24 Hours 01/27/21 0900 02/10/21 0859    01/26/21 0827  micafungin 100 mg/100 mL 0.9% NS IVPB (mbp)     Ordering Provider: Andrei Cantrell MD    100 mg  over 60 Minutes Intravenous Once 01/26/21 1000 01/26/21 1052    01/26/21 0827  DAPTOmycin (CUBICIN) 400 mg in sodium chloride 0.9 % 50 mL IVPB     Ordering Provider: Andrei Cantrell MD    6 mg/kg × 68.5 kg  100 mL/hr over 30 Minutes Intravenous Once 01/26/21 0915 01/26/21 1145    01/25/21 2321  piperacillin-tazobactam (ZOSYN) 3.375 g in iso-osmotic dextrose 50 ml (premix)     Ordering Provider: Zane Gonzales MD    3.375 g  over 30 Minutes Intravenous Once 01/26/21 0015 01/26/21 0103    01/25/21 1657  piperacillin-tazobactam (ZOSYN) 3.375 g in iso-osmotic dextrose 50 ml (premix)     Ordering Provider: Cabrera Mayer MD    3.375 g Intravenous Once 01/25/21 1659 01/25/21 1755          CC:  abd pain    HPI:    Patient is a 55 y.o.  Yr old male with history of ulcerative colitis with prior Entyvio, although not for at least a month.  Admitted January 19 until January 23 for surgery by Dr. Montero; he underwent surgery on January 19 with proctocolectomy, total measle rectal excision with ileal pouch, protecting loop ileostomy and pouch/anal anastomosis with resection of accessory spleen. Drains removed prior to discharge.   Postoperative course had been complicated by drop in hematocrit postop day 2 per records with concern for possible hematoma.  In addition notes indicate JEFF stapler dysfunction but no evidence at the time for leak/extravasation; discharged  January 23.  Readmitted on January 25 with progressive abdominal pain, particularly in the left side lower quadrant.  Evidence for leukocytosis and fever to 100.6; CT scan of the abdomen with evidence for free air, high density material intermingled with abdominal ascites per radiology, but no discrete anastomotic leak seen per radiology.   Dilated small bowel loops noted  Per radiology.  Case discussed with Dr. Montero with  operative washout on January 26.     1/31/21 wbc increased, empiric change unasyn to zosyn;  daptmoycin x 1 ;  U/A and blood cultures sent    2/4/21 steadily feeling better in general ;  Advancing diet and weaning TPN per him;     Minimal Abdominal pain , intermittent, nonradiating,  Worse with movement and pressure,  1   out of 10 in severity, better with pain meds.  He denies hematochezia melena or hematemesis.    No headache photophobia or neck stiffness.  No shortness of breath  Hemoptysis; no cough.  No dysuria hematuria or pyuria.   No other new skin rash     ROS:      2/4/21 No f/c/s. No n/v/d. No rash. No new ADR to Abx.     Constitutional-- No  chills or sweats.  Appetite diminished with fatigue  Heent-- No new vision, hearing or throat complaints.  No epistaxis or oral sores.  Denies odynophagia or dysphagia.  No  "flashers, floaters or eye pain. No odynophagia or dysphagia. No headache, photophobia or neck stiffness.  CV-- No chest pain, palpitation or syncope  Resp-- No SOB/cough/Hemoptysis  GI- +nausea; No vomiting, or diarrhea.  No hematochezia, melena, or hematemesis. Denies jaundice or chronic liver disease.  -- No dysuria, hematuria, or flank pain.  Denies hesitancy, urgency or flank pain.  Lymph- no swollen lymph nodes in neck/axilla or groin.   Heme- No active bruising; no Hx of DVT or PE.  MS-- no swelling or pain in the bones or joints of arms/legs.  No new back pain.  Neuro-- No acute focal weakness or numbness in the arms or legs.  No seizures.     Full 12 point review of systems reviewed and negative otherwise for acute complaints, except for above       PE:   /73 (BP Location: Left arm, Patient Position: Lying)   Pulse 77   Temp 98.2 °F (36.8 °C) (Infrared)   Resp 18   Ht 175.3 cm (69.02\")   Wt 67 kg (147 lb 11.2 oz)   SpO2 97%   BMI 21.80 kg/m²     GENERAL: sleepy  , in no acute distress.    HEENT: Normocephalic, atraumatic.  PERRL. EOMI. No conjunctival injection. No icterus. Oropharynx clear without evidence of thrush or exudate. No evidence of peridontal disease.    NECK: Supple without nuchal rigidity. No mass.  LYMPH: No cervical, axillary or inguinal lymphadenopathy.  HEART: RRR; No murmur, rubs, gallops.   LUNGS: diminished at bases , trace crackles bilaterally without wheezing,   rhonchi. Normal respiratory effort. Nonlabored. No dullness.  ABDOMEN: Soft, non tender, not distended. diminished bowel sounds.  NO mass or HSM.  EXT:  No cyanosis, clubbing or edema. No cord.  : Genitalia generally unremarkable.    MSK: FROM without joint effusions noted arms/legs.    SKIN: Warm and dry without cutaneous eruptions on Inspection/palpation.    NEURO: sleepy       No peripheral stigmata such phenomena of endocarditis     abd  surgical site without obvious purulence; wound granulating.  Minimal " skin discoloration and no discrete mass/bulge or fluctuance. No new purulence     ostomy noted     IV without obvious redness or drainage    Laboratory Data    Results from last 7 days   Lab Units 02/04/21  0351 02/03/21  0416 02/02/21  0410   WBC 10*3/mm3 9.61 10.73 14.72*   HEMOGLOBIN g/dL 10.6* 10.0* 10.4*   HEMATOCRIT % 33.5* 31.3* 32.8*   PLATELETS 10*3/mm3 720* 659* 689*     Results from last 7 days   Lab Units 02/04/21  0351   SODIUM mmol/L 133*   POTASSIUM mmol/L 4.3   CHLORIDE mmol/L 99   CO2 mmol/L 28.0   BUN mg/dL 13   CREATININE mg/dL 0.73*   GLUCOSE mg/dL 87   CALCIUM mg/dL 9.2     Results from last 7 days   Lab Units 02/02/21  0410   ALK PHOS U/L 88   BILIRUBIN mg/dL 0.4   ALT (SGPT) U/L 50*   AST (SGOT) U/L 38         Results from last 7 days   Lab Units 02/01/21  0454   CRP mg/dL 3.95*       Estimated Creatinine Clearance: 105.8 mL/min (A) (by C-G formula based on SCr of 0.73 mg/dL (L)).      Microbiology:      Radiology:  Imaging Results (Last 72 Hours)     ** No results found for the last 72 hours. **            Impression:     --acute postoperative IA abscess/infected hematoma, E Faecalis   in culture;    Postoperative course with concern for blood loss and  hematoma.   radiology reports no confirmatory evidence for leak. Had operative intervention/washout  1/26   ; less cough and incentive spirometry encouraged;  No urinary symptoms, no other skin/soft tissue process present.  IV site appears okay.  Abdominal wall surgical site and stoma appear okay.  Monitor for other potential process    **culture with enterococcus  So far; no MDR organisms    --postop leukocytosis as below, resolved     --acute abd pain as above     --Ulcerative colitis with prior history immunosuppression/Biologics although not for at least a month preceding admission. Surgery as above on January 19 with colon resection     -- Acute postoperative anemia with concern for acute blood loss and hematoma as above       PLAN:      --IV  Zosyn/mycamine  While here     --Checks/review labs cultures and scans     --Partial history per nursing staff     --Discussed with microbiology       --Highly complex set of issues with high risk for further serious morbidity and other serious sequela     --Urinalysis noted and bland    --encouraged incentive spirometry    --d/w Dr DEAN and Dr Montero    --wbc trended up postop, but overall feeling better steadily; d/w Dr DEAN, nursing  And Dr Montero;  Checked urinalysis 1/31;  No new resp symptoms, exam nonfocal and encouraged spirometry; abd improving and no new suppurative change at abd wall;  No new urinary symptoms; IF recurrent climb in wbc you may need to check further scans -v- further empiric abx adjustments -v- both/other workup; given improvements with zosyn/mycamine, continue for now while in hospital and anticipate taper to oral agent at discharge    **He is doing well with good oral intake, steady bowel output, afebrile with normalization of WBC and only E Faecalis in culture;  No other MDR organisms; I anticipate taper to oral amox-clavulenate x 4-5 more days;  He knows risk for relapse and other infectious sequelae and the improtance to seek medical attention with new symptoms (I.e. fever, abd pain, etc..);  telehealth f/u with me 2/5      Andrei Cantrell MD  2/4/2021

## 2021-02-04 NOTE — PROGRESS NOTES
Blood from JANNET site concerning given history... but ... no problems overnight and am labs look good without drop in Hct or change in renal function.    Anticipate home today unless problems.

## 2021-02-04 NOTE — PLAN OF CARE
Goal Outcome Evaluation:  Plan of Care Reviewed With: patient     Outcome Summary: VSS. Pain controlled with PRN Norco. No complaints of nausea. Eating very well. Ambulating in hallways and room x5. TPN weaned and turned off at 1800. Both right and left JANNET drains removed, right side bled moderate amount, pressure held and Dr. Montero notified. Room air. Wife at bedside most of day. No new concerns per pt.

## 2021-02-04 NOTE — DISCHARGE SUMMARY
Patient Name: Craig Smlals  MRN: [id number]  : 1965  DOS: 2021    Attending: Zane Gonzales MD    Primary Care Provider: Albert Delgado MD    Date of Admission:.2021  2:57 PM    Date of Discharge:  2021    Discharge Diagnosis:  Intra-abdominal abscess/infected hematoma, E faecalis positive culture     S/P Exploratory laparotomy, Washout of hematoma, cultures obtained- without gross evidence of significant ileus character or infection, MARCUS, Bilateral lower abdominal JANNET drain placements.    S/P proctocolectomy( total mesorectal excision, ileal pouch, pouch anal anastomosis, protecting loop ileostomy, resection of accessory spleen)       Small bowel obstruction (CMS/HCC)    Benign prostatic hyperplasia without lower urinary tract symptoms  Status post PICC line placement, removed prior to discharge.  Leukocytosis, improved.  Resolved      Hospital Course  At admit  Patient is a pleasant 55 y.o. male who is known to me from recent hospitalization.    He was at [facility] between 2021 and 2021.  He underwent complex procedure including  proctocolectomy( total mesorectal excision, ileal pouch, pouch anal anastomosis, protecting loop ileostomy, resection of accessory spleen)  .  He had a slow postoperative course during which he had a drop in his H&H requiring transfusion.  He had plenty of education for stoma management.  Prior to discharge he voided spontaneously, tolerated p.o. diet, and ambulated.  His pain was under fair control.  He returned to the emergency department with complaints of weakness and increasing his abdominal pain.  Was noted to have 2 l of output through his stoma on  and another liter at least prior to presentation on Monday.  Work-up in the ED is noted including findings on CT scan.  Mild leukocytosis.  He is admitted for further management.  Was started on IV antibiotics, IV fluids.  Dr. Montero evaluated and infectious consultation  obtained.  Seen this a.m., mostly complains of pain.  Improved with pain medication.  Also complains of hiccups and burping.  No fever or chills.  Continues to have good stoma output      After admit  Patient was seen and followed by Dr. Silverio Pickett throughout his stay.  The day following his admit he underwent the following procedures (Exploratory laparotomy  Washout of hematoma, cultures obtained- without gross evidence of significant ileus character or infection.  Lysis of adhesions  Bilateral lower abdominal JANNET drain placements.).    He tolerated surgery well.  Nasogastric tube remained in place for a few days following surgery.  Patient received nutrition in the form of TPN via PICC line.    He was started on broad-spectrum IV antibiotics and was followed by Dr. Andrei Cantrell throughout his stay.  Antibiotic adjustments took place during his stay following culture results as well as his labs.      Patient was provided pain medication as needed for pain control.  He received DVT prophylaxis with subcutaneous heparin as well as mechanicals      Patient was started on clear liquid diet, this was advanced when he showed evidence of bowel function return.  Stoma output was good prior to discharge.  Patient continued to receive stoma care/education along with his wife.    Tolerated diet without difficulty.    Patient used an IS for atelectasis prophylaxis.    Both JANNET drains were removed.    Home medications were resumed as appropriate, and labs were monitored and remained fairly stable.      With the progress he has made, pt is ready for DC home today.      With the progress that he has made his antibiotics were transitioned to p.o. antibiotics for a few more days.  This has been discussed with the patient, his wife, as well as Dr. Pcikett.  Dr. Cantrell made the changes.    Discussed with patient regarding plan and he shows understanding and agreement.       Procedures Performed  Procedure(s):  SURGICAL EXPLORATION  "AND DRAIN PLACEMENT, LYSIS OF ADHESIONS       Pertinent Test Results:    I reviewed the patient's new clinical results.   Results from last 7 days   Lab Units 21  035216 21  041   WBC 10*3/mm3 9.61 10.73 14.72*   HEMOGLOBIN g/dL 10.6* 10.0* 10.4*   HEMATOCRIT % 33.5* 31.3* 32.8*   PLATELETS 10*3/mm3 720* 659* 689*     Results from last 7 days   Lab Units 21  03521  0416 210 21  0454 21  0631   SODIUM mmol/L 133* 136 137 136 136   POTASSIUM mmol/L 4.3 4.0 4.7 4.1 4.4   CHLORIDE mmol/L 99 102 101 102 103   CO2 mmol/L 28.0 25.0 29.0 26.0 26.0   BUN mg/dL 13 14 14 15 14   CREATININE mg/dL 0.73* 0.64* 0.59* 0.63* 0.57*   CALCIUM mg/dL 9.2 8.6 8.9 8.4* 8.7   BILIRUBIN mg/dL  --   --  0.4 0.6 0.8   ALK PHOS U/L  --   --  88 82 91   ALT (SGPT) U/L  --   --  50* 40 38   AST (SGOT) U/L  --   --  38 35 34   GLUCOSE mg/dL 87 107* 128* 119* 103*     I reviewed the patient's new imaging including images and reports.  2021 2157 2021 0645 Body Fluid Culture - Peritoneal Fluid, Peritoneum [[id number]]    (Abnormal)   Peritoneal Fluid from Peritoneum    Final result Component Value   Body Fluid Culture Scant growth (1+) Enterococcus faecalisAbnormal    Gram Stain Few (2+) WBCs seen    No organisms seen       Susceptibility     Enterococcus faecalis     SAMI     Ampicillin <=2  Susceptible     Gentamicin High Level Synergy SYN-S  Susceptible     Vancomycin 2  Susceptible            Linear View                   Discharge Assessment:       Visit Vitals  /73 (BP Location: Left arm, Patient Position: Lying)   Pulse 105   Temp 98.2 °F (36.8 °C) (Infrared)   Resp 18   Ht 175.3 cm (69.02\")   Wt 67 kg (147 lb 11.2 oz)   SpO2 97%   BMI 21.80 kg/m²     Temp (24hrs), Av.2 °F (36.8 °C), Min:97.8 °F (36.6 °C), Max:98.6 °F (37 °C)      General Appearance:    Alert, cooperative, in no acute distress   Lungs:     Clear to auscultation,respirations regular, even and    "                unlabored    Heart:    Regular rhythm and normal rate, normal S1 and S2    Abdomen:   Soft and benign, NT, ND. Dressing over midline incision and old oralia sites.   Extremities:   Moves all extremities well, no edema, no cyanosis, no              redness   Pulses:   Pulses palpable and equal bilaterally   Skin:   No bleeding, bruising or rash                Discharge Medications      New Medications      Instructions Start Date   amoxicillin-clavulanate 875-125 MG per tablet  Commonly known as: Augmentin   1 tablet, Oral, 2 Times Daily         Continue These Medications      Instructions Start Date   Budesonide 3 MG 24 hr capsule  Commonly known as: ENTOCORT EC   9 mg, Oral, Every Morning      DRY EYES OP   Ophthalmic, Daily PRN      escitalopram 10 MG tablet  Commonly known as: LEXAPRO   10 mg, Oral, Daily      HYDROcodone-acetaminophen 7.5-325 MG per tablet  Commonly known as: NORCO   1 tablet, Oral, Every 6 Hours PRN      Melatonin 10 MG capsule   10 mg, Oral, Nightly      multivitamin with minerals tablet tablet   1 tablet, Oral, Daily      PREDNISOL OP   1 drop, Ophthalmic, 4 Times Daily      simethicone 125 MG chewable tablet  Commonly known as: MYLICON   125 mg, Oral, Every 6 Hours PRN      tamsulosin 0.4 MG capsule 24 hr capsule  Commonly known as: FLOMAX   1 capsule, Oral, 2 times daily             Discharge Diet: Regular    Activity at Discharge: Ambulate.  Restrictions per Dr. Montero    Follow-up Appointments  Silverio Montero MD per his orders  Andrei Cantrell MD per his orders    Patient will have home health arranged at time of discharge    Discharge took over 30 min      Dragon disclaimer:  Part of this encounter note is an electronic transcription/translation of spoken language to printed text. The electronic translation of spoken language may permit erroneous, or at times, nonsensical words or phrases to be inadvertently transcribed; Although I have reviewed the note for such errors, some may  still exist.       Zane Gonzales MD  02/04/21  09:27 EST

## 2021-02-05 ENCOUNTER — READMISSION MANAGEMENT (OUTPATIENT)
Dept: CALL CENTER | Facility: HOSPITAL | Age: 56
End: 2021-02-05

## 2021-02-05 LAB
BACTERIA SPEC AEROBE CULT: NORMAL
BACTERIA SPEC AEROBE CULT: NORMAL

## 2021-02-09 ENCOUNTER — READMISSION MANAGEMENT (OUTPATIENT)
Dept: CALL CENTER | Facility: HOSPITAL | Age: 56
End: 2021-02-09

## 2021-02-09 NOTE — OUTREACH NOTE
General Surgery Week 1 Survey      Responses   [facility] patient discharged from?  [place]   Does the patient have one of the following disease processes/diagnoses(primary or secondary)?  General Surgery   Week 1 attempt successful?  Yes   Call start time  1334   Call end time  1341   Discharge diagnosis  surgical exploration and drain placement, lysis of adhesions   Meds reviewed with patient/caregiver?  Yes   Is the patient having any side effects they believe may be caused by any medication additions or changes?  No   Does the patient have all medications related to this admission filled (includes all antibiotics, pain medications, etc.)  Yes   Prescription comments  Pt will finish ABX on 2/9/21   Is the patient taking all medications as directed (includes completed medication regime)?  Yes   Does the patient have a follow up appointment scheduled with their surgeon?  Yes [2/15/21]   Has the patient kept scheduled appointments due by today?  N/A   Comments  Appt with PCP is on 2/15/21 however that will need to be rescheduled   What [facility] the Home health agency?   [facility]   Has home health visited the patient within 72 hours of discharge?  Unsure   Psychosocial issues?  No   Did the patient receive a copy of their discharge instructions?  Yes   Nursing interventions  Reviewed instructions with patient   What is the patient's perception of their health status since discharge?  Improving   Nursing interventions  Nurse provided patient education   Is the patient /caregiver able to teach back basic post-op care?  Lifting as instructed by MD in discharge instructions, Keep incision areas clean,dry and protected   Is the patient/caregiver able to teach back signs and symptoms of incisional infection?  Increased drainage or bleeding, Fever, Pus or odor from incision   Is the patient/caregiver able to teach back steps to recovery at home?  Rest and rebuild strength, gradually increase activity, Set small,  achievable goals for return to baseline health   If the patient is a current smoker, are they able to teach back resources for cessation?  Not a smoker   Is the patient/caregiver able to teach back the hierarchy of who to call/visit for symptoms/problems? PCP, Specialist, Home health nurse, Urgent Care, ED, 911  Yes   Week 1 call completed?  Yes          Cailin Li RN

## 2021-02-17 ENCOUNTER — READMISSION MANAGEMENT (OUTPATIENT)
Dept: CALL CENTER | Facility: HOSPITAL | Age: 56
End: 2021-02-17

## 2021-02-17 NOTE — OUTREACH NOTE
General Surgery Week 2 Survey      Responses   [facility] patient discharged from?  [place]   Does the patient have one of the following disease processes/diagnoses(primary or secondary)?  General Surgery   Week 2 attempt successful?  Yes   Call start time  1659   Call end time  1702   Discharge diagnosis  surgical exploration and drain placement, lysis of adhesions   Meds reviewed with patient/caregiver?  Yes   Is the patient taking all medications as directed (includes completed medication regime)?  Yes   Comments  02/15/2021 Dr Delgado  removed the kinjal   Dr Montero next week   What [facility] the [facility] agency[facility]    What is the patient's perception of their health status since discharge?  Improving   Additional teach back comments  Feeling more comfortable with pouches   Week 2 call completed?  Yes          Miriam Russell RN

## 2021-02-25 ENCOUNTER — READMISSION MANAGEMENT (OUTPATIENT)
Dept: CALL CENTER | Facility: HOSPITAL | Age: 56
End: 2021-02-25

## 2021-02-25 NOTE — OUTREACH NOTE
General Surgery Week 3 Survey      Responses   [facility] patient discharged from?  [place]   Does the patient have one of the following disease processes/diagnoses(primary or secondary)?  General Surgery   Week 3 attempt successful?  No   Unsuccessful attempts  Attempt 1          Dhaval Guzman RN

## 2021-02-27 ENCOUNTER — READMISSION MANAGEMENT (OUTPATIENT)
Dept: CALL CENTER | Facility: HOSPITAL | Age: 56
End: 2021-02-27

## 2021-02-27 NOTE — OUTREACH NOTE
General Surgery Week 3 Survey      Responses   [facility] [place] patient discharged from?  [place]   Does the patient have one of the following disease processes/diagnoses(primary or secondary)?  General Surgery   Week 3 attempt successful?  Yes   Call start time  1759   Call end time  1724   Discharge diagnosis  surgical exploration and drain placement, lysis of adhesions   Is patient permission given to speak with other caregiver?  Yes   List who call center can speak with  Wife- Jerri   Meds reviewed with patient/caregiver?  Yes   Is the patient having any side effects they believe may be caused by any medication additions or changes?  No   Does the patient have all medications related to this admission filled (includes all antibiotics, pain medications, etc.)  Yes   Prescription comments  States he finished his ABX Wed or Thurs   Is the patient taking all medications as directed (includes completed medication regime)?  Yes   Does the patient have a follow up appointment scheduled with their surgeon?  Yes   Has the patient kept scheduled appointments due by today?  N/A   Comments  Saw Dr. Montero on Thurs.    Home health comments  Finished on Wed.    Psychosocial issues?  No   Did the patient receive a copy of their discharge instructions?  Yes   Nursing interventions  Reviewed instructions with patient   What is the patient's perception of their health status since discharge?  Improving   Nursing interventions  Nurse provided patient education   Is the patient /caregiver able to teach back basic post-op care?  Lifting as instructed by MD in discharge instructions [States he is walking 4 miles a day]   Is the patient/caregiver able to teach back signs and symptoms of incisional infection?  Increased drainage or bleeding, Fever   Is the patient/caregiver able to teach back steps to recovery at home?  Rest and rebuild strength, gradually increase activity, Set small, achievable goals for return to baseline health,  Eat a well-balance diet   If the patient is a current smoker, are they able to teach back resources for cessation?  Not a smoker   Is the patient/caregiver able to teach back the hierarchy of who to call/visit for symptoms/problems? PCP, Specialist, Home health nurse, Urgent Care, ED, 911  Yes   Additional teach back comments  States his wife is expert with the pouches, eating 90 grams of protein daily   Week 3 call completed?  Yes   Wrap up additional comments  Everything is doing real good.           Juju Daugherty, RN

## 2021-03-09 ENCOUNTER — READMISSION MANAGEMENT (OUTPATIENT)
Dept: CALL CENTER | Facility: HOSPITAL | Age: 56
End: 2021-03-09

## 2021-03-09 LAB
MYCOBACTERIUM SPEC CULT: NORMAL
NIGHT BLUE STAIN TISS: NORMAL

## 2021-03-09 NOTE — OUTREACH NOTE
General Surgery Week 4 Survey      Responses   [facility] patient discharged from?  [facility]   Does the patient have one of the following disease processes/diagnoses(primary or secondary)?  General Surgery   Week 4 attempt successful?  Yes   Call start time  1716   Call end time  1719   Discharge diagnosis  surgical exploration and drain placement, lysis of adhesions   Is the patient taking all medications as directed (includes completed medication regime)?  Yes   Has the patient kept scheduled appointments due by today?  Yes   Is the patient still receiving Home Health Services?  N/A   Psychosocial issues?  No   What is the patient's perception of their health status since discharge?  Improving   Nursing interventions  Nurse provided patient education   Is the patient/caregiver able to teach back steps to recovery at home?  Rest and rebuild strength, gradually increase activity, Set small, achievable goals for return to baseline health, Eat a well-balance diet   If the patient is a current smoker, are they able to teach back resources for cessation?  Not a smoker   Is the patient/caregiver able to teach back the hierarchy of who to call/visit for symptoms/problems? PCP, Specialist, Home health nurse, Urgent Care, ED, 911  Yes   Week 4 call completed?  Yes   Would the patient like one additional call?  No   Graduated  Yes          Ancelmo Bowling RN

## 2021-04-29 ENCOUNTER — TELEPHONE (OUTPATIENT)
Dept: WOUND CARE | Facility: HOSPITAL | Age: 56
End: 2021-04-29

## 2021-05-07 ENCOUNTER — TRANSCRIBE ORDERS (OUTPATIENT)
Dept: ADMINISTRATIVE | Facility: HOSPITAL | Age: 56
End: 2021-05-07

## 2021-05-07 DIAGNOSIS — K51.90 CHRONIC ULCERATIVE COLITIS, WITHOUT COMPLICATIONS (HCC): Primary | ICD-10-CM

## 2021-05-26 ENCOUNTER — APPOINTMENT (OUTPATIENT)
Dept: GENERAL RADIOLOGY | Facility: HOSPITAL | Age: 56
End: 2021-05-26

## 2021-06-14 ENCOUNTER — HOSPITAL ENCOUNTER (OUTPATIENT)
Dept: GENERAL RADIOLOGY | Facility: HOSPITAL | Age: 56
Discharge: HOME | End: 2021-06-14
Admitting: COLON & RECTAL SURGERY

## 2021-06-14 DIAGNOSIS — K51.90 CHRONIC ULCERATIVE COLITIS, WITHOUT COMPLICATIONS (HCC): ICD-10-CM

## 2021-06-14 PROCEDURE — 0 DIATRIZOATE MEGLUMINE & SODIUM PER 1 ML: Performed by: COLON & RECTAL SURGERY

## 2021-06-14 PROCEDURE — 74270 X-RAY XM COLON 1CNTRST STD: CPT

## 2021-06-14 RX ADMIN — DIATRIZOATE MEGLUMINE AND DIATRIZOATE SODIUM 480 ML: 660; 100 LIQUID ORAL; RECTAL at 11:23

## 2021-09-28 ENCOUNTER — APPOINTMENT (OUTPATIENT)
Dept: PREADMISSION TESTING | Facility: HOSPITAL | Age: 56
End: 2021-09-28

## 2021-12-14 ENCOUNTER — PRE-ADMISSION TESTING (OUTPATIENT)
Dept: PREADMISSION TESTING | Facility: HOSPITAL | Age: 56
DRG: 331 | End: 2021-12-14
Payer: COMMERCIAL

## 2021-12-14 VITALS — WEIGHT: 160 LBS | HEIGHT: 69 IN | BODY MASS INDEX: 23.7 KG/M2

## 2021-12-14 LAB
ABO GROUP BLD: NORMAL
ALBUMIN SERPL-MCNC: 4.6 G/DL (ref 3.5–5.2)
ALBUMIN/GLOB SERPL: 2.2 G/DL
ALP SERPL-CCNC: 95 U/L (ref 39–117)
ALT SERPL W P-5'-P-CCNC: 11 U/L (ref 1–41)
ANION GAP SERPL CALCULATED.3IONS-SCNC: 10 MMOL/L (ref 5–15)
AST SERPL-CCNC: 18 U/L (ref 1–40)
BILIRUB SERPL-MCNC: 0.7 MG/DL (ref 0–1.2)
BLD GP AB SCN SERPL QL: NEGATIVE
BUN SERPL-MCNC: 11 MG/DL (ref 6–20)
BUN/CREAT SERPL: 14.1 (ref 7–25)
CALCIUM SPEC-SCNC: 9.8 MG/DL (ref 8.6–10.5)
CHLORIDE SERPL-SCNC: 105 MMOL/L (ref 98–107)
CO2 SERPL-SCNC: 26 MMOL/L (ref 22–29)
CREAT SERPL-MCNC: 0.78 MG/DL (ref 0.76–1.27)
DEPRECATED RDW RBC AUTO: 41.4 FL (ref 37–54)
ERYTHROCYTE [DISTWIDTH] IN BLOOD BY AUTOMATED COUNT: 12.4 % (ref 12.3–15.4)
GFR SERPL CREATININE-BSD FRML MDRD: 102 ML/MIN/1.73
GLOBULIN UR ELPH-MCNC: 2.1 GM/DL
GLUCOSE SERPL-MCNC: 80 MG/DL (ref 65–99)
HBA1C MFR BLD: 5.3 % (ref 4.8–5.6)
HCT VFR BLD AUTO: 39.9 % (ref 37.5–51)
HGB BLD-MCNC: 13.7 G/DL (ref 13–17.7)
MCH RBC QN AUTO: 31.4 PG (ref 26.6–33)
MCHC RBC AUTO-ENTMCNC: 34.3 G/DL (ref 31.5–35.7)
MCV RBC AUTO: 91.5 FL (ref 79–97)
PLATELET # BLD AUTO: 245 10*3/MM3 (ref 140–450)
PMV BLD AUTO: 9.7 FL (ref 6–12)
POTASSIUM SERPL-SCNC: 4.5 MMOL/L (ref 3.5–5.2)
PROT SERPL-MCNC: 6.7 G/DL (ref 6–8.5)
QT INTERVAL: 470 MS
QTC INTERVAL: 477 MS
RBC # BLD AUTO: 4.36 10*6/MM3 (ref 4.14–5.8)
RH BLD: POSITIVE
SARS-COV-2 RNA PNL SPEC NAA+PROBE: NOT DETECTED
SODIUM SERPL-SCNC: 141 MMOL/L (ref 136–145)
T&S EXPIRATION DATE: NORMAL
WBC NRBC COR # BLD: 5.61 10*3/MM3 (ref 3.4–10.8)

## 2021-12-14 PROCEDURE — 93010 ELECTROCARDIOGRAM REPORT: CPT | Performed by: INTERNAL MEDICINE

## 2021-12-14 PROCEDURE — 85027 COMPLETE CBC AUTOMATED: CPT

## 2021-12-14 PROCEDURE — 83036 HEMOGLOBIN GLYCOSYLATED A1C: CPT

## 2021-12-14 PROCEDURE — 86900 BLOOD TYPING SEROLOGIC ABO: CPT

## 2021-12-14 PROCEDURE — 93005 ELECTROCARDIOGRAM TRACING: CPT

## 2021-12-14 PROCEDURE — 36415 COLL VENOUS BLD VENIPUNCTURE: CPT

## 2021-12-14 PROCEDURE — 86901 BLOOD TYPING SEROLOGIC RH(D): CPT

## 2021-12-14 PROCEDURE — C9803 HOPD COVID-19 SPEC COLLECT: HCPCS

## 2021-12-14 PROCEDURE — U0004 COV-19 TEST NON-CDC HGH THRU: HCPCS

## 2021-12-14 PROCEDURE — 86850 RBC ANTIBODY SCREEN: CPT

## 2021-12-14 PROCEDURE — 80053 COMPREHEN METABOLIC PANEL: CPT

## 2021-12-14 RX ORDER — LOPERAMIDE HYDROCHLORIDE 2 MG/1
2 CAPSULE ORAL EVERY 6 HOURS PRN
COMMUNITY

## 2021-12-15 ENCOUNTER — ANESTHESIA EVENT (OUTPATIENT)
Dept: PERIOP | Facility: HOSPITAL | Age: 56
DRG: 331 | End: 2021-12-15
Payer: COMMERCIAL

## 2021-12-15 RX ORDER — FAMOTIDINE 10 MG/ML
20 INJECTION, SOLUTION INTRAVENOUS ONCE
Status: CANCELLED | OUTPATIENT
Start: 2021-12-15 | End: 2021-12-15

## 2021-12-16 ENCOUNTER — ANESTHESIA (OUTPATIENT)
Dept: PERIOP | Facility: HOSPITAL | Age: 56
DRG: 331 | End: 2021-12-16
Payer: COMMERCIAL

## 2021-12-16 ENCOUNTER — HOSPITAL ENCOUNTER (INPATIENT)
Facility: HOSPITAL | Age: 56
LOS: 2 days | Discharge: HOME | DRG: 331 | End: 2021-12-18
Attending: COLON & RECTAL SURGERY | Admitting: COLON & RECTAL SURGERY
Payer: COMMERCIAL

## 2021-12-16 ENCOUNTER — ANESTHESIA EVENT CONVERTED (OUTPATIENT)
Dept: ANESTHESIOLOGY | Facility: HOSPITAL | Age: 56
DRG: 331 | End: 2021-12-16
Payer: COMMERCIAL

## 2021-12-16 DIAGNOSIS — K51.90 ULCERATIVE COLITIS: ICD-10-CM

## 2021-12-16 DIAGNOSIS — Z98.890 STATUS POST REVERSAL OF ILEOSTOMY: Primary | ICD-10-CM

## 2021-12-16 PROCEDURE — 25010000002 FENTANYL CITRATE (PF) 50 MCG/ML SOLUTION

## 2021-12-16 PROCEDURE — 25010000002 SODIUM CHLORIDE 0.9 % WITH KCL 20 MEQ 20-0.9 MEQ/L-% SOLUTION: Performed by: COLON & RECTAL SURGERY

## 2021-12-16 PROCEDURE — 25010000002 DEXAMETHASONE PER 1 MG

## 2021-12-16 PROCEDURE — 25010000002 ERTAPENEM PER 500 MG

## 2021-12-16 PROCEDURE — 25010000002 SUCCINYLCHOLINE PER 20 MG

## 2021-12-16 PROCEDURE — 25010000002 NALOXONE PER 1 MG

## 2021-12-16 PROCEDURE — C1889 IMPLANT/INSERT DEVICE, NOC: HCPCS | Performed by: COLON & RECTAL SURGERY

## 2021-12-16 PROCEDURE — 25010000002 ONDANSETRON PER 1 MG

## 2021-12-16 PROCEDURE — 25010000002 HEPARIN (PORCINE) PER 1000 UNITS: Performed by: COLON & RECTAL SURGERY

## 2021-12-16 PROCEDURE — 25010000002 DROPERIDOL PER 5 MG

## 2021-12-16 PROCEDURE — 0DBB0ZZ EXCISION OF ILEUM, OPEN APPROACH: ICD-10-PCS | Performed by: COLON & RECTAL SURGERY

## 2021-12-16 PROCEDURE — 88304 TISSUE EXAM BY PATHOLOGIST: CPT | Performed by: COLON & RECTAL SURGERY

## 2021-12-16 PROCEDURE — 25010000002 PROPOFOL 10 MG/ML EMULSION

## 2021-12-16 PROCEDURE — 25010000002 HYDROMORPHONE 1 MG/ML SOLUTION

## 2021-12-16 DEVICE — PROXIMATE RELOADABLE LINEAR STAPLER, 60MM
Type: IMPLANTABLE DEVICE | Site: ABDOMEN | Status: FUNCTIONAL
Brand: PROXIMATE

## 2021-12-16 DEVICE — PROXIMATE RELOADABLE LINEAR CUTTER WITH SAFETY LOCK-OUT, 75MM
Type: IMPLANTABLE DEVICE | Site: ABDOMEN | Status: FUNCTIONAL
Brand: PROXIMATE

## 2021-12-16 RX ORDER — MEPERIDINE HYDROCHLORIDE 25 MG/ML
12.5 INJECTION INTRAMUSCULAR; INTRAVENOUS; SUBCUTANEOUS
Status: DISCONTINUED | OUTPATIENT
Start: 2021-12-16 | End: 2021-12-16 | Stop reason: HOSPADM

## 2021-12-16 RX ORDER — PROPOFOL 10 MG/ML
VIAL (ML) INTRAVENOUS AS NEEDED
Status: DISCONTINUED | OUTPATIENT
Start: 2021-12-16 | End: 2021-12-16 | Stop reason: SURG

## 2021-12-16 RX ORDER — ONDANSETRON 2 MG/ML
4 INJECTION INTRAMUSCULAR; INTRAVENOUS EVERY 6 HOURS PRN
Status: DISCONTINUED | OUTPATIENT
Start: 2021-12-16 | End: 2021-12-16 | Stop reason: SDUPTHER

## 2021-12-16 RX ORDER — SODIUM CHLORIDE 0.9 % (FLUSH) 0.9 %
3 SYRINGE (ML) INJECTION EVERY 12 HOURS SCHEDULED
Status: DISCONTINUED | OUTPATIENT
Start: 2021-12-16 | End: 2021-12-16 | Stop reason: HOSPADM

## 2021-12-16 RX ORDER — KETOROLAC TROMETHAMINE 30 MG/ML
15 INJECTION, SOLUTION INTRAMUSCULAR; INTRAVENOUS EVERY 6 HOURS
Status: DISCONTINUED | OUTPATIENT
Start: 2021-12-16 | End: 2021-12-18 | Stop reason: HOSPADM

## 2021-12-16 RX ORDER — SCOLOPAMINE TRANSDERMAL SYSTEM 1 MG/1
1 PATCH, EXTENDED RELEASE TRANSDERMAL CONTINUOUS
Status: DISCONTINUED | OUTPATIENT
Start: 2021-12-16 | End: 2021-12-18 | Stop reason: HOSPADM

## 2021-12-16 RX ORDER — LABETALOL HYDROCHLORIDE 5 MG/ML
5 INJECTION, SOLUTION INTRAVENOUS
Status: DISCONTINUED | OUTPATIENT
Start: 2021-12-16 | End: 2021-12-16 | Stop reason: HOSPADM

## 2021-12-16 RX ORDER — DEXAMETHASONE SODIUM PHOSPHATE 10 MG/ML
INJECTION INTRAMUSCULAR; INTRAVENOUS AS NEEDED
Status: DISCONTINUED | OUTPATIENT
Start: 2021-12-16 | End: 2021-12-16 | Stop reason: SURG

## 2021-12-16 RX ORDER — ROCURONIUM BROMIDE 10 MG/ML
INJECTION, SOLUTION INTRAVENOUS AS NEEDED
Status: DISCONTINUED | OUTPATIENT
Start: 2021-12-16 | End: 2021-12-16 | Stop reason: SURG

## 2021-12-16 RX ORDER — FAMOTIDINE 20 MG/1
20 TABLET, FILM COATED ORAL ONCE
Status: COMPLETED | OUTPATIENT
Start: 2021-12-16 | End: 2021-12-16

## 2021-12-16 RX ORDER — ONDANSETRON 2 MG/ML
INJECTION INTRAMUSCULAR; INTRAVENOUS AS NEEDED
Status: DISCONTINUED | OUTPATIENT
Start: 2021-12-16 | End: 2021-12-16 | Stop reason: SURG

## 2021-12-16 RX ORDER — IBUPROFEN 400 MG/1
400 TABLET ORAL EVERY 6 HOURS PRN
Status: DISCONTINUED | OUTPATIENT
Start: 2021-12-16 | End: 2021-12-18 | Stop reason: HOSPADM

## 2021-12-16 RX ORDER — LIDOCAINE HYDROCHLORIDE 20 MG/ML
INJECTION, SOLUTION INFILTRATION; PERINEURAL AS NEEDED
Status: DISCONTINUED | OUTPATIENT
Start: 2021-12-16 | End: 2021-12-16 | Stop reason: SURG

## 2021-12-16 RX ORDER — DROPERIDOL 2.5 MG/ML
0.62 INJECTION, SOLUTION INTRAMUSCULAR; INTRAVENOUS ONCE AS NEEDED
Status: DISCONTINUED | OUTPATIENT
Start: 2021-12-16 | End: 2021-12-16 | Stop reason: HOSPADM

## 2021-12-16 RX ORDER — HYDRALAZINE HYDROCHLORIDE 20 MG/ML
5 INJECTION INTRAMUSCULAR; INTRAVENOUS
Status: DISCONTINUED | OUTPATIENT
Start: 2021-12-16 | End: 2021-12-16 | Stop reason: HOSPADM

## 2021-12-16 RX ORDER — HYDROCODONE BITARTRATE AND ACETAMINOPHEN 5; 325 MG/1; MG/1
TABLET ORAL
Status: COMPLETED
Start: 2021-12-16 | End: 2021-12-16

## 2021-12-16 RX ORDER — LIDOCAINE HYDROCHLORIDE 10 MG/ML
0.5 INJECTION, SOLUTION EPIDURAL; INFILTRATION; INTRACAUDAL; PERINEURAL ONCE AS NEEDED
Status: COMPLETED | OUTPATIENT
Start: 2021-12-16 | End: 2021-12-16

## 2021-12-16 RX ORDER — HEPARIN SODIUM 5000 [USP'U]/ML
5000 INJECTION, SOLUTION INTRAVENOUS; SUBCUTANEOUS EVERY 8 HOURS SCHEDULED
Status: DISCONTINUED | OUTPATIENT
Start: 2021-12-17 | End: 2021-12-18 | Stop reason: HOSPADM

## 2021-12-16 RX ORDER — SODIUM CHLORIDE 0.9 % (FLUSH) 0.9 %
10 SYRINGE (ML) INJECTION AS NEEDED
Status: DISCONTINUED | OUTPATIENT
Start: 2021-12-16 | End: 2021-12-16 | Stop reason: HOSPADM

## 2021-12-16 RX ORDER — NALOXONE HCL 0.4 MG/ML
0.4 VIAL (ML) INJECTION AS NEEDED
Status: DISCONTINUED | OUTPATIENT
Start: 2021-12-16 | End: 2021-12-16 | Stop reason: HOSPADM

## 2021-12-16 RX ORDER — SODIUM CHLORIDE 0.9 % (FLUSH) 0.9 %
3-10 SYRINGE (ML) INJECTION AS NEEDED
Status: DISCONTINUED | OUTPATIENT
Start: 2021-12-16 | End: 2021-12-16 | Stop reason: HOSPADM

## 2021-12-16 RX ORDER — SODIUM CHLORIDE AND POTASSIUM CHLORIDE 150; 900 MG/100ML; MG/100ML
100 INJECTION, SOLUTION INTRAVENOUS CONTINUOUS
Status: DISCONTINUED | OUTPATIENT
Start: 2021-12-16 | End: 2021-12-17

## 2021-12-16 RX ORDER — ONDANSETRON 2 MG/ML
4 INJECTION INTRAMUSCULAR; INTRAVENOUS EVERY 6 HOURS PRN
Status: DISCONTINUED | OUTPATIENT
Start: 2021-12-16 | End: 2021-12-18 | Stop reason: HOSPADM

## 2021-12-16 RX ORDER — SODIUM CHLORIDE 0.9 % (FLUSH) 0.9 %
10 SYRINGE (ML) INJECTION EVERY 12 HOURS SCHEDULED
Status: DISCONTINUED | OUTPATIENT
Start: 2021-12-16 | End: 2021-12-16 | Stop reason: HOSPADM

## 2021-12-16 RX ORDER — DIAZEPAM 5 MG/1
5 TABLET ORAL EVERY 6 HOURS PRN
Status: DISCONTINUED | OUTPATIENT
Start: 2021-12-16 | End: 2021-12-18 | Stop reason: HOSPADM

## 2021-12-16 RX ORDER — BUPIVACAINE HYDROCHLORIDE 2.5 MG/ML
INJECTION, SOLUTION EPIDURAL; INFILTRATION; INTRACAUDAL
Status: COMPLETED | OUTPATIENT
Start: 2021-12-16 | End: 2021-12-16

## 2021-12-16 RX ORDER — LABETALOL HYDROCHLORIDE 5 MG/ML
10 INJECTION, SOLUTION INTRAVENOUS EVERY 4 HOURS PRN
Status: DISCONTINUED | OUTPATIENT
Start: 2021-12-16 | End: 2021-12-18 | Stop reason: HOSPADM

## 2021-12-16 RX ORDER — PROMETHAZINE HYDROCHLORIDE 25 MG/1
25 TABLET ORAL ONCE AS NEEDED
Status: DISCONTINUED | OUTPATIENT
Start: 2021-12-16 | End: 2021-12-16 | Stop reason: HOSPADM

## 2021-12-16 RX ORDER — POLYVINYL ALCOHOL 14 MG/ML
2 SOLUTION/ DROPS OPHTHALMIC
Status: DISCONTINUED | OUTPATIENT
Start: 2021-12-16 | End: 2021-12-18 | Stop reason: HOSPADM

## 2021-12-16 RX ORDER — ACETAMINOPHEN 325 MG/1
650 TABLET ORAL EVERY 8 HOURS
Status: DISCONTINUED | OUTPATIENT
Start: 2021-12-16 | End: 2021-12-18 | Stop reason: HOSPADM

## 2021-12-16 RX ORDER — PREGABALIN 75 MG/1
75 CAPSULE ORAL ONCE
Status: COMPLETED | OUTPATIENT
Start: 2021-12-16 | End: 2021-12-16

## 2021-12-16 RX ORDER — HYDROCODONE BITARTRATE AND ACETAMINOPHEN 7.5; 325 MG/1; MG/1
1 TABLET ORAL EVERY 4 HOURS PRN
Status: DISCONTINUED | OUTPATIENT
Start: 2021-12-16 | End: 2021-12-18 | Stop reason: HOSPADM

## 2021-12-16 RX ORDER — ALVIMOPAN 12 MG/1
12 CAPSULE ORAL 2 TIMES DAILY
Status: DISCONTINUED | OUTPATIENT
Start: 2021-12-17 | End: 2021-12-17

## 2021-12-16 RX ORDER — SODIUM CHLORIDE, SODIUM LACTATE, POTASSIUM CHLORIDE, CALCIUM CHLORIDE 600; 310; 30; 20 MG/100ML; MG/100ML; MG/100ML; MG/100ML
9 INJECTION, SOLUTION INTRAVENOUS CONTINUOUS
Status: DISCONTINUED | OUTPATIENT
Start: 2021-12-16 | End: 2021-12-17

## 2021-12-16 RX ORDER — HYDROMORPHONE HYDROCHLORIDE 1 MG/ML
0.5 INJECTION, SOLUTION INTRAMUSCULAR; INTRAVENOUS; SUBCUTANEOUS
Status: DISCONTINUED | OUTPATIENT
Start: 2021-12-16 | End: 2021-12-16 | Stop reason: HOSPADM

## 2021-12-16 RX ORDER — ALVIMOPAN 12 MG/1
12 CAPSULE ORAL ONCE
Status: COMPLETED | OUTPATIENT
Start: 2021-12-16 | End: 2021-12-16

## 2021-12-16 RX ORDER — NALOXONE HCL 0.4 MG/ML
0.4 VIAL (ML) INJECTION
Status: DISCONTINUED | OUTPATIENT
Start: 2021-12-16 | End: 2021-12-18 | Stop reason: HOSPADM

## 2021-12-16 RX ORDER — IPRATROPIUM BROMIDE AND ALBUTEROL SULFATE 2.5; .5 MG/3ML; MG/3ML
3 SOLUTION RESPIRATORY (INHALATION) ONCE AS NEEDED
Status: DISCONTINUED | OUTPATIENT
Start: 2021-12-16 | End: 2021-12-16 | Stop reason: HOSPADM

## 2021-12-16 RX ORDER — ACETAMINOPHEN 500 MG
1000 TABLET ORAL ONCE
Status: COMPLETED | OUTPATIENT
Start: 2021-12-16 | End: 2021-12-16

## 2021-12-16 RX ORDER — ONDANSETRON 4 MG/1
4 TABLET, FILM COATED ORAL EVERY 6 HOURS PRN
Status: DISCONTINUED | OUTPATIENT
Start: 2021-12-16 | End: 2021-12-18 | Stop reason: HOSPADM

## 2021-12-16 RX ORDER — DROPERIDOL 2.5 MG/ML
0.62 INJECTION, SOLUTION INTRAMUSCULAR; INTRAVENOUS AS NEEDED
Status: DISCONTINUED | OUTPATIENT
Start: 2021-12-16 | End: 2021-12-16 | Stop reason: HOSPADM

## 2021-12-16 RX ORDER — ONDANSETRON 2 MG/ML
4 INJECTION INTRAMUSCULAR; INTRAVENOUS ONCE AS NEEDED
Status: DISCONTINUED | OUTPATIENT
Start: 2021-12-16 | End: 2021-12-16 | Stop reason: HOSPADM

## 2021-12-16 RX ORDER — CHOLECALCIFEROL (VITAMIN D3) 125 MCG
10 CAPSULE ORAL NIGHTLY PRN
Status: DISCONTINUED | OUTPATIENT
Start: 2021-12-16 | End: 2021-12-18 | Stop reason: HOSPADM

## 2021-12-16 RX ORDER — FENTANYL CITRATE 50 UG/ML
INJECTION, SOLUTION INTRAMUSCULAR; INTRAVENOUS AS NEEDED
Status: DISCONTINUED | OUTPATIENT
Start: 2021-12-16 | End: 2021-12-16 | Stop reason: SURG

## 2021-12-16 RX ORDER — TAMSULOSIN HYDROCHLORIDE 0.4 MG/1
0.4 CAPSULE ORAL DAILY
Status: DISCONTINUED | OUTPATIENT
Start: 2021-12-16 | End: 2021-12-18 | Stop reason: HOSPADM

## 2021-12-16 RX ORDER — MELOXICAM 15 MG/1
15 TABLET ORAL ONCE
Status: COMPLETED | OUTPATIENT
Start: 2021-12-16 | End: 2021-12-16

## 2021-12-16 RX ORDER — MIDAZOLAM HYDROCHLORIDE 1 MG/ML
1 INJECTION INTRAMUSCULAR; INTRAVENOUS
Status: DISCONTINUED | OUTPATIENT
Start: 2021-12-16 | End: 2021-12-16 | Stop reason: HOSPADM

## 2021-12-16 RX ORDER — HEPARIN SODIUM 5000 [USP'U]/ML
5000 INJECTION, SOLUTION INTRAVENOUS; SUBCUTANEOUS ONCE
Status: COMPLETED | OUTPATIENT
Start: 2021-12-16 | End: 2021-12-16

## 2021-12-16 RX ORDER — HYDROCODONE BITARTRATE AND ACETAMINOPHEN 5; 325 MG/1; MG/1
1 TABLET ORAL ONCE AS NEEDED
Status: DISCONTINUED | OUTPATIENT
Start: 2021-12-16 | End: 2021-12-16 | Stop reason: HOSPADM

## 2021-12-16 RX ORDER — FENTANYL CITRATE 50 UG/ML
50 INJECTION, SOLUTION INTRAMUSCULAR; INTRAVENOUS
Status: DISCONTINUED | OUTPATIENT
Start: 2021-12-16 | End: 2021-12-16 | Stop reason: HOSPADM

## 2021-12-16 RX ORDER — GABAPENTIN 300 MG/1
300 CAPSULE ORAL 3 TIMES DAILY
Status: DISCONTINUED | OUTPATIENT
Start: 2021-12-16 | End: 2021-12-18 | Stop reason: HOSPADM

## 2021-12-16 RX ORDER — HYDROMORPHONE HYDROCHLORIDE 1 MG/ML
0.5 INJECTION, SOLUTION INTRAMUSCULAR; INTRAVENOUS; SUBCUTANEOUS
Status: DISCONTINUED | OUTPATIENT
Start: 2021-12-16 | End: 2021-12-18 | Stop reason: HOSPADM

## 2021-12-16 RX ORDER — MAGNESIUM HYDROXIDE 1200 MG/15ML
LIQUID ORAL AS NEEDED
Status: DISCONTINUED | OUTPATIENT
Start: 2021-12-16 | End: 2021-12-16 | Stop reason: HOSPADM

## 2021-12-16 RX ORDER — PROMETHAZINE HYDROCHLORIDE 25 MG/1
25 SUPPOSITORY RECTAL ONCE AS NEEDED
Status: DISCONTINUED | OUTPATIENT
Start: 2021-12-16 | End: 2021-12-16 | Stop reason: HOSPADM

## 2021-12-16 RX ORDER — SUCCINYLCHOLINE CHLORIDE 20 MG/ML
INJECTION INTRAMUSCULAR; INTRAVENOUS AS NEEDED
Status: DISCONTINUED | OUTPATIENT
Start: 2021-12-16 | End: 2021-12-16 | Stop reason: SURG

## 2021-12-16 RX ADMIN — PROPOFOL 50 MG: 10 INJECTION, EMULSION INTRAVENOUS at 15:30

## 2021-12-16 RX ADMIN — BUPIVACAINE HYDROCHLORIDE 45 ML: 2.5 INJECTION, SOLUTION EPIDURAL; INFILTRATION; INTRACAUDAL at 14:41

## 2021-12-16 RX ADMIN — POTASSIUM CHLORIDE AND SODIUM CHLORIDE 100 ML/HR: 900; 150 INJECTION, SOLUTION INTRAVENOUS at 18:44

## 2021-12-16 RX ADMIN — NALOXONE HYDROCHLORIDE 0.08 MG: 0.4 INJECTION, SOLUTION INTRAMUSCULAR; INTRAVENOUS; SUBCUTANEOUS at 16:02

## 2021-12-16 RX ADMIN — ONDANSETRON 4 MG: 2 INJECTION INTRAMUSCULAR; INTRAVENOUS at 15:35

## 2021-12-16 RX ADMIN — SODIUM CHLORIDE, POTASSIUM CHLORIDE, SODIUM LACTATE AND CALCIUM CHLORIDE: 600; 310; 30; 20 INJECTION, SOLUTION INTRAVENOUS at 14:37

## 2021-12-16 RX ADMIN — MELOXICAM 15 MG: 15 TABLET ORAL at 13:49

## 2021-12-16 RX ADMIN — FENTANYL CITRATE 50 MCG: 50 INJECTION, SOLUTION INTRAMUSCULAR; INTRAVENOUS at 14:40

## 2021-12-16 RX ADMIN — GABAPENTIN 300 MG: 300 CAPSULE ORAL at 21:10

## 2021-12-16 RX ADMIN — ACETAMINOPHEN 1000 MG: 500 TABLET ORAL at 13:48

## 2021-12-16 RX ADMIN — ROCURONIUM BROMIDE 5 MG: 10 INJECTION, SOLUTION INTRAVENOUS at 14:40

## 2021-12-16 RX ADMIN — FENTANYL CITRATE 50 MCG: 50 INJECTION, SOLUTION INTRAMUSCULAR; INTRAVENOUS at 15:30

## 2021-12-16 RX ADMIN — LIDOCAINE HYDROCHLORIDE 50 MG: 20 INJECTION, SOLUTION INFILTRATION; PERINEURAL at 14:40

## 2021-12-16 RX ADMIN — DROPERIDOL 0.62 MG: 2.5 INJECTION, SOLUTION INTRAMUSCULAR; INTRAVENOUS at 16:46

## 2021-12-16 RX ADMIN — SUCCINYLCHOLINE CHLORIDE 140 MG: 20 INJECTION, SOLUTION INTRAMUSCULAR; INTRAVENOUS at 14:40

## 2021-12-16 RX ADMIN — SCOPALAMINE 1 PATCH: 1 PATCH, EXTENDED RELEASE TRANSDERMAL at 13:49

## 2021-12-16 RX ADMIN — PROPOFOL 200 MG: 10 INJECTION, EMULSION INTRAVENOUS at 14:40

## 2021-12-16 RX ADMIN — ACETAMINOPHEN 650 MG: 325 TABLET, FILM COATED ORAL at 21:10

## 2021-12-16 RX ADMIN — HEPARIN SODIUM 5000 UNITS: 5000 INJECTION, SOLUTION INTRAVENOUS; SUBCUTANEOUS at 13:51

## 2021-12-16 RX ADMIN — ALVIMOPAN 12 MG: 12 CAPSULE ORAL at 13:49

## 2021-12-16 RX ADMIN — ROCURONIUM BROMIDE 20 MG: 10 INJECTION, SOLUTION INTRAVENOUS at 15:30

## 2021-12-16 RX ADMIN — DEXAMETHASONE SODIUM PHOSPHATE 4 MG: 10 INJECTION INTRAMUSCULAR; INTRAVENOUS at 14:51

## 2021-12-16 RX ADMIN — HYDROMORPHONE HYDROCHLORIDE 0.5 MG: 1 INJECTION, SOLUTION INTRAMUSCULAR; INTRAVENOUS; SUBCUTANEOUS at 16:45

## 2021-12-16 RX ADMIN — TAMSULOSIN HYDROCHLORIDE 0.4 MG: 0.4 CAPSULE ORAL at 21:09

## 2021-12-16 RX ADMIN — NALOXONE HYDROCHLORIDE 0.04 MG: 0.4 INJECTION, SOLUTION INTRAMUSCULAR; INTRAVENOUS; SUBCUTANEOUS at 15:59

## 2021-12-16 RX ADMIN — SUGAMMADEX 200 MG: 100 INJECTION, SOLUTION INTRAVENOUS at 15:47

## 2021-12-16 RX ADMIN — SODIUM CHLORIDE, POTASSIUM CHLORIDE, SODIUM LACTATE AND CALCIUM CHLORIDE 9 ML/HR: 600; 310; 30; 20 INJECTION, SOLUTION INTRAVENOUS at 13:26

## 2021-12-16 RX ADMIN — LIDOCAINE HYDROCHLORIDE 0.2 ML: 10 INJECTION, SOLUTION EPIDURAL; INFILTRATION; INTRACAUDAL; PERINEURAL at 13:26

## 2021-12-16 RX ADMIN — PREGABALIN 75 MG: 75 CAPSULE ORAL at 13:48

## 2021-12-16 RX ADMIN — FAMOTIDINE 20 MG: 20 TABLET ORAL at 13:48

## 2021-12-16 RX ADMIN — HYDROCODONE BITARTRATE AND ACETAMINOPHEN 1 TABLET: 5; 325 TABLET ORAL at 17:03

## 2021-12-16 RX ADMIN — ROCURONIUM BROMIDE 35 MG: 10 INJECTION, SOLUTION INTRAVENOUS at 14:44

## 2021-12-16 RX ADMIN — Medication 1 G: at 14:51

## 2021-12-16 NOTE — ANESTHESIA PROCEDURE NOTES
Airway  Urgency: elective    Date/Time: 12/16/2021 2:41 PM  Airway not difficult    General Information and Staff    Patient location during procedure: OR  Anesthesiologist: Kailash Ortega MD  CRNA: Schirmer, Shelley P, CRNA    Indications and Patient Condition  Indications for airway management: airway protection    Preoxygenated: yes  MILS not maintained throughout  Mask difficulty assessment: 2 - vent by mask + OA or adjuvant +/- NMBA    Final Airway Details  Final airway type: endotracheal airway      Successful airway: ETT  Cuffed: yes   Successful intubation technique: video laryngoscopy  Endotracheal tube insertion site: oral  Blade: Sewell  Blade size: 4  ETT size (mm): 7.5  Cormack-Lehane Classification: grade I - full view of glottis  Placement verified by: chest auscultation and capnometry   Measured from: lips  ETT/EBT  to lips (cm): 21  Number of attempts at approach: 1  Assessment: lips, teeth, and gum same as pre-op and atraumatic intubation    Additional Comments  Negative epigastric sounds, Breath sound equal bilaterally with symmetric chest rise and fall

## 2021-12-16 NOTE — ANESTHESIA PROCEDURE NOTES
Peripheral Block      Patient reassessed immediately prior to procedure    Patient location during procedure: OR  Start time: 12/16/2021 2:41 PM  Stop time: 12/16/2021 2:45 PM  Reason for block: at surgeon's request and post-op pain management  Performed by  Anesthesiologist: Peter Buckner MD  Preanesthetic Checklist  Completed: patient identified, IV checked, site marked, risks and benefits discussed, surgical consent, monitors and equipment checked, pre-op evaluation and timeout performed  Prep:  Pt Position: supine  Sterile barriers:cap, gloves, sterile barriers and mask  Prep: ChloraPrep  Patient monitoring: blood pressure monitoring, continuous pulse oximetry and EKG  Procedure    Sedation: yes  Performed under: general  Guidance:ultrasound guided  Images:still images obtained, printed/placed on chart    Laterality:right  Block Type:TAP  Injection Technique:single-shot  Needle Type:short-bevel and echogenic  Needle Gauge:20 G  Resistance on Injection: none    Medications Used: bupivacaine PF (MARCAINE) 0.25 % injection, 45 mL  Med administered at 12/16/2021 2:41 PM      Medications  Comment:        Post Assessment  Injection Assessment: negative aspiration for heme, incremental injection and no paresthesia on injection  Patient Tolerance:comfortable throughout block  Complications:no  Additional Notes      Under Ultrasound guidance, a BBraun 4inch 360 degree needle was advanced with Normal Saline hydro dissection of tissue.  The Internal Oblique and Transversus Abdominus muscles where visualized.  At or before the aponeurosis of Internal Oblique, local anesthetic spread was visualized in the Transversus Abdominus Plane. Injection was made incrementally with aspiration every 5 mls.  There was no  intravascular injection,  injection pressure was normal, there was no neural injection, and the procedure was completed without difficulty.  Thank You.

## 2021-12-16 NOTE — ANESTHESIA POSTPROCEDURE EVALUATION
Patient: Craig Smalls    Procedure Summary     Date: 12/16/21 Room / Location:  [facility] /  [facility]    Anesthesia Start: 1437 Anesthesia Stop:     Procedure: ILEOSTOMY EXCISION AND TAKEDOWN (N/A ) Diagnosis:     Surgeons: Silverio Montero MD Provider: Kailash Ortega MD    Anesthesia Type: general ASA Status: 2          Anesthesia Type: general    Vitals  No vitals data found for the desired time range.          Post Anesthesia Care and Evaluation    Patient location during evaluation: PACU  Patient participation: waiting for patient participation  Level of consciousness: responsive to light touch  Pain management: adequate  Airway patency: patent  Anesthetic complications: No anesthetic complications  PONV Status: none  Cardiovascular status: hemodynamically stable and acceptable  Respiratory status: nonlabored ventilation, acceptable and nasal cannula  Hydration status: acceptable

## 2021-12-16 NOTE — ANESTHESIA PREPROCEDURE EVALUATION
Anesthesia Evaluation     Patient summary reviewed and Nursing notes reviewed   no history of anesthetic complications:  NPO Solid Status: > 8 hours  NPO Liquid Status: > 8 hours           Airway   Mallampati: II  TM distance: >3 FB  Neck ROM: full  No difficulty expected  Dental      Pulmonary - negative pulmonary ROS and normal exam   Cardiovascular - negative cardio ROS and normal exam        Neuro/Psych- negative ROS  GI/Hepatic/Renal/Endo      Musculoskeletal     Abdominal    Substance History      OB/GYN          Other                        Anesthesia Plan    ASA 2     general     intravenous induction     Anesthetic plan, all risks, benefits, and alternatives have been provided, discussed and informed consent has been obtained with: patient.    Plan discussed with CRNA.

## 2021-12-17 LAB
ANION GAP SERPL CALCULATED.3IONS-SCNC: 10 MMOL/L (ref 5–15)
BASOPHILS # BLD AUTO: 0.01 10*3/MM3 (ref 0–0.2)
BASOPHILS NFR BLD AUTO: 0.1 % (ref 0–1.5)
BUN SERPL-MCNC: 15 MG/DL (ref 6–20)
BUN/CREAT SERPL: 19.5 (ref 7–25)
CALCIUM SPEC-SCNC: 8.8 MG/DL (ref 8.6–10.5)
CHLORIDE SERPL-SCNC: 107 MMOL/L (ref 98–107)
CO2 SERPL-SCNC: 22 MMOL/L (ref 22–29)
CREAT SERPL-MCNC: 0.77 MG/DL (ref 0.76–1.27)
DEPRECATED RDW RBC AUTO: 42.5 FL (ref 37–54)
EOSINOPHIL # BLD AUTO: 0.18 10*3/MM3 (ref 0–0.4)
EOSINOPHIL NFR BLD AUTO: 1.9 % (ref 0.3–6.2)
ERYTHROCYTE [DISTWIDTH] IN BLOOD BY AUTOMATED COUNT: 12.3 % (ref 12.3–15.4)
GFR SERPL CREATININE-BSD FRML MDRD: 104 ML/MIN/1.73
GLUCOSE SERPL-MCNC: 107 MG/DL (ref 65–99)
HCT VFR BLD AUTO: 39 % (ref 37.5–51)
HGB BLD-MCNC: 13.1 G/DL (ref 13–17.7)
IMM GRANULOCYTES # BLD AUTO: 0.02 10*3/MM3 (ref 0–0.05)
IMM GRANULOCYTES NFR BLD AUTO: 0.2 % (ref 0–0.5)
LYMPHOCYTES # BLD AUTO: 1.1 10*3/MM3 (ref 0.7–3.1)
LYMPHOCYTES NFR BLD AUTO: 11.8 % (ref 19.6–45.3)
MCH RBC QN AUTO: 31.7 PG (ref 26.6–33)
MCHC RBC AUTO-ENTMCNC: 33.6 G/DL (ref 31.5–35.7)
MCV RBC AUTO: 94.4 FL (ref 79–97)
MONOCYTES # BLD AUTO: 0.67 10*3/MM3 (ref 0.1–0.9)
MONOCYTES NFR BLD AUTO: 7.2 % (ref 5–12)
NEUTROPHILS NFR BLD AUTO: 7.34 10*3/MM3 (ref 1.7–7)
NEUTROPHILS NFR BLD AUTO: 78.8 % (ref 42.7–76)
NRBC BLD AUTO-RTO: 0 /100 WBC (ref 0–0.2)
PLATELET # BLD AUTO: 249 10*3/MM3 (ref 140–450)
PMV BLD AUTO: 10 FL (ref 6–12)
POTASSIUM SERPL-SCNC: 4.9 MMOL/L (ref 3.5–5.2)
RBC # BLD AUTO: 4.13 10*6/MM3 (ref 4.14–5.8)
SODIUM SERPL-SCNC: 139 MMOL/L (ref 136–145)
WBC NRBC COR # BLD: 9.32 10*3/MM3 (ref 3.4–10.8)

## 2021-12-17 PROCEDURE — 80048 BASIC METABOLIC PNL TOTAL CA: CPT | Performed by: COLON & RECTAL SURGERY

## 2021-12-17 PROCEDURE — 25010000002 SODIUM CHLORIDE 0.9 % WITH KCL 20 MEQ 20-0.9 MEQ/L-% SOLUTION: Performed by: COLON & RECTAL SURGERY

## 2021-12-17 PROCEDURE — 85025 COMPLETE CBC W/AUTO DIFF WBC: CPT | Performed by: COLON & RECTAL SURGERY

## 2021-12-17 PROCEDURE — 97161 PT EVAL LOW COMPLEX 20 MIN: CPT | Performed by: PHYSICAL THERAPIST

## 2021-12-17 PROCEDURE — 25010000002 KETOROLAC TROMETHAMINE PER 15 MG: Performed by: COLON & RECTAL SURGERY

## 2021-12-17 PROCEDURE — 25010000002 HEPARIN (PORCINE) PER 1000 UNITS: Performed by: COLON & RECTAL SURGERY

## 2021-12-17 RX ADMIN — GABAPENTIN 300 MG: 300 CAPSULE ORAL at 16:28

## 2021-12-17 RX ADMIN — KETOROLAC TROMETHAMINE 15 MG: 30 INJECTION, SOLUTION INTRAMUSCULAR at 16:28

## 2021-12-17 RX ADMIN — KETOROLAC TROMETHAMINE 15 MG: 30 INJECTION, SOLUTION INTRAMUSCULAR at 12:51

## 2021-12-17 RX ADMIN — KETOROLAC TROMETHAMINE 15 MG: 30 INJECTION, SOLUTION INTRAMUSCULAR at 05:14

## 2021-12-17 RX ADMIN — POTASSIUM CHLORIDE AND SODIUM CHLORIDE 100 ML/HR: 900; 150 INJECTION, SOLUTION INTRAVENOUS at 11:36

## 2021-12-17 RX ADMIN — HEPARIN SODIUM 5000 UNITS: 5000 INJECTION, SOLUTION INTRAVENOUS; SUBCUTANEOUS at 20:00

## 2021-12-17 RX ADMIN — ACETAMINOPHEN 650 MG: 325 TABLET, FILM COATED ORAL at 05:13

## 2021-12-17 RX ADMIN — ACETAMINOPHEN 650 MG: 325 TABLET, FILM COATED ORAL at 12:51

## 2021-12-17 RX ADMIN — KETOROLAC TROMETHAMINE 15 MG: 30 INJECTION, SOLUTION INTRAMUSCULAR at 01:19

## 2021-12-17 RX ADMIN — ALVIMOPAN 12 MG: 12 CAPSULE ORAL at 20:00

## 2021-12-17 RX ADMIN — GABAPENTIN 300 MG: 300 CAPSULE ORAL at 08:39

## 2021-12-17 RX ADMIN — ACETAMINOPHEN 650 MG: 325 TABLET, FILM COATED ORAL at 20:00

## 2021-12-17 RX ADMIN — HEPARIN SODIUM 5000 UNITS: 5000 INJECTION, SOLUTION INTRAVENOUS; SUBCUTANEOUS at 05:14

## 2021-12-17 RX ADMIN — ALVIMOPAN 12 MG: 12 CAPSULE ORAL at 08:39

## 2021-12-17 RX ADMIN — POTASSIUM CHLORIDE AND SODIUM CHLORIDE 100 ML/HR: 900; 150 INJECTION, SOLUTION INTRAVENOUS at 01:24

## 2021-12-17 RX ADMIN — KETOROLAC TROMETHAMINE 15 MG: 30 INJECTION, SOLUTION INTRAMUSCULAR at 22:51

## 2021-12-17 RX ADMIN — GABAPENTIN 300 MG: 300 CAPSULE ORAL at 20:00

## 2021-12-17 RX ADMIN — HYDROCODONE BITARTRATE AND ACETAMINOPHEN 1 TABLET: 7.5; 325 TABLET ORAL at 08:39

## 2021-12-18 VITALS
SYSTOLIC BLOOD PRESSURE: 107 MMHG | HEART RATE: 62 BPM | DIASTOLIC BLOOD PRESSURE: 72 MMHG | WEIGHT: 160 LBS | BODY MASS INDEX: 23.7 KG/M2 | RESPIRATION RATE: 18 BRPM | TEMPERATURE: 98.6 F | OXYGEN SATURATION: 92 % | HEIGHT: 69 IN

## 2021-12-18 PROBLEM — G89.18 ACUTE POSTOPERATIVE PAIN: Status: ACTIVE | Noted: 2021-12-18

## 2021-12-18 LAB
BASOPHILS # BLD AUTO: 0.03 10*3/MM3 (ref 0–0.2)
BASOPHILS NFR BLD AUTO: 0.4 % (ref 0–1.5)
DEPRECATED RDW RBC AUTO: 43.4 FL (ref 37–54)
EOSINOPHIL # BLD AUTO: 0.18 10*3/MM3 (ref 0–0.4)
EOSINOPHIL NFR BLD AUTO: 2.2 % (ref 0.3–6.2)
ERYTHROCYTE [DISTWIDTH] IN BLOOD BY AUTOMATED COUNT: 12.7 % (ref 12.3–15.4)
HCT VFR BLD AUTO: 40 % (ref 37.5–51)
HGB BLD-MCNC: 13.3 G/DL (ref 13–17.7)
IMM GRANULOCYTES # BLD AUTO: 0.02 10*3/MM3 (ref 0–0.05)
IMM GRANULOCYTES NFR BLD AUTO: 0.2 % (ref 0–0.5)
LYMPHOCYTES # BLD AUTO: 1.6 10*3/MM3 (ref 0.7–3.1)
LYMPHOCYTES NFR BLD AUTO: 19.6 % (ref 19.6–45.3)
MCH RBC QN AUTO: 31 PG (ref 26.6–33)
MCHC RBC AUTO-ENTMCNC: 33.3 G/DL (ref 31.5–35.7)
MCV RBC AUTO: 93.2 FL (ref 79–97)
MONOCYTES # BLD AUTO: 0.61 10*3/MM3 (ref 0.1–0.9)
MONOCYTES NFR BLD AUTO: 7.5 % (ref 5–12)
NEUTROPHILS NFR BLD AUTO: 5.72 10*3/MM3 (ref 1.7–7)
NEUTROPHILS NFR BLD AUTO: 70.1 % (ref 42.7–76)
NRBC BLD AUTO-RTO: 0 /100 WBC (ref 0–0.2)
PLATELET # BLD AUTO: 248 10*3/MM3 (ref 140–450)
PMV BLD AUTO: 10.1 FL (ref 6–12)
RBC # BLD AUTO: 4.29 10*6/MM3 (ref 4.14–5.8)
WBC NRBC COR # BLD: 8.16 10*3/MM3 (ref 3.4–10.8)

## 2021-12-18 PROCEDURE — 25010000002 KETOROLAC TROMETHAMINE PER 15 MG: Performed by: COLON & RECTAL SURGERY

## 2021-12-18 PROCEDURE — 25010000002 ONDANSETRON PER 1 MG: Performed by: COLON & RECTAL SURGERY

## 2021-12-18 PROCEDURE — 25010000002 HEPARIN (PORCINE) PER 1000 UNITS: Performed by: COLON & RECTAL SURGERY

## 2021-12-18 PROCEDURE — 85025 COMPLETE CBC W/AUTO DIFF WBC: CPT | Performed by: COLON & RECTAL SURGERY

## 2021-12-18 RX ORDER — ONDANSETRON 4 MG/1
4 TABLET, FILM COATED ORAL EVERY 8 HOURS PRN
Qty: 20 TABLET | Refills: 0 | Status: SHIPPED | OUTPATIENT
Start: 2021-12-18

## 2021-12-18 RX ORDER — HYDROCODONE BITARTRATE AND ACETAMINOPHEN 7.5; 325 MG/1; MG/1
1 TABLET ORAL EVERY 4 HOURS PRN
Qty: 25 TABLET | Refills: 0 | Status: SHIPPED | OUTPATIENT
Start: 2021-12-18

## 2021-12-18 RX ADMIN — KETOROLAC TROMETHAMINE 15 MG: 30 INJECTION, SOLUTION INTRAMUSCULAR at 05:30

## 2021-12-18 RX ADMIN — ACETAMINOPHEN 650 MG: 325 TABLET, FILM COATED ORAL at 11:17

## 2021-12-18 RX ADMIN — GABAPENTIN 300 MG: 300 CAPSULE ORAL at 09:06

## 2021-12-18 RX ADMIN — KETOROLAC TROMETHAMINE 15 MG: 30 INJECTION, SOLUTION INTRAMUSCULAR at 11:17

## 2021-12-18 RX ADMIN — HEPARIN SODIUM 5000 UNITS: 5000 INJECTION, SOLUTION INTRAVENOUS; SUBCUTANEOUS at 05:29

## 2021-12-18 RX ADMIN — ONDANSETRON 4 MG: 2 INJECTION INTRAMUSCULAR; INTRAVENOUS at 06:35

## 2021-12-18 RX ADMIN — TAMSULOSIN HYDROCHLORIDE 0.4 MG: 0.4 CAPSULE ORAL at 09:06

## 2021-12-18 RX ADMIN — ACETAMINOPHEN 650 MG: 325 TABLET, FILM COATED ORAL at 05:30

## 2021-12-20 LAB
CYTO UR: NORMAL
LAB AP CASE REPORT: NORMAL
LAB AP CLINICAL INFORMATION: NORMAL
PATH REPORT.FINAL DX SPEC: NORMAL
PATH REPORT.GROSS SPEC: NORMAL

## 2022-12-09 NOTE — NURSING NOTE
[facility] nurse follow-up:     Appliance change performed and discharge supplies provided.     Appliance was intact but I wanted to put him in a new appliance prior to him leaving today.     Stoma looks good.   Good output and is thicker this morning.   MC separation is getting better.   Stoma measures 38mm.   Placed him in a ConvaTec V0.     Midline incision looks good.   Small amount of drainage continues.   Packed open areas with moist 4x4 gauze to help wick moisture away from Ostomy appliance.   Covered with Covaderm dressing.     Reviewed care needs, diet, hydration, and protein intake.   Provided discharge supplies.     Patient is to contact [facility] nurse if needs arise.   HH to follow once home.   Will send sample appliances to his home.     Thanks        No

## 2024-10-16 ENCOUNTER — TRANSCRIBE ORDERS (OUTPATIENT)
Dept: ADMINISTRATIVE | Facility: HOSPITAL | Age: 59
End: 2024-10-16
Payer: COMMERCIAL

## 2024-10-16 DIAGNOSIS — R10.32 LEFT LOWER QUADRANT PAIN: Primary | ICD-10-CM

## 2024-10-23 ENCOUNTER — HOSPITAL ENCOUNTER (OUTPATIENT)
Facility: HOSPITAL | Age: 59
Discharge: HOME | End: 2024-10-23
Admitting: COLON & RECTAL SURGERY
Payer: COMMERCIAL

## 2024-10-23 DIAGNOSIS — R10.32 LEFT LOWER QUADRANT PAIN: ICD-10-CM

## 2024-10-23 PROCEDURE — 74177 CT ABD & PELVIS W/CONTRAST: CPT

## 2024-10-23 PROCEDURE — 25510000001 IOPAMIDOL 61 % SOLUTION: Performed by: COLON & RECTAL SURGERY

## 2024-10-23 PROCEDURE — 0 DIATRIZOATE MEGLUMINE & SODIUM PER 1 ML: Performed by: COLON & RECTAL SURGERY

## 2024-10-23 RX ORDER — DIATRIZOATE MEGLUMINE AND DIATRIZOATE SODIUM 660; 100 MG/ML; MG/ML
15 SOLUTION ORAL; RECTAL ONCE
Status: COMPLETED | OUTPATIENT
Start: 2024-10-23 | End: 2024-10-23

## 2024-10-23 RX ORDER — IOPAMIDOL 612 MG/ML
100 INJECTION, SOLUTION INTRAVASCULAR
Status: COMPLETED | OUTPATIENT
Start: 2024-10-23 | End: 2024-10-23

## 2024-10-23 RX ADMIN — IOPAMIDOL 85 ML: 612 INJECTION, SOLUTION INTRAVENOUS at 16:35

## 2024-10-23 RX ADMIN — DIATRIZOATE MEGLUMINE AND DIATRIZOATE SODIUM 15 ML: 660; 100 SOLUTION ORAL; RECTAL at 15:30

## (undated) DEVICE — SUT VIC 2/0 SUTUPAK TIES 18IN J911T

## (undated) DEVICE — SUT MNCRYL PLS ANTIB UD 4/0 PS2 18IN

## (undated) DEVICE — POOLE SUCTION INSTRUMENT WITH REMOVABLE SHEATH: Brand: POOLE

## (undated) DEVICE — DRAPE,UNDERBUTTOCKS,PCH,STERILE: Brand: MEDLINE

## (undated) DEVICE — SUT VIC 0 CTD BR 18IN UNDYE VCP724D

## (undated) DEVICE — DEV OPN LIGASURE CRV 180D 36MM 13.5CM  1P/U

## (undated) DEVICE — 3M™ IOBAN™ 2 ANTIMICROBIAL INCISE DRAPE 6650EZ: Brand: IOBAN™ 2

## (undated) DEVICE — CVR HNDL LIGHT RIGID

## (undated) DEVICE — LEGGINGS, PAIR, 29X43, STERILE: Brand: MEDLINE

## (undated) DEVICE — TP UMB COTN 30IN U11T

## (undated) DEVICE — SUT VIC 3/0 TIES J104T

## (undated) DEVICE — PK MINOR SPLT 10

## (undated) DEVICE — SUT PDS 1 CTX 36IN VIO PDP371T

## (undated) DEVICE — DRAPE,UNDERBUTTOCKS,STERILE: Brand: MEDLINE

## (undated) DEVICE — SUT VIC PLS CTD ANTIB 2/0 18IN VCP111G

## (undated) DEVICE — GOWN,REINFORCE,POLY,SIRUS,BREATH SLV,XLG: Brand: MEDLINE

## (undated) DEVICE — SUT PROLN 2/0 KS 30IN 8623H

## (undated) DEVICE — LEX BASIC NO DRAPE: Brand: MEDLINE INDUSTRIES, INC.

## (undated) DEVICE — GLV SURG SENSICARE PI MIC PF SZ7.5 LF STRL

## (undated) DEVICE — ANTIBACTERIAL UNDYED BRAIDED (POLYGLACTIN 910), SYNTHETIC ABSORBABLE SUTURE: Brand: COATED VICRYL

## (undated) DEVICE — GLV SURG SENSICARE PI ORTHO PF SZ7 LF STRL

## (undated) DEVICE — DBD-DRAPE,LAP,CHOLE,W/TROUGHS,STERILE: Brand: MEDLINE

## (undated) DEVICE — MEDI-VAC YANKAUER SUCTION HANDLE: Brand: CARDINAL HEALTH

## (undated) DEVICE — GOWN,REINF,POLY,ECL,PP SLV,XL: Brand: MEDLINE

## (undated) DEVICE — TBG SXN CONN/F 1/2IN 100FT N/S

## (undated) DEVICE — SUT PDS LP 1 TP1 96IN VIO PDP880GA

## (undated) DEVICE — SUT SILK 2/0 SH 30IN K833H

## (undated) DEVICE — ROD OS LP SURFIT 2 1/2IN

## (undated) DEVICE — SUT SILK 2/0 PS 18IN 1588H

## (undated) DEVICE — GLV SURG SENSICARE PI MIC PF SZ6.5 LF STRL

## (undated) DEVICE — TUBING, SUCTION, 1/4" X 10', STRAIGHT: Brand: MEDLINE

## (undated) DEVICE — TOWEL,OR,DSP,ST,BLUE,STD,4/PK,20PK/CS: Brand: MEDLINE

## (undated) DEVICE — INTENDED FOR TISSUE SEPARATION, AND OTHER PROCEDURES THAT REQUIRE A SHARP SURGICAL BLADE TO PUNCTURE OR CUT.: Brand: BARD-PARKER ® STAINLESS STEEL BLADES

## (undated) DEVICE — TOTAL TRAY, 16FR 10ML SIL FOLEY, URN: Brand: MEDLINE

## (undated) DEVICE — JELLY,LUBE,STERILE,FLIP TOP,TUBE,2-OZ: Brand: MEDLINE

## (undated) DEVICE — PROXIMATE RH ROTATING HEAD SKIN STAPLERS (35 WIDE) CONTAINS 35 STAINLESS STEEL STAPLES: Brand: PROXIMATE

## (undated) DEVICE — JP PERF DRN SIL FLT 10MM FULL: Brand: CARDINAL HEALTH

## (undated) DEVICE — INTENDED TO BE USED TO OCCLUDE, RETRACT AND IDENTIFY ARTERIES, VEINS, TENDONS AND NERVES IN SURGICAL PROCEDURES: Brand: STERION®  VESSEL LOOP

## (undated) DEVICE — COVER,MAYO STAND,XL,STERILE: Brand: MEDLINE

## (undated) DEVICE — KT POSTN TRENDELENBURG NBXL PAD WING STRAP TABL HDRST XLG

## (undated) DEVICE — PREMIUM DRY TRAY LF: Brand: MEDLINE INDUSTRIES, INC.

## (undated) DEVICE — STERILE PVP: Brand: MEDLINE INDUSTRIES, INC.

## (undated) DEVICE — APPL CHLORAPREP TINTED 26ML TEAL

## (undated) DEVICE — SKIN AFFIX SURG ADHESIVE 72/CS 0.55ML: Brand: MEDLINE

## (undated) DEVICE — PENCL ROCKRSWCH MEGADYNE W/HOLSTR 10FT SS

## (undated) DEVICE — DRSNG WND BORDR/ADHS NONADHR/GZ LF 4X14IN STRL

## (undated) DEVICE — BARRIER, ABSORBABLE, ADHESION: Brand: SEPRAFILM®

## (undated) DEVICE — NITINOL WIRE WITH HYDROPHILIC TIP: Brand: SENSOR

## (undated) DEVICE — LEX GENERAL ABDOMINAL SPLIT: Brand: MEDLINE INDUSTRIES, INC.

## (undated) DEVICE — SUT VIC PLS 1 CTX 18IN VIL VCP365H

## (undated) DEVICE — SAFESECURE,SECUREMENT,FOLEY CATH,STERILE: Brand: MEDLINE

## (undated) DEVICE — CLTH CLENS READYCLEANSE PERI CARE PK/5

## (undated) DEVICE — 3M™ STERI-DRAPE™ INSTRUMENT POUCH 1018: Brand: STERI-DRAPE™

## (undated) DEVICE — SPNG LAP PREWSH SFTPK 18X18IN STRL PK/5

## (undated) DEVICE — SUCTION RESERVOIR 400CC LG VOL: Brand: CARDINAL HEALTH

## (undated) DEVICE — SOL BETADINE 1GL

## (undated) DEVICE — SUT VIC 0 TIES 18IN J912G

## (undated) DEVICE — DRSNG WND BORDR/ADHS NONADHR/GZ LF 2X2IN STRL

## (undated) DEVICE — TRAP FLD MINIVAC MEGADYNE 100ML

## (undated) DEVICE — PENCL E/S ULTRAVAC TELESCP NOSE HOLSTR 10FT

## (undated) DEVICE — DRSNG WND BORDR/ADHS NONADHR/GZ LF 4X4IN STRL

## (undated) DEVICE — DRSNG WND BORDR/ADHS NONADHR/GZ LF 4X10IN STRL

## (undated) DEVICE — DRSNG PAD ABD 8X10IN STRL

## (undated) DEVICE — DRN PENRS SIL 1X18IN LXF